# Patient Record
Sex: MALE | Employment: FULL TIME | URBAN - METROPOLITAN AREA
[De-identification: names, ages, dates, MRNs, and addresses within clinical notes are randomized per-mention and may not be internally consistent; named-entity substitution may affect disease eponyms.]

---

## 2018-10-01 ENCOUNTER — HOSPITAL ENCOUNTER (EMERGENCY)
Age: 48
Discharge: HOME | End: 2018-10-01
Attending: EMERGENCY MEDICINE | Admitting: EMERGENCY MEDICINE
Payer: COMMERCIAL

## 2018-10-01 VITALS
TEMPERATURE: 98.7 F | OXYGEN SATURATION: 95 % | HEART RATE: 114 BPM | DIASTOLIC BLOOD PRESSURE: 97 MMHG | HEIGHT: 70 IN | WEIGHT: 239.2 LBS | BODY MASS INDEX: 34.24 KG/M2 | SYSTOLIC BLOOD PRESSURE: 142 MMHG | RESPIRATION RATE: 18 BRPM

## 2018-10-01 DIAGNOSIS — S01.01XA LACERATION OF SCALP, INITIAL ENCOUNTER: Primary | ICD-10-CM

## 2018-10-01 PROCEDURE — 77030018836 HC SOL IRR NACL ICUM -A

## 2018-10-01 PROCEDURE — 77030008460 HC STPLR SKN PRECIS 3M -A

## 2018-10-01 PROCEDURE — 75810000293 HC SIMP/SUPERF WND  RPR

## 2018-10-01 PROCEDURE — 99282 EMERGENCY DEPT VISIT SF MDM: CPT

## 2018-10-01 NOTE — ED PROVIDER NOTES
EMERGENCY DEPARTMENT HISTORY AND PHYSICAL EXAM 
 
 
Date: 10/1/2018 Patient Name: Trveer Yi History of Presenting Illness Chief Complaint Patient presents with  Laceration  
  slipped on some soap in the shower and hit the back of head. states brief LOC. has laceration to the back of his head. acting appropriately History Provided By: Patient HPI: Trever Yi, 50 y.o. male with no significant PMHx, presents ambulatory to the ED with cc of laceration to posterior scalp secondary to mechanical fall this morning. Pt reports he slipped on soap in the shower, causing him to fall and hitting back of head. Pt mentions brief LOC following incident. He reports receiving tetanus shot within ~ 5 years and denies endorsing any medication to relieve current symptoms. He denies any hx of recurrent falls. Pt denies any exacerbating and alleviating factors. He specifically denies any fevers, chills, nausea, vomiting, chest pain, shortness of breath, headache, rash, diarrhea, sweating or weight loss. There are no other complaints, changes, or physical findings at this time. PCP: None Past History Past Medical History: 
History reviewed. No pertinent past medical history. Past Surgical History: 
Past Surgical History:  
Procedure Laterality Date  HX WISDOM TEETH EXTRACTION Family History: 
History reviewed. No pertinent family history. Social History: 
Social History Substance Use Topics  Smoking status: Never Smoker  Smokeless tobacco: Never Used  Alcohol use Yes Allergies: 
No Known Allergies Review of Systems Review of Systems Constitutional: Negative. Negative for activity change, appetite change, chills, fatigue, fever and unexpected weight change. HENT: Negative. Negative for congestion, hearing loss, rhinorrhea, sneezing and voice change. Eyes: Negative. Negative for pain and visual disturbance. Respiratory: Negative. Negative for apnea, cough, choking, chest tightness and shortness of breath. Cardiovascular: Negative. Negative for chest pain and palpitations. Gastrointestinal: Negative. Negative for abdominal distention, abdominal pain, blood in stool, diarrhea, nausea and vomiting. Genitourinary: Negative. Negative for difficulty urinating, flank pain, frequency and urgency. No discharge Musculoskeletal: Negative. Negative for arthralgias, back pain, myalgias and neck stiffness. Skin: Negative. Negative for color change and rash. +laceration Neurological: Negative for dizziness, seizures, speech difficulty, weakness, numbness and headaches. Hematological: Negative for adenopathy. Psychiatric/Behavioral: Negative. Negative for agitation, behavioral problems, dysphoric mood and suicidal ideas. The patient is not nervous/anxious. Physical Exam  
Physical Exam  
Constitutional: He is oriented to person, place, and time. He appears well-developed and well-nourished. No distress. HENT:  
Head: Normocephalic. Head is with laceration. Mouth/Throat: Oropharynx is clear and moist. No oropharyngeal exudate. 3 cm superficial flap to posterior occipital   
Eyes: Conjunctivae and EOM are normal. Pupils are equal, round, and reactive to light. Right eye exhibits no discharge. Left eye exhibits no discharge. Neck: Normal range of motion. Neck supple. Cardiovascular: Normal rate, regular rhythm and intact distal pulses. Exam reveals no gallop and no friction rub. No murmur heard. Pulmonary/Chest: Effort normal and breath sounds normal. No respiratory distress. He has no wheezes. He has no rales. He exhibits no tenderness. Abdominal: Soft. Bowel sounds are normal. He exhibits no distension and no mass. There is no tenderness. There is no rebound and no guarding. Musculoskeletal: Normal range of motion. He exhibits no edema. Lymphadenopathy: He has no cervical adenopathy. Neurological: He is alert and oriented to person, place, and time. No cranial nerve deficit. Coordination normal.  
Skin: Skin is warm and dry. Laceration noted. No rash noted. No erythema. Psychiatric: He has a normal mood and affect. Nursing note and vitals reviewed. Diagnostic Study Results Labs - No results found for this or any previous visit (from the past 12 hour(s)). Radiologic Studies - None Medical Decision Making I am the first provider for this patient. I reviewed the vital signs, available nursing notes, past medical history, past surgical history, family history and social history. Vital Signs-Reviewed the patient's vital signs. Patient Vitals for the past 12 hrs: 
 Temp Pulse Resp BP SpO2  
10/01/18 0928 98.7 °F (37.1 °C) (!) 114 18 (!) 142/97 95 % Pulse Oximetry Analysis - 95% on RA Records Reviewed: Nursing Notes and Old Medical Records Provider Notes (Medical Decision Making): DDx: laceration ED Course:  
Initial assessment performed. The patients presenting problems have been discussed, and they are in agreement with the care plan formulated and outlined with them. I have encouraged them to ask questions as they arise throughout their visit. Procedure Note - Laceration Repair: 
9:40 AM 
Procedure by Maria Antonia Cagle MD. Complexity: minor 3cm curved laceration to posterior occipital scalp  was irrigated copiously with NS under jet lavage, prepped with Betadine and draped in a sterile fashion. The wound was explored with the following results: No foreign bodies found. The wound was repaired with 4 staples. The wound was closed with good hemostasis and approximation. Sterile dressing applied. Estimated blood loss: 0 The procedure took 1-15 minutes, and pt tolerated well. Critical Care Time:  
0 minutes Disposition: 
Discharge Note: 
9:37 AM 
 The pt is ready for discharge. The pt's signs, symptoms, diagnosis, and discharge instructions have been discussed and pt has conveyed their understanding. The pt is to follow up as recommended or return to ER should their symptoms worsen. Plan has been discussed and pt is in agreement. PLAN: 
1. There are no discharge medications for this patient. 2.  
Follow-up Information Follow up With Details Comments Contact Info None   None ([phone] Patient stated that they have no PCP [facility] In 1 week For suture removal [address] [place] [address] [address] [facility] 
[phone] Return to ED if worse Diagnosis Clinical Impression: 1. Laceration of scalp, initial encounter Attestations: This note is prepared by Connie Lyons, acting as Scribe for [employer] Nehemiah Andino, [address] [place] [address] Nehemiah Andino MD: The scribe's documentation has been prepared under my direction and personally reviewed by me in its entirety. I confirm that the note above accurately reflects all work, treatment, procedures, and medical decision making performed by me

## 2018-10-01 NOTE — ED NOTES
Assumed care from triage. Patient was getting in the shower and slipped and fell on the shower and hit the back of his head. Patient did LOC for a second, but does not want an EKG. Patient was more so shocked. Dr. Dumont Rounds at bedside.

## 2018-10-01 NOTE — DISCHARGE INSTRUCTIONS
Cuts Closed With Staples: Care Instructions  Your Care Instructions  A cut can happen anywhere on your body. The doctor used staples to close the cut. Staples easily and quickly close a cut, which helps the cut heal.  Sometimes a cut can injure tendons, blood vessels, or nerves. If the cut went deep and through the skin, the doctor may have put in a layer of stitches below the staples. The deeper layer of stitches brings the deep part of the cut together. These stitches will dissolve and don't need to be removed. The staples in the upper layer are what you see on the cut. You may have a bandage. You will need to have the staples removed, usually in 7 to 14 days. The doctor has checked you carefully, but problems can develop later. If you notice any problems or new symptoms, get medical treatment right away. Follow-up care is a key part of your treatment and safety. Be sure to make and go to all appointments, and call your doctor if you are having problems. It's also a good idea to know your test results and keep a list of the medicines you take. How can you care for yourself at home? · Keep the cut dry for the first 24 to 48 hours. After this, you can shower if your doctor okays it. Pat the cut dry. · Don't soak the cut, such as in a bathtub. Your doctor will tell you when it's safe to get the cut wet. · If your doctor told you how to care for your cut, follow your doctor's instructions. If you did not get instructions, follow this general advice:  ¨ After the first 24 to 48 hours, wash around the cut with clean water 2 times a day. Don't use hydrogen peroxide or alcohol, which can slow healing. ¨ You may cover the cut with a thin layer of petroleum jelly, such as Vaseline, and a nonstick bandage. ¨ Apply more petroleum jelly and replace the bandage as needed. · Avoid any activity that could cause your cut to reopen. · Do not remove the staples on your own.  Your doctor will tell you when to come back to have the staples removed. · Take pain medicines exactly as directed. ¨ If the doctor gave you a prescription medicine for pain, take it as prescribed. ¨ If you are not taking a prescription pain medicine, ask your doctor if you can take an over-the-counter medicine. When should you call for help? Call your doctor now or seek immediate medical care if:    · You have new pain, or your pain gets worse.     · The skin near the cut is cold or pale or changes color.     · You have tingling, weakness, or numbness near the cut.     · The cut starts to bleed, and blood soaks through the bandage. Oozing small amounts of blood is normal.     · You have trouble moving the area near the cut.     · You have symptoms of infection, such as:  ¨ Increased pain, swelling, warmth, or redness around the cut. ¨ Red streaks leading from the cut. ¨ Pus draining from the cut. ¨ A fever.    Watch closely for changes in your health, and be sure to contact your doctor if:    · You do not get better as expected. Where can you learn more? Go to http://sayda-justen.info/. Enter Z092 in the search box to learn more about \"Cuts Closed With Staples: Care Instructions. \"  Current as of: November 20, 2017  Content Version: 11.7  © 3781-0575 [employer]. Care instructions adapted under license by [employer] (which disclaims liability or warranty for this information). If you have questions about a medical condition or this instruction, always ask your healthcare professional. [place] [address] any warranty or liability for your use of this information.

## 2018-10-06 ENCOUNTER — APPOINTMENT (OUTPATIENT)
Dept: CT IMAGING | Age: 48
DRG: 637 | End: 2018-10-06
Attending: EMERGENCY MEDICINE
Payer: COMMERCIAL

## 2018-10-06 ENCOUNTER — HOSPITAL ENCOUNTER (INPATIENT)
Age: 48
LOS: 6 days | Discharge: HOME | DRG: 637 | End: 2018-10-12
Attending: EMERGENCY MEDICINE | Admitting: INTERNAL MEDICINE
Payer: COMMERCIAL

## 2018-10-06 ENCOUNTER — APPOINTMENT (OUTPATIENT)
Dept: GENERAL RADIOLOGY | Age: 48
DRG: 637 | End: 2018-10-06
Attending: EMERGENCY MEDICINE
Payer: COMMERCIAL

## 2018-10-06 DIAGNOSIS — N17.9 AKI (ACUTE KIDNEY INJURY) (HCC): ICD-10-CM

## 2018-10-06 DIAGNOSIS — T79.6XXA TRAUMATIC RHABDOMYOLYSIS, INITIAL ENCOUNTER (HCC): ICD-10-CM

## 2018-10-06 DIAGNOSIS — G93.41 ACUTE METABOLIC ENCEPHALOPATHY: ICD-10-CM

## 2018-10-06 DIAGNOSIS — E87.20 METABOLIC ACIDOSIS: ICD-10-CM

## 2018-10-06 DIAGNOSIS — E87.6 ACUTE HYPOKALEMIA: ICD-10-CM

## 2018-10-06 DIAGNOSIS — E08.11 DIABETIC KETOACIDOSIS WITH COMA ASSOCIATED WITH DIABETES MELLITUS DUE TO UNDERLYING CONDITION (HCC): Primary | ICD-10-CM

## 2018-10-06 DIAGNOSIS — E87.0 ACUTE HYPERNATREMIA: ICD-10-CM

## 2018-10-06 DIAGNOSIS — R65.10 SIRS (SYSTEMIC INFLAMMATORY RESPONSE SYNDROME) (HCC): ICD-10-CM

## 2018-10-06 DIAGNOSIS — E87.20 LACTIC ACIDOSIS: ICD-10-CM

## 2018-10-06 PROBLEM — E11.10 DKA (DIABETIC KETOACIDOSES): Status: ACTIVE | Noted: 2018-10-06

## 2018-10-06 LAB
ADMINISTERED INITIALS, ADMINIT: NORMAL
ALBUMIN SERPL-MCNC: 3.4 G/DL (ref 3.5–5)
ALBUMIN/GLOB SERPL: 0.6 {RATIO} (ref 1.1–2.2)
ALP SERPL-CCNC: 178 U/L (ref 45–117)
ALT SERPL-CCNC: 26 U/L (ref 12–78)
AMMONIA PLAS-SCNC: 19 UMOL/L
AMPHET UR QL SCN: NEGATIVE
ANION GAP SERPL CALC-SCNC: 10 MMOL/L (ref 5–15)
ANION GAP SERPL CALC-SCNC: 14 MMOL/L (ref 5–15)
ANION GAP SERPL CALC-SCNC: 22 MMOL/L (ref 5–15)
ANION GAP SERPL CALC-SCNC: 26 MMOL/L (ref 5–15)
ANION GAP SERPL CALC-SCNC: 7 MMOL/L (ref 5–15)
ANION GAP SERPL CALC-SCNC: 8 MMOL/L (ref 5–15)
APPEARANCE UR: CLEAR
ARTERIAL PATENCY WRIST A: YES
AST SERPL-CCNC: 10 U/L (ref 15–37)
ATRIAL RATE: 108 BPM
BACTERIA URNS QL MICRO: NEGATIVE /HPF
BARBITURATES UR QL SCN: NEGATIVE
BASE DEFICIT BLDA-SCNC: 13.2 MMOL/L
BASE DEFICIT BLDV-SCNC: 12.8 MMOL/L
BASOPHILS # BLD: 0 K/UL (ref 0–0.1)
BASOPHILS # BLD: 0 K/UL (ref 0–0.1)
BASOPHILS NFR BLD: 0 % (ref 0–1)
BASOPHILS NFR BLD: 0 % (ref 0–1)
BDY SITE: ABNORMAL
BDY SITE: ABNORMAL
BENZODIAZ UR QL: NEGATIVE
BILIRUB SERPL-MCNC: 0.6 MG/DL (ref 0.2–1)
BILIRUB UR QL: NEGATIVE
BREATHS.SPONTANEOUS ON VENT: 28
BREATHS.SPONTANEOUS ON VENT: 28
BUN SERPL-MCNC: 73 MG/DL (ref 6–20)
BUN SERPL-MCNC: 73 MG/DL (ref 6–20)
BUN SERPL-MCNC: 76 MG/DL (ref 6–20)
BUN SERPL-MCNC: 82 MG/DL (ref 6–20)
BUN/CREAT SERPL: 13 (ref 12–20)
BUN/CREAT SERPL: 13 (ref 12–20)
BUN/CREAT SERPL: 14 (ref 12–20)
BUN/CREAT SERPL: 15 (ref 12–20)
CALCIUM SERPL-MCNC: 6.5 MG/DL (ref 8.5–10.1)
CALCIUM SERPL-MCNC: 8.3 MG/DL (ref 8.5–10.1)
CALCIUM SERPL-MCNC: 8.6 MG/DL (ref 8.5–10.1)
CALCIUM SERPL-MCNC: 8.7 MG/DL (ref 8.5–10.1)
CALCIUM SERPL-MCNC: 8.9 MG/DL (ref 8.5–10.1)
CALCIUM SERPL-MCNC: 9.1 MG/DL (ref 8.5–10.1)
CALCULATED P AXIS, ECG09: 62 DEGREES
CALCULATED R AXIS, ECG10: 20 DEGREES
CALCULATED T AXIS, ECG11: -49 DEGREES
CANNABINOIDS UR QL SCN: NEGATIVE
CHLORIDE SERPL-SCNC: 112 MMOL/L (ref 97–108)
CHLORIDE SERPL-SCNC: 122 MMOL/L (ref 97–108)
CHLORIDE SERPL-SCNC: 127 MMOL/L (ref 97–108)
CHLORIDE SERPL-SCNC: 136 MMOL/L (ref 97–108)
CHLORIDE SERPL-SCNC: 137 MMOL/L (ref 97–108)
CHLORIDE SERPL-SCNC: 97 MMOL/L (ref 97–108)
CK MB CFR SERPL CALC: 0.5 % (ref 0–2.5)
CK MB SERPL-MCNC: 2.1 NG/ML (ref 5–25)
CK SERPL-CCNC: 414 U/L (ref 39–308)
CO2 SERPL-SCNC: 12 MMOL/L (ref 21–32)
CO2 SERPL-SCNC: 14 MMOL/L (ref 21–32)
CO2 SERPL-SCNC: 21 MMOL/L (ref 21–32)
CO2 SERPL-SCNC: 23 MMOL/L (ref 21–32)
CO2 SERPL-SCNC: 24 MMOL/L (ref 21–32)
CO2 SERPL-SCNC: 26 MMOL/L (ref 21–32)
COCAINE UR QL SCN: NEGATIVE
COLOR UR: ABNORMAL
CREAT SERPL-MCNC: 5.21 MG/DL (ref 0.7–1.3)
CREAT SERPL-MCNC: 5.35 MG/DL (ref 0.7–1.3)
CREAT SERPL-MCNC: 5.37 MG/DL (ref 0.7–1.3)
CREAT SERPL-MCNC: 5.44 MG/DL (ref 0.7–1.3)
CREAT SERPL-MCNC: 5.94 MG/DL (ref 0.7–1.3)
CREAT SERPL-MCNC: 6.31 MG/DL (ref 0.7–1.3)
CREAT UR-MCNC: 64.2 MG/DL
CREAT UR-MCNC: 65.3 MG/DL
D50 ADMINISTERED, D50ADM: 0 ML
D50 ORDER, D50ORD: 0 ML
DIAGNOSIS, 93000: NORMAL
DIFFERENTIAL METHOD BLD: ABNORMAL
DIFFERENTIAL METHOD BLD: ABNORMAL
DRUG SCRN COMMENT,DRGCM: NORMAL
EOSINOPHIL # BLD: 0 K/UL (ref 0–0.4)
EOSINOPHIL # BLD: 0 K/UL (ref 0–0.4)
EOSINOPHIL NFR BLD: 0 % (ref 0–7)
EOSINOPHIL NFR BLD: 0 % (ref 0–7)
EPITH CASTS URNS QL MICRO: ABNORMAL /LPF
ERYTHROCYTE [DISTWIDTH] IN BLOOD BY AUTOMATED COUNT: 13.7 % (ref 11.5–14.5)
ERYTHROCYTE [DISTWIDTH] IN BLOOD BY AUTOMATED COUNT: 15.1 % (ref 11.5–14.5)
EST. AVERAGE GLUCOSE BLD GHB EST-MCNC: 329 MG/DL
EST. AVERAGE GLUCOSE BLD GHB EST-MCNC: 335 MG/DL
FLUAV AG NPH QL IA: NEGATIVE
FLUBV AG NOSE QL IA: NEGATIVE
GLOBULIN SER CALC-MCNC: 5.3 G/DL (ref 2–4)
GLSCOM COMMENTS: NORMAL
GLUCOSE BLD STRIP.AUTO-MCNC: 122 MG/DL (ref 65–100)
GLUCOSE BLD STRIP.AUTO-MCNC: 130 MG/DL (ref 65–100)
GLUCOSE BLD STRIP.AUTO-MCNC: 162 MG/DL (ref 65–100)
GLUCOSE BLD STRIP.AUTO-MCNC: 168 MG/DL (ref 65–100)
GLUCOSE BLD STRIP.AUTO-MCNC: 190 MG/DL (ref 65–100)
GLUCOSE BLD STRIP.AUTO-MCNC: 237 MG/DL (ref 65–100)
GLUCOSE BLD STRIP.AUTO-MCNC: 345 MG/DL (ref 65–100)
GLUCOSE BLD STRIP.AUTO-MCNC: 373 MG/DL (ref 65–100)
GLUCOSE BLD STRIP.AUTO-MCNC: 452 MG/DL (ref 65–100)
GLUCOSE BLD STRIP.AUTO-MCNC: 517 MG/DL (ref 65–100)
GLUCOSE BLD STRIP.AUTO-MCNC: >600 MG/DL (ref 65–100)
GLUCOSE SERPL-MCNC: 1133 MG/DL (ref 65–100)
GLUCOSE SERPL-MCNC: 1304 MG/DL (ref 65–100)
GLUCOSE SERPL-MCNC: 1325 MG/DL (ref 65–100)
GLUCOSE SERPL-MCNC: 177 MG/DL (ref 65–100)
GLUCOSE SERPL-MCNC: 398 MG/DL (ref 65–100)
GLUCOSE SERPL-MCNC: 799 MG/DL (ref 65–100)
GLUCOSE SERPL-MCNC: 968 MG/DL (ref 65–100)
GLUCOSE SERPL-MCNC: >1500 MG/DL (ref 65–100)
GLUCOSE SERPL-MCNC: >1500 MG/DL (ref 65–100)
GLUCOSE UR STRIP.AUTO-MCNC: >1000 MG/DL
GLUCOSE, GLC: 122 MG/DL
GLUCOSE, GLC: 130 MG/DL
GLUCOSE, GLC: 162 MG/DL
GLUCOSE, GLC: 168 MG/DL
GLUCOSE, GLC: 190 MG/DL
GLUCOSE, GLC: 237 MG/DL
GLUCOSE, GLC: 345 MG/DL
GLUCOSE, GLC: 373 MG/DL
GLUCOSE, GLC: 452 MG/DL
GLUCOSE, GLC: 517 MG/DL
GLUCOSE, GLC: 601 MG/DL
HBA1C MFR BLD: 13.1 % (ref 4.2–6.3)
HBA1C MFR BLD: 13.3 % (ref 4.2–6.3)
HCO3 BLDA-SCNC: 13 MMOL/L (ref 22–26)
HCO3 BLDV-SCNC: 16 MMOL/L (ref 23–28)
HCT VFR BLD AUTO: 43.7 % (ref 36.6–50.3)
HCT VFR BLD AUTO: 53.3 % (ref 36.6–50.3)
HGB BLD-MCNC: 14.4 G/DL (ref 12.1–17)
HGB BLD-MCNC: 15.6 G/DL (ref 12.1–17)
HGB UR QL STRIP: ABNORMAL
HIGH TARGET, HITG: 250 MG/DL
IMM GRANULOCYTES # BLD: 0 K/UL (ref 0–0.04)
IMM GRANULOCYTES # BLD: 0.3 K/UL (ref 0–0.04)
IMM GRANULOCYTES NFR BLD AUTO: 0 % (ref 0–0.5)
IMM GRANULOCYTES NFR BLD AUTO: 2 % (ref 0–0.5)
INSULIN ADMINSTERED, INSADM: 10.2 UNITS/HOUR
INSULIN ADMINSTERED, INSADM: 10.8 UNITS/HOUR
INSULIN ADMINSTERED, INSADM: 10.8 UNITS/HOUR
INSULIN ADMINSTERED, INSADM: 13 UNITS/HOUR
INSULIN ADMINSTERED, INSADM: 16.2 UNITS/HOUR
INSULIN ADMINSTERED, INSADM: 16.2 UNITS/HOUR
INSULIN ADMINSTERED, INSADM: 17.7 UNITS/HOUR
INSULIN ADMINSTERED, INSADM: 21.6 UNITS/HOUR
INSULIN ADMINSTERED, INSADM: 21.6 UNITS/HOUR
INSULIN ADMINSTERED, INSADM: 27.1 UNITS/HOUR
INSULIN ADMINSTERED, INSADM: 27.1 UNITS/HOUR
INSULIN ADMINSTERED, INSADM: 28.2 UNITS/HOUR
INSULIN ADMINSTERED, INSADM: 28.5 UNITS/HOUR
INSULIN ADMINSTERED, INSADM: 31.4 UNITS/HOUR
INSULIN ADMINSTERED, INSADM: 32 UNITS/HOUR
INSULIN ADMINSTERED, INSADM: 32.5 UNITS/HOUR
INSULIN ADMINSTERED, INSADM: 4 UNITS/HOUR
INSULIN ADMINSTERED, INSADM: 5.6 UNITS/HOUR
INSULIN ORDER, INSORD: 10.2 UNITS/HOUR
INSULIN ORDER, INSORD: 10.8 UNITS/HOUR
INSULIN ORDER, INSORD: 10.8 UNITS/HOUR
INSULIN ORDER, INSORD: 13 UNITS/HOUR
INSULIN ORDER, INSORD: 16.2 UNITS/HOUR
INSULIN ORDER, INSORD: 16.2 UNITS/HOUR
INSULIN ORDER, INSORD: 17.7 UNITS/HOUR
INSULIN ORDER, INSORD: 21.6 UNITS/HOUR
INSULIN ORDER, INSORD: 21.6 UNITS/HOUR
INSULIN ORDER, INSORD: 27.1 UNITS/HOUR
INSULIN ORDER, INSORD: 27.1 UNITS/HOUR
INSULIN ORDER, INSORD: 28.2 UNITS/HOUR
INSULIN ORDER, INSORD: 28.5 UNITS/HOUR
INSULIN ORDER, INSORD: 31.4 UNITS/HOUR
INSULIN ORDER, INSORD: 32 UNITS/HOUR
INSULIN ORDER, INSORD: 32.5 UNITS/HOUR
INSULIN ORDER, INSORD: 4 UNITS/HOUR
INSULIN ORDER, INSORD: 5.6 UNITS/HOUR
KETONES UR QL STRIP.AUTO: 15 MG/DL
LACTATE SERPL-SCNC: 2.2 MMOL/L (ref 0.4–2)
LACTATE SERPL-SCNC: 3.5 MMOL/L (ref 0.4–2)
LACTATE SERPL-SCNC: 3.8 MMOL/L (ref 0.4–2)
LEUKOCYTE ESTERASE UR QL STRIP.AUTO: NEGATIVE
LIPASE SERPL-CCNC: 889 U/L (ref 73–393)
LOW TARGET, LOT: 150 MG/DL
LYMPHOCYTES # BLD: 1.2 K/UL (ref 0.8–3.5)
LYMPHOCYTES # BLD: 1.3 K/UL (ref 0.8–3.5)
LYMPHOCYTES NFR BLD: 6 % (ref 12–49)
LYMPHOCYTES NFR BLD: 7 % (ref 12–49)
MAGNESIUM SERPL-MCNC: 3.3 MG/DL (ref 1.6–2.4)
MAGNESIUM SERPL-MCNC: 4 MG/DL (ref 1.6–2.4)
MAGNESIUM SERPL-MCNC: 4.1 MG/DL (ref 1.6–2.4)
MAGNESIUM SERPL-MCNC: 4.2 MG/DL (ref 1.6–2.4)
MAGNESIUM SERPL-MCNC: 4.3 MG/DL (ref 1.6–2.4)
MCH RBC QN AUTO: 27.6 PG (ref 26–34)
MCH RBC QN AUTO: 28 PG (ref 26–34)
MCHC RBC AUTO-ENTMCNC: 29.3 G/DL (ref 30–36.5)
MCHC RBC AUTO-ENTMCNC: 33 G/DL (ref 30–36.5)
MCV RBC AUTO: 83.9 FL (ref 80–99)
MCV RBC AUTO: 95.5 FL (ref 80–99)
METHADONE UR QL: NEGATIVE
MICROALBUMIN UR-MCNC: 7.74 MG/DL
MICROALBUMIN/CREAT UR-RTO: 119 MG/G (ref 0–30)
MINUTES UNTIL NEXT BG, NBG: 60 MIN
MONOCYTES # BLD: 0.5 K/UL (ref 0–1)
MONOCYTES # BLD: 1.2 K/UL (ref 0–1)
MONOCYTES NFR BLD: 3 % (ref 5–13)
MONOCYTES NFR BLD: 6 % (ref 5–13)
MULTIPLIER, MUL: 0.02
MULTIPLIER, MUL: 0.03
MULTIPLIER, MUL: 0.03
MULTIPLIER, MUL: 0.04
MULTIPLIER, MUL: 0.04
MULTIPLIER, MUL: 0.05
MULTIPLIER, MUL: 0.05
MULTIPLIER, MUL: 0.06
MULTIPLIER, MUL: 0.06
MULTIPLIER, MUL: 0.07
MULTIPLIER, MUL: 0.08
MULTIPLIER, MUL: 0.08
MULTIPLIER, MUL: 0.09
MULTIPLIER, MUL: 0.1
NEUTS BAND NFR BLD MANUAL: 3 %
NEUTS SEG # BLD: 16.7 K/UL (ref 1.8–8)
NEUTS SEG # BLD: 18 K/UL (ref 1.8–8)
NEUTS SEG NFR BLD: 85 % (ref 32–75)
NEUTS SEG NFR BLD: 89 % (ref 32–75)
NITRITE UR QL STRIP.AUTO: NEGATIVE
NRBC # BLD: 0 K/UL (ref 0–0.01)
NRBC # BLD: 0 K/UL (ref 0–0.01)
NRBC BLD-RTO: 0 PER 100 WBC
NRBC BLD-RTO: 0 PER 100 WBC
OPIATES UR QL: NEGATIVE
ORDER INITIALS, ORDINIT: NORMAL
P-R INTERVAL, ECG05: 132 MS
PCO2 BLDA: 30 MMHG (ref 35–45)
PCO2 BLDV: 48 MMHG (ref 41–51)
PCP UR QL: NEGATIVE
PH BLDA: 7.25 [PH] (ref 7.35–7.45)
PH BLDV: 7.14 [PH] (ref 7.32–7.42)
PH UR STRIP: 5 [PH] (ref 5–8)
PHOSPHATE SERPL-MCNC: 1.2 MG/DL (ref 2.6–4.7)
PHOSPHATE SERPL-MCNC: 4.6 MG/DL (ref 2.6–4.7)
PLATELET # BLD AUTO: 221 K/UL (ref 150–400)
PLATELET # BLD AUTO: 264 K/UL (ref 150–400)
PMV BLD AUTO: 12 FL (ref 8.9–12.9)
PMV BLD AUTO: 12.8 FL (ref 8.9–12.9)
PO2 BLDA: 83 MMHG (ref 80–100)
PO2 BLDV: 33 MMHG (ref 25–40)
POTASSIUM SERPL-SCNC: 3 MMOL/L (ref 3.5–5.1)
POTASSIUM SERPL-SCNC: 3.1 MMOL/L (ref 3.5–5.1)
POTASSIUM SERPL-SCNC: 3.6 MMOL/L (ref 3.5–5.1)
POTASSIUM SERPL-SCNC: 3.6 MMOL/L (ref 3.5–5.1)
POTASSIUM SERPL-SCNC: 3.8 MMOL/L (ref 3.5–5.1)
POTASSIUM SERPL-SCNC: 4.6 MMOL/L (ref 3.5–5.1)
PROT SERPL-MCNC: 8.7 G/DL (ref 6.4–8.2)
PROT UR STRIP-MCNC: 30 MG/DL
Q-T INTERVAL, ECG07: 348 MS
QRS DURATION, ECG06: 90 MS
QTC CALCULATION (BEZET), ECG08: 466 MS
RBC # BLD AUTO: 5.21 M/UL (ref 4.1–5.7)
RBC # BLD AUTO: 5.58 M/UL (ref 4.1–5.7)
RBC #/AREA URNS HPF: ABNORMAL /HPF (ref 0–5)
RBC MORPH BLD: ABNORMAL
SAO2 % BLD: 95 % (ref 92–97)
SAO2 % BLDV: 46 % (ref 65–88)
SAO2% DEVICE SAO2% SENSOR NAME: ABNORMAL
SAO2% DEVICE SAO2% SENSOR NAME: ABNORMAL
SERVICE CMNT-IMP: ABNORMAL
SODIUM SERPL-SCNC: 137 MMOL/L (ref 136–145)
SODIUM SERPL-SCNC: 146 MMOL/L (ref 136–145)
SODIUM SERPL-SCNC: 157 MMOL/L (ref 136–145)
SODIUM SERPL-SCNC: 161 MMOL/L (ref 136–145)
SODIUM SERPL-SCNC: 167 MMOL/L (ref 136–145)
SODIUM SERPL-SCNC: 170 MMOL/L (ref 136–145)
SODIUM UR-SCNC: 13 MMOL/L
SP GR UR REFRACTOMETRY: 1.03 (ref 1–1.03)
SPECIMEN SITE: ABNORMAL
SPECIMEN SITE: ABNORMAL
TRIGL SERPL-MCNC: 298 MG/DL (ref ?–150)
TROPONIN I SERPL-MCNC: 0.06 NG/ML
UA: UC IF INDICATED,UAUC: ABNORMAL
UROBILINOGEN UR QL STRIP.AUTO: 0.2 EU/DL (ref 0.2–1)
VENTRICULAR RATE, ECG03: 108 BPM
WBC # BLD AUTO: 18.9 K/UL (ref 4.1–11.1)
WBC # BLD AUTO: 20.4 K/UL (ref 4.1–11.1)
WBC URNS QL MICRO: ABNORMAL /HPF (ref 0–4)

## 2018-10-06 PROCEDURE — 83605 ASSAY OF LACTIC ACID: CPT | Performed by: GENERAL ACUTE CARE HOSPITAL

## 2018-10-06 PROCEDURE — 74011000250 HC RX REV CODE- 250: Performed by: INTERNAL MEDICINE

## 2018-10-06 PROCEDURE — 74011250636 HC RX REV CODE- 250/636: Performed by: INTERNAL MEDICINE

## 2018-10-06 PROCEDURE — 80307 DRUG TEST PRSMV CHEM ANLYZR: CPT | Performed by: EMERGENCY MEDICINE

## 2018-10-06 PROCEDURE — 96376 TX/PRO/DX INJ SAME DRUG ADON: CPT

## 2018-10-06 PROCEDURE — 82962 GLUCOSE BLOOD TEST: CPT

## 2018-10-06 PROCEDURE — 82570 ASSAY OF URINE CREATININE: CPT | Performed by: INTERNAL MEDICINE

## 2018-10-06 PROCEDURE — 96375 TX/PRO/DX INJ NEW DRUG ADDON: CPT

## 2018-10-06 PROCEDURE — 93005 ELECTROCARDIOGRAM TRACING: CPT

## 2018-10-06 PROCEDURE — 65610000006 HC RM INTENSIVE CARE

## 2018-10-06 PROCEDURE — 81001 URINALYSIS AUTO W/SCOPE: CPT | Performed by: EMERGENCY MEDICINE

## 2018-10-06 PROCEDURE — 74011636637 HC RX REV CODE- 636/637: Performed by: INTERNAL MEDICINE

## 2018-10-06 PROCEDURE — 96361 HYDRATE IV INFUSION ADD-ON: CPT

## 2018-10-06 PROCEDURE — 83036 HEMOGLOBIN GLYCOSYLATED A1C: CPT | Performed by: EMERGENCY MEDICINE

## 2018-10-06 PROCEDURE — 74011636637 HC RX REV CODE- 636/637: Performed by: EMERGENCY MEDICINE

## 2018-10-06 PROCEDURE — 96365 THER/PROPH/DIAG IV INF INIT: CPT

## 2018-10-06 PROCEDURE — C1751 CATH, INF, PER/CENT/MIDLINE: HCPCS

## 2018-10-06 PROCEDURE — 36600 WITHDRAWAL OF ARTERIAL BLOOD: CPT | Performed by: INTERNAL MEDICINE

## 2018-10-06 PROCEDURE — 83690 ASSAY OF LIPASE: CPT | Performed by: INTERNAL MEDICINE

## 2018-10-06 PROCEDURE — 74011000250 HC RX REV CODE- 250: Performed by: EMERGENCY MEDICINE

## 2018-10-06 PROCEDURE — 87804 INFLUENZA ASSAY W/OPTIC: CPT | Performed by: EMERGENCY MEDICINE

## 2018-10-06 PROCEDURE — 83036 HEMOGLOBIN GLYCOSYLATED A1C: CPT | Performed by: INTERNAL MEDICINE

## 2018-10-06 PROCEDURE — 36415 COLL VENOUS BLD VENIPUNCTURE: CPT | Performed by: INTERNAL MEDICINE

## 2018-10-06 PROCEDURE — 75810000137 HC PLCMT CENT VENOUS CATH

## 2018-10-06 PROCEDURE — 74011250637 HC RX REV CODE- 250/637: Performed by: GENERAL ACUTE CARE HOSPITAL

## 2018-10-06 PROCEDURE — 84100 ASSAY OF PHOSPHORUS: CPT | Performed by: EMERGENCY MEDICINE

## 2018-10-06 PROCEDURE — 84100 ASSAY OF PHOSPHORUS: CPT | Performed by: INTERNAL MEDICINE

## 2018-10-06 PROCEDURE — 71045 X-RAY EXAM CHEST 1 VIEW: CPT

## 2018-10-06 PROCEDURE — 80048 BASIC METABOLIC PNL TOTAL CA: CPT | Performed by: INTERNAL MEDICINE

## 2018-10-06 PROCEDURE — 70450 CT HEAD/BRAIN W/O DYE: CPT

## 2018-10-06 PROCEDURE — 74011250636 HC RX REV CODE- 250/636: Performed by: EMERGENCY MEDICINE

## 2018-10-06 PROCEDURE — 74011000258 HC RX REV CODE- 258: Performed by: INTERNAL MEDICINE

## 2018-10-06 PROCEDURE — 85025 COMPLETE CBC W/AUTO DIFF WBC: CPT | Performed by: EMERGENCY MEDICINE

## 2018-10-06 PROCEDURE — 80053 COMPREHEN METABOLIC PANEL: CPT | Performed by: EMERGENCY MEDICINE

## 2018-10-06 PROCEDURE — 74011250636 HC RX REV CODE- 250/636: Performed by: GENERAL ACUTE CARE HOSPITAL

## 2018-10-06 PROCEDURE — 82550 ASSAY OF CK (CPK): CPT | Performed by: EMERGENCY MEDICINE

## 2018-10-06 PROCEDURE — 82803 BLOOD GASES ANY COMBINATION: CPT | Performed by: EMERGENCY MEDICINE

## 2018-10-06 PROCEDURE — 84478 ASSAY OF TRIGLYCERIDES: CPT | Performed by: INTERNAL MEDICINE

## 2018-10-06 PROCEDURE — 74011000258 HC RX REV CODE- 258: Performed by: EMERGENCY MEDICINE

## 2018-10-06 PROCEDURE — 06HY33Z INSERTION OF INFUSION DEVICE INTO LOWER VEIN, PERCUTANEOUS APPROACH: ICD-10-PCS | Performed by: EMERGENCY MEDICINE

## 2018-10-06 PROCEDURE — 83735 ASSAY OF MAGNESIUM: CPT | Performed by: INTERNAL MEDICINE

## 2018-10-06 PROCEDURE — 99285 EMERGENCY DEPT VISIT HI MDM: CPT

## 2018-10-06 PROCEDURE — 82140 ASSAY OF AMMONIA: CPT | Performed by: EMERGENCY MEDICINE

## 2018-10-06 PROCEDURE — 87040 BLOOD CULTURE FOR BACTERIA: CPT | Performed by: EMERGENCY MEDICINE

## 2018-10-06 PROCEDURE — 77030005518 HC CATH URETH FOL 2W BARD -B

## 2018-10-06 PROCEDURE — 84484 ASSAY OF TROPONIN QUANT: CPT | Performed by: EMERGENCY MEDICINE

## 2018-10-06 PROCEDURE — 82803 BLOOD GASES ANY COMBINATION: CPT | Performed by: INTERNAL MEDICINE

## 2018-10-06 PROCEDURE — 83605 ASSAY OF LACTIC ACID: CPT | Performed by: EMERGENCY MEDICINE

## 2018-10-06 PROCEDURE — 84300 ASSAY OF URINE SODIUM: CPT | Performed by: INTERNAL MEDICINE

## 2018-10-06 PROCEDURE — 82947 ASSAY GLUCOSE BLOOD QUANT: CPT

## 2018-10-06 PROCEDURE — 82947 ASSAY GLUCOSE BLOOD QUANT: CPT | Performed by: INTERNAL MEDICINE

## 2018-10-06 PROCEDURE — 83735 ASSAY OF MAGNESIUM: CPT | Performed by: EMERGENCY MEDICINE

## 2018-10-06 RX ORDER — DEXTROSE 50 % IN WATER (D50W) INTRAVENOUS SYRINGE
25-50 AS NEEDED
Status: DISCONTINUED | OUTPATIENT
Start: 2018-10-06 | End: 2018-10-09

## 2018-10-06 RX ORDER — VANCOMYCIN 2 GRAM/500 ML IN 0.9 % SODIUM CHLORIDE INTRAVENOUS
2
Status: DISCONTINUED | OUTPATIENT
Start: 2018-10-06 | End: 2018-10-07

## 2018-10-06 RX ORDER — SODIUM CHLORIDE 0.9 % (FLUSH) 0.9 %
5-10 SYRINGE (ML) INJECTION AS NEEDED
Status: DISCONTINUED | OUTPATIENT
Start: 2018-10-06 | End: 2018-10-09 | Stop reason: SDUPTHER

## 2018-10-06 RX ORDER — MAGNESIUM SULFATE 100 %
4 CRYSTALS MISCELLANEOUS AS NEEDED
Status: DISCONTINUED | OUTPATIENT
Start: 2018-10-06 | End: 2018-10-06

## 2018-10-06 RX ORDER — MAGNESIUM SULFATE 100 %
4 CRYSTALS MISCELLANEOUS AS NEEDED
Status: DISCONTINUED | OUTPATIENT
Start: 2018-10-06 | End: 2018-10-09

## 2018-10-06 RX ORDER — MUPIROCIN 20 MG/G
OINTMENT TOPICAL 2 TIMES DAILY
Status: COMPLETED | OUTPATIENT
Start: 2018-10-06 | End: 2018-10-10

## 2018-10-06 RX ORDER — DEXTROSE 50 % IN WATER (D50W) INTRAVENOUS SYRINGE
25-50 AS NEEDED
Status: DISCONTINUED | OUTPATIENT
Start: 2018-10-06 | End: 2018-10-06

## 2018-10-06 RX ORDER — POTASSIUM CHLORIDE AND SODIUM CHLORIDE 900; 300 MG/100ML; MG/100ML
INJECTION, SOLUTION INTRAVENOUS CONTINUOUS
Status: DISCONTINUED | OUTPATIENT
Start: 2018-10-06 | End: 2018-10-06

## 2018-10-06 RX ORDER — SODIUM CHLORIDE 0.9 % (FLUSH) 0.9 %
5-10 SYRINGE (ML) INJECTION EVERY 8 HOURS
Status: DISCONTINUED | OUTPATIENT
Start: 2018-10-06 | End: 2018-10-12 | Stop reason: HOSPADM

## 2018-10-06 RX ORDER — HEPARIN SODIUM 5000 [USP'U]/ML
5000 INJECTION, SOLUTION INTRAVENOUS; SUBCUTANEOUS EVERY 12 HOURS
Status: DISCONTINUED | OUTPATIENT
Start: 2018-10-06 | End: 2018-10-06

## 2018-10-06 RX ORDER — FAMOTIDINE 10 MG/ML
20 INJECTION INTRAVENOUS EVERY 12 HOURS
Status: DISCONTINUED | OUTPATIENT
Start: 2018-10-06 | End: 2018-10-06

## 2018-10-06 RX ORDER — INSULIN LISPRO 100 [IU]/ML
INJECTION, SOLUTION INTRAVENOUS; SUBCUTANEOUS
Status: DISCONTINUED | OUTPATIENT
Start: 2018-10-06 | End: 2018-10-06

## 2018-10-06 RX ORDER — POTASSIUM CHLORIDE AND SODIUM CHLORIDE 450; 150 MG/100ML; MG/100ML
INJECTION, SOLUTION INTRAVENOUS CONTINUOUS
Status: DISCONTINUED | OUTPATIENT
Start: 2018-10-06 | End: 2018-10-07

## 2018-10-06 RX ORDER — SODIUM CHLORIDE 0.9 % (FLUSH) 0.9 %
5-10 SYRINGE (ML) INJECTION AS NEEDED
Status: DISCONTINUED | OUTPATIENT
Start: 2018-10-06 | End: 2018-10-12 | Stop reason: HOSPADM

## 2018-10-06 RX ORDER — HEPARIN SODIUM 5000 [USP'U]/ML
5000 INJECTION, SOLUTION INTRAVENOUS; SUBCUTANEOUS EVERY 8 HOURS
Status: DISCONTINUED | OUTPATIENT
Start: 2018-10-06 | End: 2018-10-09

## 2018-10-06 RX ORDER — POTASSIUM CHLORIDE 7.45 MG/ML
10 INJECTION INTRAVENOUS
Status: COMPLETED | OUTPATIENT
Start: 2018-10-06 | End: 2018-10-07

## 2018-10-06 RX ORDER — DEXTROSE, SODIUM CHLORIDE, AND POTASSIUM CHLORIDE 5; .45; .15 G/100ML; G/100ML; G/100ML
175 INJECTION INTRAVENOUS CONTINUOUS
Status: DISCONTINUED | OUTPATIENT
Start: 2018-10-06 | End: 2018-10-07

## 2018-10-06 RX ORDER — INSULIN LISPRO 100 [IU]/ML
INJECTION, SOLUTION INTRAVENOUS; SUBCUTANEOUS
Status: DISCONTINUED | OUTPATIENT
Start: 2018-10-06 | End: 2018-10-09

## 2018-10-06 RX ORDER — SODIUM BICARBONATE 1 MEQ/ML
50 SYRINGE (ML) INTRAVENOUS
Status: COMPLETED | OUTPATIENT
Start: 2018-10-06 | End: 2018-10-06

## 2018-10-06 RX ADMIN — POTASSIUM CHLORIDE 10 MEQ: 10 INJECTION, SOLUTION INTRAVENOUS at 14:16

## 2018-10-06 RX ADMIN — POTASSIUM CHLORIDE 10 MEQ: 10 INJECTION, SOLUTION INTRAVENOUS at 16:27

## 2018-10-06 RX ADMIN — SODIUM BICARBONATE 50 MEQ: 84 INJECTION INTRAVENOUS at 04:32

## 2018-10-06 RX ADMIN — PIPERACILLIN SODIUM,TAZOBACTAM SODIUM 3.38 G: 3; .375 INJECTION, POWDER, FOR SOLUTION INTRAVENOUS at 04:35

## 2018-10-06 RX ADMIN — INSULIN HUMAN 10 UNITS: 100 INJECTION, SOLUTION PARENTERAL at 04:44

## 2018-10-06 RX ADMIN — SODIUM CHLORIDE 1000 ML: 900 INJECTION, SOLUTION INTRAVENOUS at 01:59

## 2018-10-06 RX ADMIN — SODIUM CHLORIDE AND POTASSIUM CHLORIDE: 9; 2.98 INJECTION, SOLUTION INTRAVENOUS at 09:20

## 2018-10-06 RX ADMIN — Medication 10 ML: at 13:17

## 2018-10-06 RX ADMIN — SODIUM CHLORIDE AND POTASSIUM CHLORIDE: 4.5; 1.49 INJECTION, SOLUTION INTRAVENOUS at 11:36

## 2018-10-06 RX ADMIN — HEPARIN SODIUM 5000 UNITS: 5000 INJECTION INTRAVENOUS; SUBCUTANEOUS at 23:19

## 2018-10-06 RX ADMIN — SODIUM CHLORIDE 1000 ML: 900 INJECTION, SOLUTION INTRAVENOUS at 06:00

## 2018-10-06 RX ADMIN — MUPIROCIN: 20 OINTMENT TOPICAL at 08:38

## 2018-10-06 RX ADMIN — SODIUM CHLORIDE 27.1 UNITS/HR: 900 INJECTION, SOLUTION INTRAVENOUS at 09:22

## 2018-10-06 RX ADMIN — POTASSIUM CHLORIDE 10 MEQ: 10 INJECTION, SOLUTION INTRAVENOUS at 15:17

## 2018-10-06 RX ADMIN — SODIUM CHLORIDE AND POTASSIUM CHLORIDE: 4.5; 1.49 INJECTION, SOLUTION INTRAVENOUS at 18:03

## 2018-10-06 RX ADMIN — MUPIROCIN: 20 OINTMENT TOPICAL at 18:00

## 2018-10-06 RX ADMIN — FAMOTIDINE 20 MG: 10 INJECTION, SOLUTION INTRAVENOUS at 08:38

## 2018-10-06 RX ADMIN — SODIUM CHLORIDE 32 UNITS/HR: 900 INJECTION, SOLUTION INTRAVENOUS at 14:08

## 2018-10-06 RX ADMIN — POTASSIUM CHLORIDE 10 MEQ: 10 INJECTION, SOLUTION INTRAVENOUS at 13:17

## 2018-10-06 RX ADMIN — SODIUM CHLORIDE 28.5 UNITS/HR: 900 INJECTION, SOLUTION INTRAVENOUS at 17:37

## 2018-10-06 RX ADMIN — HEPARIN SODIUM 5000 UNITS: 5000 INJECTION INTRAVENOUS; SUBCUTANEOUS at 08:38

## 2018-10-06 RX ADMIN — VANCOMYCIN HYDROCHLORIDE 2000 MG: 10 INJECTION, POWDER, LYOPHILIZED, FOR SOLUTION INTRAVENOUS at 06:24

## 2018-10-06 RX ADMIN — DEXTROSE MONOHYDRATE, SODIUM CHLORIDE, AND POTASSIUM CHLORIDE 175 ML/HR: 50; 4.5; 1.49 INJECTION, SOLUTION INTRAVENOUS at 19:23

## 2018-10-06 RX ADMIN — SODIUM CHLORIDE 10.8 UNITS/HR: 900 INJECTION, SOLUTION INTRAVENOUS at 05:04

## 2018-10-06 RX ADMIN — HEPARIN SODIUM 5000 UNITS: 5000 INJECTION INTRAVENOUS; SUBCUTANEOUS at 16:31

## 2018-10-06 NOTE — PROGRESS NOTES
10/6/2018 INTENSIVIST PROGRESS NOTE:  
 
Patient seen and evaluated, chart reviewed. Patient admitted this morning with AMS. Found to have DKA. He is lethargic and can provide no history at this time. No known PMH, including no known DM. Visit Vitals  /55  Pulse (!) 108  Temp 97.7 °F (36.5 °C)  Resp 26  
 Ht 6' 2\" (1.88 m)  Wt 117.9 kg (260 lb)  SpO2 97%  BMI 33.38 kg/m2 General:  No acute distress, not interactive, eyes open, however Eyes: anicteric HEENT: NC, scalp staples in place CV:  Tachy, regular Lungs: CTA B, no wheeze Abd: soft, +BS, no focal tenderness Ext: no cyanosis, no clubbing Skin: warm and dry Musculoskeletal: no gross deformities Neuro: not interactive, looking around room, no focal findings CXR: no acute process Head CT negative Labs reviewed A/P: 
- DKA/HHS - insulin, IVF, replete electrolytes per protocol - pancreatitis by enzymes - NPO for now, check triglycerides 
- acute renal failure - IV fluids 
- encephalopathy - metabolic, monitor 
- DVT prophylaxis Johana Cervantes MD

## 2018-10-06 NOTE — CONSULTS
Consultation Note NAME: Cleve Weaver  
:  1970 MRN:  [id number] Date/Time:  10/6/2018 10:10 AM 
 
I have been asked to see this patient by Dr. Ron Webb  for advice/opinion re: PETER. Assessment :    Plan: 
PETER Mild rhabdo DKA Hypokalemia Severe hypernatremia AMS Unclear what has caused PETER. It has improved since admission with IVF so at least some element of volume. Sodium corrects to 173. Will change to hypotonic fluids. Check FENA and quantitate proteinuria. I've ordered a Maldonado as we need strict I's and O's. U/A with large blood and no RBC's c/w rhabdo. CK mildly elevated. Will check again in AM. ADDEDNDUM: called re: labs. Sodium 161, glucose 799. Corrected sodium 172. Continue with current fluids for now. 6:15pm Called by nurse regarding sodium. It is 167. Glucose down to 389. Corrected sodium is now 171. Very slowly better. I'll increase IVF. Creatinine continues to improve. Addendum 2200 - sodium continues to slowly improve. Labs at 4am.  I did not say that I did not want want to be notified of AM labs if there was something of concern. Subjective: CHIEF COMPLAINT:  AMS HISTORY OF PRESENT ILLNESS:    
Airam Aguilar is a 50 y.o.   male admitted with DKA. Pt can give no history so history is obtained from review of his chart. Review shows that he was here earlier this week after he fell in the shower and his his head. No labs were drawn. The patient's roommate apparently called EMS as the patient had worsening AMS. In the ER he was evaluated and found to have a markedly elevated glucose and creatinine. He has been started on an insulin drip and his glucose is improving. No past medical history on file. Past Surgical History:  
Procedure Laterality Date  HX WISDOM TEETH EXTRACTION Social History Substance Use Topics  Smoking status: Never Smoker  Smokeless tobacco: Never Used  Alcohol use Yes No family history on file. No Known Allergies Prior to Admission medications Not on File REVIEW OF SYSTEMS:   
 [x]  Unable to obtain reliable ROS due to  [x] mental status  [] sedated   [] intubated 
 [] Total of 12 systems reviewed as follows: 
Constitutional: negative fever, negative chills, negative weight loss Eyes:   negative visual changes ENT:   negative sore throat, tongue or lip swelling Respiratory:  negative cough, negative dyspnea Cards:  negative for chest pain, palpitations, lower extremity edema GI:   negative for nausea, vomiting, diarrhea, and abdominal pain :  negative for frequency, dysuria Integument:  negative for rash and pruritus Heme:  negative for easy bruising and gum/nose bleeding Musculoskel: negative for myalgias,  back pain and muscle weakness Neuro:  negative for headaches, dizziness, vertigo Psych:  negative for feelings of anxiety, depression Travel?: none Objective: VITALS:   
Visit Vitals  BP 92/62  Pulse (!) 110  Temp 98.5 °F (36.9 °C)  Resp 22  
 Ht 6' 2\" (1.88 m)  Wt 117.9 kg (260 lb)  SpO2 96%  BMI 33.38 kg/m2 PHYSICAL EXAM: 
Gen:  []  WD []  WN  [] cachectic []  thin []  obese []  disheveled [x]  ill apearing  []   Critical  []   Chronic    []  No acute distress HEENT:   [x] NC/AT [x] pink conjunctivae      [] pale conjunctivae PERRL  [] yes  [] no      [] moist mucosa    [] dry mucosa 
  hearing intact to voice [] yes  [] No 
              
NECK:   supple [x] yes  [] no        masses [] yes  [x] No 
             thyroid  []  non tender  []  tender RESP:   [x] CTA bilaterally/no wheezing/rhonchi/rales/crackles [] rhonchi bilaterally - no dullness  [] wheezing   [] rhonchi   [] crackles  
  use of accessory muscles [] yes [] no CARD:   [x]  regular rate and rhythm/No murmurs/rubs/gallops 
  murmur  [] yes ()  [] no      Rubs  [] yes  [] no       Gallops [] yes  [] no 
 Rate []  regular  []  irregular        carotid bruits  [] Right  []  Left LE edema [] yes  [x] no           JVP  []  yes   []  no 
 
ABD:    [x] soft/non distended/non tender/+bowel sounds/no HSM []  Rigid    tenderness [] yes [] no   Liver enlargement  []  yes []  no  
             Spleen enlargement  []  yes []  no     distended []  yes [] no  
  bowel sound  [] hypoactive   [] hyperactive LYMPH:    Neck []  yes [x]  no       Axillae []  yes [x]  no SKIN:   Rashes []  yes   [x]  no    Ulcers []  yes   [x]  no 
             [] tight to palpitation    skin turgor []  good  [] poor  [] decreased Cyanosis/clubbing []  yes []  no 
 
NEUR:   [] cranial nerves II-XII grossly intact    
  [] Cranial nerves deficit 
               []  facial droop    []  slurred speech   [] aphasic  
  [] Strength normal     []  weakness  []  LUE  []   RUE/ []  LLE  []   RLE 
  follows commands  []  yes [x]  no        
 
PSYCH:   insight [x] poor [] good   Alert and Oriented to  [] person  [] place  []  time  
                 [] depressed [] anxious [] agitated  [] lethargic [] stuporous  [] sedated LAB DATA REVIEWED:   
Recent Labs 10/06/18 
 1404  10/06/18 
 8783 WBC  18.9*  20.4* HGB  14.4  15.6 HCT  43.7  53.3*  
PLT  221  264 Recent Labs 10/06/18 
 3042  10/06/18 
 0730  10/06/18 
 4820  10/06/18 
 1183  10/06/18 
 7298 NA  157*   --    --   146*  137  
K  3.0*   --    --   3.8  4.6 CL  122*   --    --   112*  97  
CO2  21   --    --   12*  14* BUN  76*   --    --   76*  82* CREA  5.94*   --    --   5.44*  6.31* GLU  1133*  1325*  1304*  >1500*  >1500* CA  8.3*   --    --   6.5*  9.1 MG  4.2*   --    --   3.3*  4.3*  
PHOS   --    --    --   4.6   --   
 
Recent Labs 10/06/18 
 4508  10/06/18 
 5737 SGOT   --   10* ALT   --   26  
AP   --   178* TBILI   --   0.6 ALB   --   3.4*  
GLOB   --   5.3*  
LPSE  889*   --   
 
 No results for input(s): INR, PTP, APTT in the last 72 hours. No lab exists for component: INREXT No results for input(s): FE, TIBC, PSAT, FERR in the last 72 hours. Recent Labs 10/06/18 
 2199 PH  7.25* PCO2  30* PO2  83 Recent Labs 10/06/18 
 0132 CPK  414* CKMB  2.1 Lab Results Component Value Date/Time Glucose (POC) >600 (HH) 10/06/2018 09:38 AM  
 Glucose (POC) >600 (HH) 10/06/2018 07:19 AM  
 Glucose (POC) >600 (HH) 10/06/2018 06:06 AM  
 Glucose (POC) >600 (HH) 10/06/2018 05:00 AM  
 Glucose (POC) >600 (HH) 10/06/2018 04:59 AM  
 
 
Procedures: see electronic medical records for all procedures/Xrays and details which were not copied into this note but were reviewed prior to creation of Plan.   
________________________________________________________________________ 
  
 
___________________________________________________ Consulting Physician: Stephy Mason MD

## 2018-10-06 NOTE — H&P
Hospitalist Admission Note NAME: Rogers Linder  
:  1970 MRN:  [id number] Date/Time:  10/6/2018 6:35 AM 
 
Patient PCP: None 
______________________________________________________________________ Given the patient's current clinical presentation, I have a high level of concern for decompensation if discharged from the emergency department. Complex decision making was performed, which includes reviewing the patient's available past medical records, laboratory results, and x-ray films. My assessment of this patient's clinical condition and my plan of care is as follows. Assessment / Plan: 
 
 severe DKA Start patient on IV fluid Start patient on bicarb Start patient on insulin drip Admit to the ICU Acute renal failure secondary to dehydration Start patient on IV fluid Follow-up repeat BMP 
-Nephrology consult Leukocytosis most likely reactive Follow blood culture Follow-up her calcitonin Continue IV antibiotic empiric therapy Change mental status likely secondary to metabolic encephalopathy 
-continue insulin drip  
-neuro consult at am   
 
Hypernatremia  
-corrected  
-nephrology consult  
-avoid overcorrection Code Status: full Surrogate Decision Maker: DVT Prophylaxis: heparin GI Prophylaxis: not indicated Baseline: independent Subjective: CHIEF COMPLAINT: change mental status HISTORY OF PRESENT ILLNESS:    
50years old male from home with no significant past medical history presented to the ED for evaluation of change mental status patient had mechanical fall fall in the shower on  and was evaluated in the ED and he had scalp laceration repaired with 4 staples according to the EMS report patient roommate was called EMS today because patient worsening mental status since injury according to the EMS patient baseline alert oriented x3 patient was noticed to be hypotensive on admission blood sugar was noticed to be significantly elevated about 1500.  
   
  
     
   
 
 
 
We were asked to admit for work up and evaluation of the above problems. No past medical history on file. Past Surgical History:  
Procedure Laterality Date  HX WISDOM TEETH EXTRACTION Social History Substance Use Topics  Smoking status: Never Smoker  Smokeless tobacco: Never Used  Alcohol use Yes No family history on file. No Known Allergies Prior to Admission medications Not on File REVIEW OF SYSTEMS:    
I am not able to complete the review of systems because: The patient is intubated and sedated  
y The patient has altered mental status due to his acute medical problems The patient has baseline aphasia from prior stroke(s) The patient has baseline dementia and is not reliable historian The patient is in acute medical distress and unable to provide information Total of 12 systems reviewed as follows:   
   POSITIVE= underlined text  Negative = text not underlined General:  fever, chills, sweats, generalized weakness, weight loss/gain,  
   loss of appetite Eyes:    blurred vision, eye pain, loss of vision, double vision ENT:    rhinorrhea, pharyngitis Respiratory:   cough, sputum production, SOB, HERNANDEZ, wheezing, pleuritic pain  
Cardiology:   chest pain, palpitations, orthopnea, PND, edema, syncope Gastrointestinal:  abdominal pain , N/V, diarrhea, dysphagia, constipation, bleeding Genitourinary:  frequency, urgency, dysuria, hematuria, incontinence Muskuloskeletal :  arthralgia, myalgia, back pain Hematology:  easy bruising, nose or gum bleeding, lymphadenopathy Dermatological: rash, ulceration, pruritis, color change / jaundice Endocrine:   hot flashes or polydipsia Neurological:  headache, dizziness, confusion, focal weakness, paresthesia, Speech difficulties, memory loss, gait difficulty Psychological: Feelings of anxiety, depression, agitation Objective: VITALS:   
Visit Vitals  /55  Pulse (!) 108  Temp 97.7 °F (36.5 °C)  Resp 26  
 Ht 6' 2\" (1.88 m)  Wt 117.9 kg (260 lb)  SpO2 97%  BMI 33.38 kg/m2 PHYSICAL EXAM: 
 
General:    Lethargic HEENT:  Laceration with staples back of the scalp   , anicteric sclerae, red  Conjunctivae , nystagmus ,dry mouth , tongue surface green discoloration No oral ulcers, mucosa moist, throat clear, dentition fair Neck:  Supple, symmetrical,  thyroid: non tender Lungs:   Clear to auscultation bilaterally. No Wheezing or Rhonchi. No rales. Chest wall:  No tenderness  No Accessory muscle use. Heart:   Regular  rhythm,  No  murmur   No edema Abdomen:   Soft, non-tender. Not distended. Bowel sounds normal 
Extremities: No cyanosis. No clubbing,   
  Skin turgor normal, Capillary refill normal, Radial dial pulse 2+ Skin:     Not pale. Not Jaundiced  No rashes Linford Andrew Neurologic  Lethargic unresponsive to verbal and tactile stimuli : _______________________________________________________________________ Care Plan discussed with: 
  Comments Patient Family RN y   
Care Manager Consultant:     
_______________________________________________________________________ Expected  Disposition:  
Home with Family y HH/PT/OT/RN   
SNF/LTC   
[facility]   
________________________________________________________________________ TOTAL TIME:  70 Minutes Critical Care Provided     Minutes non procedure based Comments Reviewed previous records  
>50% of visit spent in counseling and coordination of care  Discussion with patient and/or family and questions answered 
  
 
________________________________________________________________________ Signed:  Melissa Beckham MD 
 
Procedures: see electronic medical records for all procedures/Xrays and details which were not copied into this note but were reviewed prior to creation of Plan. LAB DATA REVIEWED:   
Recent Results (from the past 24 hour(s)) GLUCOSE, POC Collection Time: 10/06/18  1:50 AM  
Result Value Ref Range Glucose (POC) >600 (HH) 65 - 100 mg/dL Performed by Phil Ortiz GLUCOSE, POC Collection Time: 10/06/18  1:51 AM  
Result Value Ref Range Glucose (POC) >600 (HH) 65 - 100 mg/dL Performed by Phil Ortiz EKG, 12 LEAD, INITIAL Collection Time: 10/06/18  1:58 AM  
Result Value Ref Range Ventricular Rate 108 BPM  
 Atrial Rate 108 BPM  
 P-R Interval 132 ms QRS Duration 90 ms Q-T Interval 348 ms QTC Calculation (Bezet) 466 ms Calculated P Axis 62 degrees Calculated R Axis 20 degrees Calculated T Axis -49 degrees Diagnosis Sinus tachycardia Possible Left atrial enlargement T wave abnormality, consider inferior ischemia No previous ECGs available VENOUS BLOOD GAS Collection Time: 10/06/18  2:43 AM  
Result Value Ref Range VENOUS PH 7.14 (LL) 7.32 - 7.42    
 VENOUS PCO2 48 41 - 51 mmHg VENOUS PO2 33 25 - 40 mmHg VENOUS O2 SATURATION 46 (L) 65 - 88 % VENOUS BICARBONATE 16 (L) 23 - 28 mmol/L  
 VENOUS BASE DEFICIT 12.8 mmol/L  
 O2 METHOD ROOM AIR    
 SPONTANEOUS RATE 28.0 Sample source VENOUS    
 SITE OTHER Critical value read back Orlando Mckeon MD   
CBC WITH AUTOMATED DIFF Collection Time: 10/06/18  3:12 AM  
Result Value Ref Range WBC 20.4 (H) 4.1 - 11.1 K/uL  
 RBC 5.58 4.10 - 5.70 M/uL  
 HGB 15.6 12.1 - 17.0 g/dL HCT 53.3 (H) 36.6 - 50.3 % MCV 95.5 80.0 - 99.0 FL  
 MCH 28.0 26.0 - 34.0 PG  
 MCHC 29.3 (L) 30.0 - 36.5 g/dL  
 RDW 15.1 (H) 11.5 - 14.5 % PLATELET 771 452 - 571 K/uL MPV 12.8 8.9 - 12.9 FL  
 NRBC 0.0 0  WBC ABSOLUTE NRBC 0.00 0.00 - 0.01 K/uL NEUTROPHILS 85 (H) 32 - 75 % BAND NEUTROPHILS 3 % LYMPHOCYTES 6 (L) 12 - 49 % MONOCYTES 6 5 - 13 % EOSINOPHILS 0 0 - 7 % BASOPHILS 0 0 - 1 % IMMATURE GRANULOCYTES 0 0.0 - 0.5 % ABS. NEUTROPHILS 18.0 (H) 1.8 - 8.0 K/UL  
 ABS. LYMPHOCYTES 1.2 0.8 - 3.5 K/UL  
 ABS. MONOCYTES 1.2 (H) 0.0 - 1.0 K/UL  
 ABS. EOSINOPHILS 0.0 0.0 - 0.4 K/UL  
 ABS. BASOPHILS 0.0 0.0 - 0.1 K/UL  
 ABS. IMM. GRANS. 0.0 0.00 - 0.04 K/UL  
 DF MANUAL    
 RBC COMMENTS NORMOCYTIC, NORMOCHROMIC    
CK W/ CKMB & INDEX Collection Time: 10/06/18  3:12 AM  
Result Value Ref Range  (H) 39 - 308 U/L  
 CK - MB 2.1 <3.6 NG/ML  
 CK-MB Index 0.5 0 - 2.5 METABOLIC PANEL, COMPREHENSIVE Collection Time: 10/06/18  3:12 AM  
Result Value Ref Range Sodium 137 136 - 145 mmol/L Potassium 4.6 3.5 - 5.1 mmol/L Chloride 97 97 - 108 mmol/L  
 CO2 14 (LL) 21 - 32 mmol/L Anion gap 26 (H) 5 - 15 mmol/L Glucose >1500 (HH) 65 - 100 mg/dL BUN 82 (H) 6 - 20 MG/DL Creatinine 6.31 (H) 0.70 - 1.30 MG/DL  
 BUN/Creatinine ratio 13 12 - 20 GFR est AA 12 (L) >60 ml/min/1.73m2 GFR est non-AA 10 (L) >60 ml/min/1.73m2 Calcium 9.1 8.5 - 10.1 MG/DL Bilirubin, total 0.6 0.2 - 1.0 MG/DL  
 ALT (SGPT) 26 12 - 78 U/L  
 AST (SGOT) 10 (L) 15 - 37 U/L Alk. phosphatase 178 (H) 45 - 117 U/L Protein, total 8.7 (H) 6.4 - 8.2 g/dL Albumin 3.4 (L) 3.5 - 5.0 g/dL Globulin 5.3 (H) 2.0 - 4.0 g/dL A-G Ratio 0.6 (L) 1.1 - 2.2 MAGNESIUM Collection Time: 10/06/18  3:12 AM  
Result Value Ref Range Magnesium 4.3 (H) 1.6 - 2.4 mg/dL TROPONIN I Collection Time: 10/06/18  3:12 AM  
Result Value Ref Range Troponin-I, Qt. 0.06 (H) <0.05 ng/mL LACTIC ACID Collection Time: 10/06/18  3:12 AM  
Result Value Ref Range Lactic acid 3.5 (HH) 0.4 - 2.0 MMOL/L  
AMMONIA Collection Time: 10/06/18  3:12 AM  
Result Value Ref Range Ammonia 19 <49 UMOL/L  
METABOLIC PANEL, BASIC Collection Time: 10/06/18  4:38 AM  
Result Value Ref Range  Sodium 146 (H) 136 - 145 mmol/L  
 Potassium 3.8 3.5 - 5.1 mmol/L Chloride 112 (H) 97 - 108 mmol/L  
 CO2 12 (LL) 21 - 32 mmol/L Anion gap 22 (H) 5 - 15 mmol/L Glucose >1500 (HH) 65 - 100 mg/dL BUN 76 (H) 6 - 20 MG/DL Creatinine 5.44 (H) 0.70 - 1.30 MG/DL  
 BUN/Creatinine ratio 14 12 - 20 GFR est AA 14 (L) >60 ml/min/1.73m2 GFR est non-AA 11 (L) >60 ml/min/1.73m2 Calcium 6.5 (L) 8.5 - 10.1 MG/DL MAGNESIUM Collection Time: 10/06/18  4:38 AM  
Result Value Ref Range Magnesium 3.3 (H) 1.6 - 2.4 mg/dL PHOSPHORUS Collection Time: 10/06/18  4:38 AM  
Result Value Ref Range Phosphorus 4.6 2.6 - 4.7 MG/DL INFLUENZA A & B AG (RAPID TEST) Collection Time: 10/06/18  4:39 AM  
Result Value Ref Range Influenza A Antigen NEGATIVE  NEG Influenza B Antigen NEGATIVE  NEG    
GLUCOSE, POC Collection Time: 10/06/18  4:59 AM  
Result Value Ref Range Glucose (POC) >600 (HH) 65 - 100 mg/dL Performed by Swati Hernandez GLUCOSE, POC Collection Time: 10/06/18  5:00 AM  
Result Value Ref Range Glucose (POC) >600 (HH) 65 - 100 mg/dL Performed by Swati Hernandez Azael Bending Collection Time: 10/06/18  5:04 AM  
Result Value Ref Range Glucose 601 mg/dL Insulin order 10.8 units/hour Insulin adminstered 10.8 units/hour Multiplier 0.020 Low target 150 mg/dL High target 250 mg/dL D50 order 0.0 ml  
 D50 administered 0.00 ml Minutes until next BG 60 min Order initials LEG Administered initials LEG   
 GLSCOM Comments URINALYSIS W/ REFLEX CULTURE Collection Time: 10/06/18  5:12 AM  
Result Value Ref Range Color YELLOW/STRAW Appearance CLEAR CLEAR Specific gravity 1.028 1.003 - 1.030    
 pH (UA) 5.0 5.0 - 8.0 Protein 30 (A) NEG mg/dL Glucose >1000 (A) NEG mg/dL Ketone 15 (A) NEG mg/dL Bilirubin NEGATIVE  NEG Blood LARGE (A) NEG Urobilinogen 0.2 0.2 - 1.0 EU/dL  Nitrites NEGATIVE  NEG    
 Leukocyte Esterase NEGATIVE  NEG    
 WBC 0-4 0 - 4 /hpf  
 RBC 0-5 0 - 5 /hpf Epithelial cells FEW FEW /lpf Bacteria NEGATIVE  NEG /hpf  
 UA:UC IF INDICATED CULTURE NOT INDICATED BY UA RESULT CNI    
GLUCOSE, POC Collection Time: 10/06/18  6:06 AM  
Result Value Ref Range Glucose (POC) >600 (HH) 65 - 100 mg/dL Performed by Andre Angel Collection Time: 10/06/18  6:07 AM  
Result Value Ref Range Glucose 601 mg/dL Insulin order 16.2 units/hour Insulin adminstered 16.2 units/hour Multiplier 0.030 Low target 150 mg/dL High target 250 mg/dL D50 order 0.0 ml  
 D50 administered 0.00 ml Minutes until next BG 60 min Order initials LEG Administered initials LEG   
 GLSCOM Comments

## 2018-10-06 NOTE — ED NOTES
TRANSFER - OUT REPORT: 
 
Verbal report given to Lissett RN(name) on Trever Yi  being transferred to CCU(unit) for routine progression of care Report consisted of patients Situation, Background, Assessment and  
Recommendations(SBAR). Information from the following report(s) SBAR, Kardex, ED Summary, [facility] and Recent Results was reviewed with the receiving nurse. Lines:  
Triple Lumen triple lumen central line 10/06/18 Right Femoral (Active) Central Line Being Utilized Yes 10/6/2018  3:55 AM  
Site Assessment Clean, dry, & intact 10/6/2018  3:55 AM  
Infiltration Assessment 0 10/6/2018  3:55 AM  
Dressing Status Clean, dry, & intact 10/6/2018  3:55 AM  
Proximal Hub Color/Line Status White 10/6/2018  3:55 AM  
Positive Blood Return (Medial Site) Yes 10/6/2018  3:55 AM  
Medial Hub Color/Line Status Blue 10/6/2018  3:55 AM  
Positive Blood Return (Lateral Site) Yes 10/6/2018  3:55 AM  
Distal Hub Color/Line Status Brown 10/6/2018  3:55 AM  
Positive Blood Return (Site #3) Yes 10/6/2018  3:55 AM  
   
Peripheral IV 10/06/18 Left Hand (Active) Site Assessment Clean, dry, & intact 10/6/2018  5:38 AM  
Phlebitis Assessment 0 10/6/2018  5:38 AM  
Infiltration Assessment 0 10/6/2018  5:38 AM  
Dressing Status Clean, dry, & intact 10/6/2018  5:38 AM  
Dressing Type Transparent;Tape 10/6/2018  5:38 AM  
Hub Color/Line Status Green;Patent; Flushed 10/6/2018  5:38 AM  
Action Taken Catheter retaped 10/6/2018  5:38 AM  
  
 
Opportunity for questions and clarification was provided. Patient transported with: 
 Monitor Registered Nurse Tech

## 2018-10-06 NOTE — IP AVS SNAPSHOT
[address] [facility] 
[phone] Patient: Elise Allison MRN: [id number] SFL:[id number] About your hospitalization You were admitted on:  October 6, 2018 You last received care in the:  [facility] CARDIOPULMONARY CARE You were discharged on:  October 12, 2018 Why you were hospitalized Your primary diagnosis was:  Not on File Your diagnoses also included:  Dka (Diabetic Ketoacidoses) (Hcc) Follow-up Information Follow up With Details Comments Contact Info [facility] DI[facility] On 10/19/2018 Diabetes Education at 2:00 pm. Please arrive 10 minutes early and bring photo ID and insurance information with you to appointment. [address] [facility]. [address] [facility] 
[phone] Lizzeth Vizcaino NP On 11/26/2018 10;30am  New PCP appointment. Please arrive 30min early and bring a photo ID, insurance card and a list of medications. Please call sooner if needed [address] [facility] 7 [address] [phone] None   None ([phone]) Patient stated that they have no PCP Ralph Munoz MD   [address] [facility] 
[phone] Colby Soto MD Schedule an appointment as soon as possible for a visit for next week to have your kideny function checked [address]  [address] [facility] 
[phone] Your Scheduled Appointments Friday October 19, 2018  2:00 PM EDT  
DIABETES CLASS 1HR with SURVIVAL SKILLS CLASS [facility] in [place]  
[facility] ([facility]) [address] [facility]. [facility]  
[phone] Monday November 26, 2018 10:30 AM EST New Patient with Lizzeth Vizcaino NP [facility]) [address] [facility] [phone] Discharge Orders None A check rudy indicates which time of day the medication should be taken. My Medications START taking these medications Instructions Each Dose to Equal  
 Morning Noon Evening Bedtime  
 insulin glargine 100 unit/mL (3 mL) Inpn Commonly known as:  LANTUS SOLOSTAR U-100 INSULIN Inject 55 units under the skin nightly. Please provide needles with Rx.  
     
   
   
  
   
  
 insulin lispro 100 unit/mL kwikpen Commonly known as:  HumaLOG KwikPen Insulin Inject 15 units under the skin three times daily before meals + Sliding scale regimen as below, TIDAC (before meals):  140-199=2 u 200-249=3 u 250-299=5 u 300-349=7 u 350 or greaterer = 10u OTHER Florencio Microlet Lancets to check glucose ACHS  
     
   
   
   
  
 OTHER Contour Next Test Strips to check glucose ACHS  
     
   
   
   
  
 OTHER Please excuse Mr. Savannah Larkin from work on 10/6/18 through 10/12/18 as he was in the hospital with an acute illness. He may return to work without restriction. Where to Get Your Medications Information on where to get these meds will be given to you by the nurse or doctor. ! Ask your nurse or doctor about these medications  
  insulin glargine 100 unit/mL (3 mL) Inpn  
 insulin lispro 100 unit/mL kwikpen OTHER  
 OTHER  
 OTHER Discharge Instructions HOSPITALIST DISCHARGE INSTRUCTIONS 
 
NAME: Oly Wagoner  
:  1970 MRN:  [id number] Date/Time:  10/12/2018 9:37 AM 
 
ADMIT DATE: 10/6/2018 DISCHARGE DATE: 10/12/2018 DISCHARGE DIAGNOSIS: 
DKA (diabetic ketoacidoses): resolved Type II Diabetes Hypernatremia: improved Acute Kidney Injury Thrombocytopenia Rhabdomyolysis MEDICATIONS: 
As per medication reconciliation  list 
· It is important that you take the medication exactly as they are prescribed. · Keep your medication in the bottles provided by the pharmacist and keep a list of the medication names, dosages, and times to be taken in your wallet. · Do not take other medications without consulting your doctor. Pain Management: Use tylenol as needed for pain. Avoid NSAIDs as this class of medication is bad for your kidneys. What to do at [facility] Recommended diet:  Diabetic Diet Recommended activity: Activity as tolerated If you experience any of the following symptoms then please call your primary care physician or return to the emergency room if you cannot get hold of your doctor: 
Fever, chills, nausea, vomiting, diarrhea, change in mentation, falling, bleeding, shortness of breath or any other concerning symptoms. Follow Up: With a new PCP as soon as possible. With Dr. Rah Hunter in  2 weeks to have your blood counts checked. With Dr. Genesis Blanco next week to have you kidney function checked. On 10/16 at 9 am. 
 
 
Information obtained by : 
I understand that if any problems occur once I am at home I am to contact my physician. I understand and acknowledge receipt of the instructions indicated above. Physician's or R.N.'s Signature                                                                  Date/Time Patient or Representative Signature                                                          Date/Time [employer] Announcement We are excited to announce that we are making your provider's discharge notes available to you in [employer].   You will see these notes when they are completed and signed by the physician that discharged you from your recent hospital stay. If you have any questions or concerns about any information you see in [employer], please call the Health Information Department where you were seen or reach out to your Primary Care Provider for more information about your plan of care. Introducing [facility]! [facility] introduces [employer] patient portal. Now you can access parts of your medical record, email your doctor's office, and request medication refills online. 1. In your internet browser, go to [email or web address] 2. Click on the First Time User? Click Here link in the Sign In box. You will see the New Member Sign Up page. 3. Enter your [employer] Access Code exactly as it appears below. You will not need to use this code after youve completed the sign-up process. If you do not sign up before the expiration date, you must request a new code. · [employer] Access Code: [email or web address] Expires: 12/30/2018  9:19 AM 
 
4. Enter the last four digits of your Social Security Number (xxxx) and Date of Birth (mm/dd/yyyy) as indicated and click Submit. You will be taken to the next sign-up page. 5. Create a [employer] ID. This will be your [employer] login ID and cannot be changed, so think of one that is secure and easy to remember. 6. Create a [employer] password. You can change your password at any time. 7. Enter your Password Reset Question and Answer. This can be used at a later time if you forget your password. 8. Enter your e-mail address. You will receive e-mail notification when new information is available in [address] 9. Click Sign Up. You can now view and download portions of your medical record. 10. Click the Download Summary menu link to download a portable copy of your medical information. If you have questions, please visit the Frequently Asked Questions section of the [employer] website. Remember, [employer] is NOT to be used for urgent needs. For medical emergencies, dial 911. Now available from your iPhone and Android! Introducing Luke Daly As a [facility] patient, I wanted to make you aware of our electronic visit tool called Luke Daly. [employer] allows you to connect within minutes with a medical provider 24 hours a day, seven days a week via a mobile device or tablet or logging into a secure website from your computer. You can access Luke Daly from anywhere in the [place]. A virtual visit might be right for you when you have a simple condition and feel like you just dont want to get out of bed, or cant get away from work for an appointment, when your regular [facility] provider is not available (evenings, weekends or holidays), or when youre out of town and need minor care. Electronic visits cost only $49 and if the [employer] provider determines a prescription is needed to treat your condition, one can be electronically transmitted to a nearby pharmacy*. Please take a moment to enroll today if you have not already done so. The enrollment process is free and takes just a few minutes. To enroll, please download the [employer] bridgett to your tablet or phone, or visit [email or web address] to enroll on your computer. And, as an [address] [place] [address] patient with a [employer] account, the results of your visits will be scanned into your electronic medical record and your primary care provider will be able to view the scanned results. We urge you to continue to see your regular [facility] provider for your ongoing medical care. And while your primary care provider may not be the one available when you seek a Luke Daly virtual visit, the peace of mind you get from getting a real diagnosis real time can be priceless. For more information on Luke Daly, view our Frequently Asked Questions (FAQs) at [email or web address]. Sincerely, 
 
Jose M Phillip MD 
Chief Medical Officer Chantelle Mcelroy *:  certain medications cannot be prescribed via Luke Daly Unresulted Labs-Please follow up with your PCP about these lab tests Order Current Status SEROTONIN RELEASE ASSAY In process Providers Seen During Your Hospitalization Provider Specialty Primary office phone Yaya Lock MD Emergency Medicine [phone] Shannon Skelton MD Internal Medicine [phone] Napoleon Leslie MD Internal Medicine [phone] Immunizations Administered for This Admission Name Date Influenza Vaccine (Quad) PF 10/10/2018 Your Primary Care Physician (PCP) Primary Care Physician Office Phone Office Fax NONE ** None ** ** None ** You are allergic to the following No active allergies Recent Documentation Height Weight BMI Smoking Status 1.88 m 117.9 kg 33.38 kg/m2 Never Smoker Emergency Contacts Name Discharge Info Relation Home Work Mobile Adriel Plascencia DISCHARGE CAREGIVER [3] Friend [5] [phone] Giuliana Saunders  Girlfriend [18] [phone] Patient Belongings The following personal items are in your possession at time of discharge: 
  Dental Appliances: None  Visual Aid: None      Home Medications: None   Jewelry: None  Clothing: None    Other Valuables: Cell Phone, At bedside Please provide this summary of care documentation to your next provider. Signatures-by signing, you are acknowledging that this After Visit Summary has been reviewed with you and you have received a copy. Patient Signature:  ____________________________________________________________ Date:  ____________________________________________________________  
  
Martinez Philippe Provider Signature:  ____________________________________________________________ Date:  ____________________________________________________________

## 2018-10-06 NOTE — ED PROVIDER NOTES
EMERGENCY DEPARTMENT HISTORY AND PHYSICAL EXAM 
 
 
Date: 10/6/2018 Patient Name: Alber Correa History of Presenting Illness Chief Complaint Patient presents with  Altered mental status Pt arrives via EMS with AMS. Pt fell and hit head Sunday and got staples in the back of the head. Per roommate, patients mental status has declined since then. Pt's BS high per EMS History Provided By: EMS 
 
HPI: Alber Correa, 50 y.o. male presents via EMS to the ED for evaluation of AMS. Per EMS, pt had a mechanical fall in the shower on 10/1 and was evaluated at [facility] ED. Chart review shows pt fell backwards in the shower, hitting his head on the ground. Pt reported LOC. Pt had posterior scalp laceration repaired with 4 staples. EMS states that roommate called them today because of pt's worsening mental status since injury. EMS reports at baseline pt is ambulatory, verbal, and A&O x3. They state SBP was in the 70s upon their arrival with improvement s/p IVF and POC glucose read \"HI. \" EMS states pt denied hx of DM. History of present illness is limited due to mental status change. PCP: None PMHx: Significant for denies PSHx: Significant for denies Social Hx: -tobacco, +EtOH, -Illicit Drugs Past History Past Medical History: No past medical history on file. No known PMHx Past Surgical History: 
Past Surgical History:  
Procedure Laterality Date  HX WISDOM TEETH EXTRACTION Family History: No family history on file. No known family h/o Social History: 
Social History Substance Use Topics  Smoking status: Never Smoker  Smokeless tobacco: Never Used  Alcohol use Yes Allergies: 
No Known Allergies Review of Systems Review of Systems Unable to perform ROS: Mental status change Physical Exam  
Physical Exam  
Constitutional: Vital signs are normal. He appears lethargic.  He appears toxic. He has a sickly appearance. He appears ill. He appears distressed. HENT:  
Head: Normocephalic. Mouth/Throat: Mucous membranes are normal. No posterior oropharyngeal erythema. Laceration with staples on occiput Dry mucous membranes Eyes: Conjunctivae and EOM are normal. Pupils are equal, round, and reactive to light. Pupils 2 mm. No disconjugate at gaze. Horizontal nystagmus Neck: Normal range of motion. Cardiovascular: Normal rate, regular rhythm, normal heart sounds and intact distal pulses. Exam reveals no gallop and no friction rub. No murmur heard. Pulmonary/Chest: Effort normal and breath sounds normal. No respiratory distress. He has no wheezes. He has no rales. He exhibits no tenderness. Abdominal: Soft. Bowel sounds are normal. He exhibits no distension and no mass. There is no tenderness. There is no rebound and no guarding. Musculoskeletal: Normal range of motion. He exhibits no edema, tenderness or deformity. Neurological: He appears lethargic. He displays normal reflexes. No cranial nerve deficit. He exhibits normal muscle tone. GCS eye subscore is 3. GCS verbal subscore is 2. GCS motor subscore is 3. Non verbal. Intermittently following commands. Skin: Skin is warm and dry. No rash noted. Nursing note reviewed. Diagnostic Study Results Labs - Recent Results (from the past 12 hour(s)) SODIUM, UR, RANDOM Collection Time: 10/06/18 11:25 AM  
Result Value Ref Range Sodium,urine random 13 MMOL/L  
CREATININE, UR, RANDOM Collection Time: 10/06/18 11:25 AM  
Result Value Ref Range Creatinine, urine 64.20 mg/dL METABOLIC PANEL, BASIC Collection Time: 10/06/18 11:50 AM  
Result Value Ref Range Sodium 161 (HH) 136 - 145 mmol/L Potassium 3.1 (L) 3.5 - 5.1 mmol/L Chloride 127 (H) 97 - 108 mmol/L  
 CO2 24 21 - 32 mmol/L Anion gap 10 5 - 15 mmol/L Glucose 799 (HH) 65 - 100 mg/dL  BUN 73 (H) 6 - 20 MG/DL  
 Creatinine 5.35 (H) 0.70 - 1.30 MG/DL  
 BUN/Creatinine ratio 14 12 - 20 GFR est AA 14 (L) >60 ml/min/1.73m2 GFR est non-AA 12 (L) >60 ml/min/1.73m2 Calcium 8.6 8.5 - 10.1 MG/DL  
GLUCOSE, POC Collection Time: 10/06/18 11:56 AM  
Result Value Ref Range Glucose (POC) >600 (HH) 65 - 100 mg/dL Performed by [address] [place] [address] Collection Time: 10/06/18 11:56 AM  
Result Value Ref Range Glucose 601 mg/dL Insulin order 27.1 units/hour Insulin adminstered 27.1 units/hour Multiplier 0.050 Low target 150 mg/dL High target 250 mg/dL D50 order 0.0 ml  
 D50 administered 0.00 ml Minutes until next BG 60 min Order initials EMM Administered initials EMM   
 GLSCOM Comments GLUCOSE, POC Collection Time: 10/06/18  1:03 PM  
Result Value Ref Range Glucose (POC) >600 (HH) 65 - 100 mg/dL Performed by [address] [place] [address] Collection Time: 10/06/18  1:06 PM  
Result Value Ref Range Glucose 601 mg/dL Insulin order 32.5 units/hour Insulin adminstered 32.5 units/hour Multiplier 0.060 Low target 150 mg/dL High target 250 mg/dL D50 order 0.0 ml  
 D50 administered 0.00 ml Minutes until next BG 60 min Order initials EMM Administered initials EMM   
 GLSCOM Comments GLUCOSE, POC Collection Time: 10/06/18  2:03 PM  
Result Value Ref Range Glucose (POC) 517 (H) 65 - 100 mg/dL Performed by [address] [place] [address] Collection Time: 10/06/18  2:04 PM  
Result Value Ref Range Glucose 517 mg/dL Insulin order 32.0 units/hour Insulin adminstered 32.0 units/hour Multiplier 0.070 Low target 150 mg/dL High target 250 mg/dL D50 order 0.0 ml  
 D50 administered 0.00 ml Minutes until next BG 60 min Order initials EMM Administered initials EMM   
 GLSCOM Comments GLUCOSE, POC Collection Time: 10/06/18  3:06 PM  
Result Value Ref Range Glucose (POC) 452 (H) 65 - 100 mg/dL Performed by Carter Bassett Tati Connolly Collection Time: 10/06/18  3:06 PM  
Result Value Ref Range Glucose 452 mg/dL Insulin order 31.4 units/hour Insulin adminstered 31.4 units/hour Multiplier 0.080 Low target 150 mg/dL High target 250 mg/dL D50 order 0.0 ml  
 D50 administered 0.00 ml Minutes until next BG 60 min Order initials sm Administered initials sm GLSCOM Comments GLUCOSE, POC Collection Time: 10/06/18  4:11 PM  
Result Value Ref Range Glucose (POC) 373 (H) 65 - 100 mg/dL Performed by [address] [place] [address] Collection Time: 10/06/18  4:13 PM  
Result Value Ref Range Glucose 373 mg/dL Insulin order 28.2 units/hour Insulin adminstered 28.2 units/hour Multiplier 0.090 Low target 150 mg/dL High target 250 mg/dL D50 order 0.0 ml  
 D50 administered 0.00 ml Minutes until next BG 60 min Order initials EMM Administered initials EMM   
 GLSCOM Comments LACTIC ACID Collection Time: 10/06/18  4:41 PM  
Result Value Ref Range Lactic acid 3.8 (HH) 0.4 - 2.0 MMOL/L  
METABOLIC PANEL, BASIC Collection Time: 10/06/18  4:46 PM  
Result Value Ref Range Sodium 167 (HH) 136 - 145 mmol/L Potassium 3.6 3.5 - 5.1 mmol/L Chloride 136 (H) 97 - 108 mmol/L  
 CO2 23 21 - 32 mmol/L Anion gap 8 5 - 15 mmol/L Glucose 398 (H) 65 - 100 mg/dL BUN 76 (H) 6 - 20 MG/DL Creatinine 5.21 (H) 0.70 - 1.30 MG/DL  
 BUN/Creatinine ratio 15 12 - 20 GFR est AA 14 (L) >60 ml/min/1.73m2 GFR est non-AA 12 (L) >60 ml/min/1.73m2 Calcium 8.7 8.5 - 10.1 MG/DL MAGNESIUM Collection Time: 10/06/18  4:46 PM  
Result Value Ref Range Magnesium 4.1 (H) 1.6 - 2.4 mg/dL GLUCOSE, POC Collection Time: 10/06/18  5:17 PM  
Result Value Ref Range Glucose (POC) 345 (H) 65 - 100 mg/dL Performed by Natalia Connolly Collection Time: 10/06/18  5:18 PM  
Result Value Ref Range Glucose 345 mg/dL Insulin order 28.5 units/hour Insulin adminstered 28.5 units/hour Multiplier 0.100 Low target 150 mg/dL High target 250 mg/dL D50 order 0.0 ml  
 D50 administered 0.00 ml Minutes until next BG 60 min Order initials cw Administered initials cw GLSCOM Comments GLUCOSE, POC Collection Time: 10/06/18  6:05 PM  
Result Value Ref Range Glucose (POC) 237 (H) 65 - 100 mg/dL Performed by Alyssia Angel Collection Time: 10/06/18  6:06 PM  
Result Value Ref Range Glucose 237 mg/dL Insulin order 17.7 units/hour Insulin adminstered 17.7 units/hour Multiplier 0.100 Low target 150 mg/dL High target 250 mg/dL D50 order 0.0 ml  
 D50 administered 0.00 ml Minutes until next BG 60 min Order initials cw Administered initials cw GLSCOM Comments GLUCOSE, POC Collection Time: 10/06/18  7:09 PM  
Result Value Ref Range Glucose (POC) 190 (H) 65 - 100 mg/dL Performed by Noel Angel Collection Time: 10/06/18  7:10 PM  
Result Value Ref Range Glucose 190 mg/dL Insulin order 13.0 units/hour Insulin adminstered 13.0 units/hour Multiplier 0.100 Low target 150 mg/dL High target 250 mg/dL D50 order 0.0 ml  
 D50 administered 0.00 ml Minutes until next BG 60 min Order initials cw Administered initials cw GLSCOM Comments GLUCOSE, POC Collection Time: 10/06/18  8:12 PM  
Result Value Ref Range Glucose (POC) 168 (H) 65 - 100 mg/dL Performed by Gogo Angel Collection Time: 10/06/18  8:15 PM  
Result Value Ref Range Glucose 168 mg/dL Insulin order 10.8 units/hour Insulin adminstered 10.8 units/hour Multiplier 0.100 Low target 150 mg/dL High target 250 mg/dL D50 order 0.0 ml  
 D50 administered 0.00 ml Minutes until next BG 60 min Order initials ch   
 Administered initials ch   
 GLSCOM Comments METABOLIC PANEL, BASIC Collection Time: 10/06/18  8:20 PM  
Result Value Ref Range Sodium 170 (HH) 136 - 145 mmol/L Potassium 3.6 3.5 - 5.1 mmol/L Chloride 137 (H) 97 - 108 mmol/L  
 CO2 26 21 - 32 mmol/L Anion gap 7 5 - 15 mmol/L Glucose 177 (H) 65 - 100 mg/dL BUN 73 (H) 6 - 20 MG/DL Creatinine 5.37 (H) 0.70 - 1.30 MG/DL  
 BUN/Creatinine ratio 14 12 - 20 GFR est AA 14 (L) >60 ml/min/1.73m2 GFR est non-AA 11 (L) >60 ml/min/1.73m2 Calcium 8.9 8.5 - 10.1 MG/DL MAGNESIUM Collection Time: 10/06/18  8:20 PM  
Result Value Ref Range Magnesium 4.0 (H) 1.6 - 2.4 mg/dL GLUCOSE, POC Collection Time: 10/06/18  9:12 PM  
Result Value Ref Range Glucose (POC) 162 (H) 65 - 100 mg/dL Performed by Tahmina Oliva Corpus Collection Time: 10/06/18  9:14 PM  
Result Value Ref Range Glucose 162 mg/dL Insulin order 10.2 units/hour Insulin adminstered 10.2 units/hour Multiplier 0.100 Low target 150 mg/dL High target 250 mg/dL D50 order 0.0 ml  
 D50 administered 0.00 ml Minutes until next BG 60 min Order initials ch   
 Administered initials ch   
 GLSCOM Comments GLUCOSE, POC Collection Time: 10/06/18 10:19 PM  
Result Value Ref Range Glucose (POC) 130 (H) 65 - 100 mg/dL Performed by Riky Oliva Corpus Collection Time: 10/06/18 10:22 PM  
Result Value Ref Range Glucose 130 mg/dL Insulin order 5.6 units/hour Insulin adminstered 5.6 units/hour Multiplier 0.080 Low target 150 mg/dL High target 250 mg/dL D50 order 0.0 ml  
 D50 administered 0.00 ml Minutes until next BG 60 min Order initials ch   
 Administered initials ch   
 GLSCOM Comments Radiologic Studies -  
XR CHEST PORT Final Result CT HEAD WO CONT Final Result CT Results  (Last 48 hours) 10/06/18 0215  CT HEAD WO CONT Final result Impression:  IMPRESSION: Normal unenhanced CT examination of the head. Narrative:  EXAM:  CT HEAD WITHOUT CONTRAST INDICATION: Altered mental status. COMPARISON: None. CONTRAST: None. TECHNIQUE: Unenhanced CT of the head was performed using 5 mm images. Brain and  
bone windows were generated. Sagittal and coronal reformations were generated. CT dose reduction was achieved through use of a standardized protocol tailored  
for this examination and automatic exposure control for dose modulation. FINDINGS:  
The ventricles and sulci are normal in size, shape and configuration and  
midline. There is no significant white matter disease. There is no  
intracranial hemorrhage. There is no extra-axial collection, mass, mass effect  
or midline shift. The basilar cisterns are open. No acute infarct is  
identified. The bone windows demonstrate no abnormalities. The visualized  
portions of the paranasal sinuses and mastoid air cells are clear. CXR Results  (Last 48 hours) 10/06/18 0314  XR CHEST PORT Final result Impression:  IMPRESSION: No acute abnormality. Narrative:  EXAM:  XR CHEST PORT. INDICATION: Altered mental status. COMPARISON: None. FINDINGS:   
A portable AP radiograph of the chest was obtained at 0301 hours. Lines and tubes: The patient is on a cardiac monitor. Lungs: The lungs are clear. Pleura: There is no pneumothorax or pleural effusion. Mediastinum: The cardiac and mediastinal contours and pulmonary vascularity are  
normal.  
Bones and soft tissues: The bones and soft tissues are grossly within normal  
limits. Medical Decision Making I am the first provider for this patient. I reviewed the vital signs, available nursing notes, past medical history, past surgical history, family history and social history. Vital Signs-Reviewed the patient's vital signs. Patient Vitals for the past 12 hrs: 
 Temp Pulse Resp BP SpO2  
10/06/18 2200 - (!) 119 27 104/80 94 % 10/06/18 2100 - (!) 119 21 111/73 96 % 10/06/18 2007 98.6 °F (37 °C) (!) 119 20 103/78 95 % 10/06/18 1900 - (!) 125 25 99/73 97 % 10/06/18 1800 - (!) 119 27 108/65 95 % 10/06/18 1702 - (!) 118 30 107/68 93 % 10/06/18 1600 98.9 °F (37.2 °C) (!) 119 27 98/64 94 % 10/06/18 1503 - (!) 118 29 96/68 93 % 10/06/18 1404 - (!) 118 28 92/66 94 % 10/06/18 1302 - (!) 115 26 100/62 -  
10/06/18 1200 97.8 °F (36.6 °C) (!) 114 27 90/51 94 % Pulse Oximetry Analysis - 100% on RA Cardiac Monitor:  
Rate: 108 bpm 
Rhythm: Sinus Tachycardia ED EKG interpretation: 
Rhythm: sinus tachycardia; and regular . Rate (approx.): 108; Axis: normal; P wave: normal; QRS interval: normal ; ST/T wave: T wave abonormality. This EKG was interpreted by Epifanio Oppenheim, MD ED Provider. Records Reviewed: Nursing Notes, Old Medical Records, Previous electrocardiograms, Previous Radiology Studies and Previous Laboratory Studies Provider Notes (Medical Decision Making):  
Patient presenting with altered mental status. Pt has stable vitals and POC glucose was checked immediately upon arrival. DDx: medication toxicity, infection, anemia, electrolyte/metabolic anomoly, hypercapnea, stroke/bleed/mass, dehydration, illicit drug intoxication. Will obtain labwork, UA, EKG and CT imaging of the head, chest xray. Will consider adding toxicologic workup if history unclear or warrants further investigation of toxic source. Will continue to monitor and reassess for admission. ED Course:  
Initial assessment performed. The patients presenting problems have been discussed, and they are in agreement with the care plan formulated and outlined with them. I have encouraged them to ask questions as they arise throughout their visit. Medications  
sodium chloride (NS) flush 5-10 mL (not administered) ADDaptor (  Canceled Entry 10/6/18 0425)  
sodium chloride (NS) flush 5-10 mL ( IntraVENous Canceled Entry 10/7/18 0600)  
sodium chloride (NS) flush 5-10 mL (not administered)  
heparin (porcine) injection 5,000 Units (5,000 Units SubCUTAneous Given 10/6/18 1631) mupirocin (BACTROBAN) 2 % ointment ( Both Nostrils Given 10/6/18 1800) insulin regular (NOVOLIN R, HUMULIN R) 100 Units in 0.9% sodium chloride 100 mL infusion (5.6 Units/hr IntraVENous Rate Change 10/6/18 2222) insulin lispro (HUMALOG) injection (0 Units SubCUTAneous Held 10/6/18 1630) glucose chewable tablet 16 g (not administered) dextrose (D50W) injection syrg 12.5-25 g (not administered) glucagon (GLUCAGEN) injection 1 mg (not administered) 0.45% sodium chloride with KCl 20 mEq/L infusion ( IntraVENous Stopped 10/6/18 1922)  
famotidine (PF) (PEPCID) 20 mg in sodium chloride 0.9% 10 mL injection (not administered) dextrose 5% - 0.45% NaCl with KCl 20 mEq/L infusion (175 mL/hr IntraVENous New Bag 10/6/18 1923)  
sodium chloride 0.9 % bolus infusion 1,000 mL (0 mL IntraVENous IV Completed 10/6/18 0452)  
sodium chloride 0.9 % bolus infusion 1,000 mL (0 mL IntraVENous IV Completed 10/6/18 0452) piperacillin-tazobactam (ZOSYN) 3.375 g in 0.9% sodium chloride (MBP/ADV) 100 mL ADV (3.375 g IntraVENous Given 10/6/18 0435)  
sodium bicarbonate 8.4 % (1 mEq/mL) injection 50 mEq (50 mEq IntraVENous Given 10/6/18 0432) insulin regular (NOVOLIN R, HUMULIN R) injection 10 Units (10 Units IntraVENous Given 10/6/18 0444)  
sodium chloride 0.9 % bolus infusion 1,000 mL (1,000 mL IntraVENous New Bag 10/6/18 0600) potassium chloride 10 mEq in 100 ml IVPB (10 mEq IntraVENous New Bag 10/6/18 1627) 2:58 AM 
Notified that pt bit off his pulseox. Written by Kezia Chandler ED Scribe, as dictated by Moon Mayen MD. 
 
3:20AM 
Nursing staff attempted U/S IV multiple times without success.  Given need for emergent resuscitation, will place emergent central line. Procedure Note - Central Line Placement:  
3:33 AM 
Performed by: Kaelyn Hua MD 
 
Immediately prior to the procedure, the patient was reevaluated and found suitable for the planned procedure and any planned medications. Immediately prior to the procedure a time out was called to verify the correct patient, procedure, equipment, staff, and marking as appropriate. Area was cleansed with Hibiclens and anesthetized with 2 mLs of 1% lidocaine. Prepped and draped in sterile fashion. Landmarks identified. 18 gauge needle with triple lumen catheter was inserted into pt's Right, Femoral Vein with ultrasound guidance. Line sutured in place; sterile dressing applied. Position: Trendelenburg Number of attempts: 1 Estimated blood loss: 1 cc The procedure took 1-15 minutes, and pt tolerated well. 
 
4:07 AM 
Pt reevaluated. ABG reviewed, CO2 low. Will give bicarb. Will start insulin drip. Pt is in SEVERE DKA with lactic acidosis. Elevated WBC noted, will obtain blood cultures and start empiric IV abx. Written by Veronika Cartagena, ED Scribe, as dictated by Kaelyn Hua MD. 
 
CONSULT NOTE:  
4:30 Shadia Jacobs MD spoke with Dr. Martha Magaña, Specialty: Hospitalist 
Discussed pt's hx, disposition, and available diagnostic and imaging results. Reviewed care plans. Consultant will evaluate pt for admission. Written by Veronika Cartagena, DILAN Scribe, as dictated by Kaelyn Hua MD. 
 
Consult Note: 
Meli Stratton MD spoke with Dr. Mireille Montoya Specialty: Nephrology Discussed pts hx, disposition, and available diagnostic and imaging results. Reviewed care plans. Will consult during admission.   
 
CRITICAL CARE NOTE : 
 
6:00 AM 
 
IMPENDING DETERIORATION -Airway, Respiratory, Cardiovascular, CNS, Metabolic and Renal 
ASSOCIATED RISK FACTORS - Hypotension, Shock, Hypoxia, Dysrhythmia, Metabolic changes, Dehydration, Vascular Compromise and CNS Decompensation MANAGEMENT- Bedside Assessment and Supervision of Care INTERPRETATION -  Xrays, CT Scan, Blood Gases, ECG, Blood Pressure and Cardiac Output Measures INTERVENTIONS - hemodynamic mngmt, vascular control, Neurologic interventions , Metobolic interventions and management of severe DKA with coma requiring insulin drip, bicarb gtt, multiple IVF boluses, emergent IV access, frequent reassessments and monitoring CASE REVIEW - Hospitalist, Medical Sub-Specialist, Nursing, Family and Nephrology TREATMENT RESPONSE -Stable PERFORMED BY - Self NOTES   : 
 
I have spent 75 minutes of critical care time involved in lab review, consultations with specialist, family decision- making, bedside attention and documentation. During this entire length of time I was immediately available to the patient . Critical Care: The reason for providing this level of medical care for this critically ill patient was due to a critical illness that impaired one or more vital organ systems, such that there was a high probability of imminent or life threatening deterioration in the patient's condition. This care involved high complexity decision making to assess, manipulate, and support vital system functions, to treat this degree of vital organ system failure, and to prevent further life threatening deterioration of the patients condition. Mónica Abreu MD 
 
Disposition: 
5:55 AM 
Patient is being admitted to the hospital. The results of their tests and reasons for their admission have been discussed with them and/or available family. They convey agreement and understanding for the need to be admitted and for their admission diagnosis. Consultation has been made with the inpatient physician specialist for hospitalization. PLAN: 
1. Admit to hospitalist.  
 
Return to ED if worse Diagnosis Clinical Impression: 1. Diabetic ketoacidosis with coma associated with diabetes mellitus due to underlying condition ([facility] 2. Acute metabolic encephalopathy 3. Lactic acidosis 4. Metabolic acidosis 5. PETER (acute kidney injury) ([address] 6. Acute hypernatremia 7. SIRS (systemic inflammatory response syndrome) (HCC) 8. Acute hypokalemia 9. Traumatic rhabdomyolysis, initial encounter ([facility] [address] Attestations: This note is prepared by Irene Galdamez, acting as Scribe for Ghada Martinez MD. Ghada Martinez MD: The scribe's documentation has been prepared under my direction and personally reviewed by me in its entirety. I confirm that the note above accurately reflects all work, treatment, procedures, and medical decision making performed by me. This note will not be viewable in [address]

## 2018-10-06 NOTE — PROGRESS NOTES
0800: Patient arrived on unit from ED; received bedside and verbal shift report from Daniela Vidal RN. Patient confused, does not follow commands, only word that he will say is \"yes\" and only at times, pupils are equal round and reactive, both eyes jerk/deviate to the right, sinus tach, BP stable, pulses palpable in all 4 extremities, lungs clear, on room air, does not appear to be in pain/discomfort. 1200: Assessment complete, no changes noted, afebrile, vitals stable, does not appear to be in pain/discomfort. 1300: Dr. Carr Shown updated sodium value of 161 on recent BMP, no new order received; continue with current IV fluids. 1600: Assessment complete, no changes noted; afebrile, vitals stable, does not appear to be in pain/discomfort. 1700: Gave bedside and verbal shift report to Stephanie [facility].

## 2018-10-06 NOTE — ED NOTES
Pt biting at tubing, wires, and lines. MD Higgins Patch asked to come to bedside to evaluate need for mitt restraints

## 2018-10-06 NOTE — PROGRESS NOTES
Bedside shift report given to [facility]. IV insulin infusing per protocol. Bilateral soft mitts intact. Patient responds to voice but does not follow command. Bed in low position, wheels locked, bed alarm set for safety. Q4h BMP. Maldonado intact.

## 2018-10-06 NOTE — ED NOTES
Assumed care of patient at this time via EMS. [facility], patient fell and hit his head on Sunday and was seen here for evaluation. Pt was discharged home with staples in the back of his head. EMS states that the patient's roommate has noticed a steady decline in the patient's mental status since Sunday. Yesterday, the patient was laying in his bed all day, wouldn't eat, drink, talk or get up. Pt responds to voice but is barely able to follow commands at this time. Pt's BS for EMS read HI which is a BS >500. Pt has no known history of diabetes that EMS is aware of. Pt not currently taking medications at home.

## 2018-10-07 ENCOUNTER — APPOINTMENT (OUTPATIENT)
Dept: ULTRASOUND IMAGING | Age: 48
DRG: 637 | End: 2018-10-07
Attending: INTERNAL MEDICINE
Payer: COMMERCIAL

## 2018-10-07 LAB
ADMINISTERED INITIALS, ADMINIT: NORMAL
ANION GAP SERPL CALC-SCNC: 8 MMOL/L (ref 5–15)
ANION GAP SERPL CALC-SCNC: 9 MMOL/L (ref 5–15)
BASOPHILS # BLD: 0 K/UL (ref 0–0.1)
BASOPHILS NFR BLD: 0 % (ref 0–1)
BUN SERPL-MCNC: 74 MG/DL (ref 6–20)
BUN SERPL-MCNC: 74 MG/DL (ref 6–20)
BUN/CREAT SERPL: 12 (ref 12–20)
BUN/CREAT SERPL: 12 (ref 12–20)
CALCIUM SERPL-MCNC: 8.1 MG/DL (ref 8.5–10.1)
CALCIUM SERPL-MCNC: 8.7 MG/DL (ref 8.5–10.1)
CHLORIDE SERPL-SCNC: 137 MMOL/L (ref 97–108)
CHLORIDE SERPL-SCNC: 138 MMOL/L (ref 97–108)
CO2 SERPL-SCNC: 24 MMOL/L (ref 21–32)
CO2 SERPL-SCNC: 24 MMOL/L (ref 21–32)
CREAT SERPL-MCNC: 6 MG/DL (ref 0.7–1.3)
CREAT SERPL-MCNC: 6.05 MG/DL (ref 0.7–1.3)
D50 ADMINISTERED, D50ADM: 0 ML
D50 ORDER, D50ORD: 0 ML
DIFFERENTIAL METHOD BLD: ABNORMAL
EOSINOPHIL # BLD: 0 K/UL (ref 0–0.4)
EOSINOPHIL NFR BLD: 0 % (ref 0–7)
ERYTHROCYTE [DISTWIDTH] IN BLOOD BY AUTOMATED COUNT: 13.9 % (ref 11.5–14.5)
GLSCOM COMMENTS: NORMAL
GLUCOSE BLD STRIP.AUTO-MCNC: 127 MG/DL (ref 65–100)
GLUCOSE BLD STRIP.AUTO-MCNC: 143 MG/DL (ref 65–100)
GLUCOSE BLD STRIP.AUTO-MCNC: 144 MG/DL (ref 65–100)
GLUCOSE BLD STRIP.AUTO-MCNC: 151 MG/DL (ref 65–100)
GLUCOSE BLD STRIP.AUTO-MCNC: 155 MG/DL (ref 65–100)
GLUCOSE BLD STRIP.AUTO-MCNC: 165 MG/DL (ref 65–100)
GLUCOSE BLD STRIP.AUTO-MCNC: 166 MG/DL (ref 65–100)
GLUCOSE BLD STRIP.AUTO-MCNC: 166 MG/DL (ref 65–100)
GLUCOSE BLD STRIP.AUTO-MCNC: 185 MG/DL (ref 65–100)
GLUCOSE BLD STRIP.AUTO-MCNC: 186 MG/DL (ref 65–100)
GLUCOSE BLD STRIP.AUTO-MCNC: 187 MG/DL (ref 65–100)
GLUCOSE BLD STRIP.AUTO-MCNC: 190 MG/DL (ref 65–100)
GLUCOSE BLD STRIP.AUTO-MCNC: 193 MG/DL (ref 65–100)
GLUCOSE BLD STRIP.AUTO-MCNC: 196 MG/DL (ref 65–100)
GLUCOSE BLD STRIP.AUTO-MCNC: 222 MG/DL (ref 65–100)
GLUCOSE BLD STRIP.AUTO-MCNC: 232 MG/DL (ref 65–100)
GLUCOSE BLD STRIP.AUTO-MCNC: 233 MG/DL (ref 65–100)
GLUCOSE BLD STRIP.AUTO-MCNC: 242 MG/DL (ref 65–100)
GLUCOSE SERPL-MCNC: 231 MG/DL (ref 65–100)
GLUCOSE SERPL-MCNC: 245 MG/DL (ref 65–100)
GLUCOSE, GLC: 127 MG/DL
GLUCOSE, GLC: 143 MG/DL
GLUCOSE, GLC: 144 MG/DL
GLUCOSE, GLC: 151 MG/DL
GLUCOSE, GLC: 155 MG/DL
GLUCOSE, GLC: 165 MG/DL
GLUCOSE, GLC: 166 MG/DL
GLUCOSE, GLC: 166 MG/DL
GLUCOSE, GLC: 185 MG/DL
GLUCOSE, GLC: 186 MG/DL
GLUCOSE, GLC: 187 MG/DL
GLUCOSE, GLC: 190 MG/DL
GLUCOSE, GLC: 193 MG/DL
GLUCOSE, GLC: 196 MG/DL
GLUCOSE, GLC: 222 MG/DL
GLUCOSE, GLC: 233 MG/DL
GLUCOSE, GLC: 242 MG/DL
HCT VFR BLD AUTO: 43.4 % (ref 36.6–50.3)
HGB BLD-MCNC: 14.8 G/DL (ref 12.1–17)
HIGH TARGET, HITG: 250 MG/DL
IMM GRANULOCYTES # BLD: 0.1 K/UL (ref 0–0.04)
IMM GRANULOCYTES NFR BLD AUTO: 1 % (ref 0–0.5)
INSULIN ADMINSTERED, INSADM: 2.6 UNITS/HOUR
INSULIN ADMINSTERED, INSADM: 2.9 UNITS/HOUR
INSULIN ADMINSTERED, INSADM: 3.3 UNITS/HOUR
INSULIN ADMINSTERED, INSADM: 3.3 UNITS/HOUR
INSULIN ADMINSTERED, INSADM: 3.4 UNITS/HOUR
INSULIN ADMINSTERED, INSADM: 3.4 UNITS/HOUR
INSULIN ADMINSTERED, INSADM: 3.9 UNITS/HOUR
INSULIN ADMINSTERED, INSADM: 4 UNITS/HOUR
INSULIN ADMINSTERED, INSADM: 4.1 UNITS/HOUR
INSULIN ADMINSTERED, INSADM: 4.2 UNITS/HOUR
INSULIN ADMINSTERED, INSADM: 4.3 UNITS/HOUR
INSULIN ADMINSTERED, INSADM: 4.6 UNITS/HOUR
INSULIN ADMINSTERED, INSADM: 5 UNITS/HOUR
INSULIN ADMINSTERED, INSADM: 5.4 UNITS/HOUR
INSULIN ADMINSTERED, INSADM: 5.6 UNITS/HOUR
INSULIN ORDER, INSORD: 2.6 UNITS/HOUR
INSULIN ORDER, INSORD: 2.9 UNITS/HOUR
INSULIN ORDER, INSORD: 3.3 UNITS/HOUR
INSULIN ORDER, INSORD: 3.3 UNITS/HOUR
INSULIN ORDER, INSORD: 3.4 UNITS/HOUR
INSULIN ORDER, INSORD: 3.4 UNITS/HOUR
INSULIN ORDER, INSORD: 3.9 UNITS/HOUR
INSULIN ORDER, INSORD: 4 UNITS/HOUR
INSULIN ORDER, INSORD: 4.1 UNITS/HOUR
INSULIN ORDER, INSORD: 4.2 UNITS/HOUR
INSULIN ORDER, INSORD: 4.3 UNITS/HOUR
INSULIN ORDER, INSORD: 4.6 UNITS/HOUR
INSULIN ORDER, INSORD: 5 UNITS/HOUR
INSULIN ORDER, INSORD: 5.4 UNITS/HOUR
INSULIN ORDER, INSORD: 5.6 UNITS/HOUR
LIPASE SERPL-CCNC: 114 U/L (ref 73–393)
LOW TARGET, LOT: 150 MG/DL
LYMPHOCYTES # BLD: 1.5 K/UL (ref 0.8–3.5)
LYMPHOCYTES NFR BLD: 8 % (ref 12–49)
MAGNESIUM SERPL-MCNC: 3.6 MG/DL (ref 1.6–2.4)
MCH RBC QN AUTO: 27.6 PG (ref 26–34)
MCHC RBC AUTO-ENTMCNC: 34.1 G/DL (ref 30–36.5)
MCV RBC AUTO: 81 FL (ref 80–99)
MINUTES UNTIL NEXT BG, NBG: 120 MIN
MINUTES UNTIL NEXT BG, NBG: 60 MIN
MONOCYTES # BLD: 1.5 K/UL (ref 0–1)
MONOCYTES NFR BLD: 9 % (ref 5–13)
MULTIPLIER, MUL: 0.03
MULTIPLIER, MUL: 0.04
MULTIPLIER, MUL: 0.04
MULTIPLIER, MUL: 0.05
MULTIPLIER, MUL: 0.05
NEUTS SEG # BLD: 14.6 K/UL (ref 1.8–8)
NEUTS SEG NFR BLD: 82 % (ref 32–75)
NRBC # BLD: 0.04 K/UL (ref 0–0.01)
NRBC BLD-RTO: 0.2 PER 100 WBC
ORDER INITIALS, ORDINIT: NORMAL
PHOSPHATE SERPL-MCNC: 1.2 MG/DL (ref 2.6–4.7)
PLATELET # BLD AUTO: 163 K/UL (ref 150–400)
PMV BLD AUTO: 11.1 FL (ref 8.9–12.9)
POTASSIUM SERPL-SCNC: 4.1 MMOL/L (ref 3.5–5.1)
POTASSIUM SERPL-SCNC: 4.2 MMOL/L (ref 3.5–5.1)
RBC # BLD AUTO: 5.36 M/UL (ref 4.1–5.7)
SERVICE CMNT-IMP: ABNORMAL
SODIUM SERPL-SCNC: 170 MMOL/L (ref 136–145)
SODIUM SERPL-SCNC: 170 MMOL/L (ref 136–145)
TRIGL SERPL-MCNC: 189 MG/DL (ref ?–150)
WBC # BLD AUTO: 17.7 K/UL (ref 4.1–11.1)

## 2018-10-07 PROCEDURE — 65610000006 HC RM INTENSIVE CARE

## 2018-10-07 PROCEDURE — 84478 ASSAY OF TRIGLYCERIDES: CPT | Performed by: INTERNAL MEDICINE

## 2018-10-07 PROCEDURE — 85027 COMPLETE CBC AUTOMATED: CPT | Performed by: INTERNAL MEDICINE

## 2018-10-07 PROCEDURE — 74011000250 HC RX REV CODE- 250: Performed by: INTERNAL MEDICINE

## 2018-10-07 PROCEDURE — 84100 ASSAY OF PHOSPHORUS: CPT | Performed by: INTERNAL MEDICINE

## 2018-10-07 PROCEDURE — 80048 BASIC METABOLIC PNL TOTAL CA: CPT | Performed by: INTERNAL MEDICINE

## 2018-10-07 PROCEDURE — 83690 ASSAY OF LIPASE: CPT | Performed by: INTERNAL MEDICINE

## 2018-10-07 PROCEDURE — 80048 BASIC METABOLIC PNL TOTAL CA: CPT | Performed by: GENERAL ACUTE CARE HOSPITAL

## 2018-10-07 PROCEDURE — 74011636637 HC RX REV CODE- 636/637: Performed by: INTERNAL MEDICINE

## 2018-10-07 PROCEDURE — 74011250636 HC RX REV CODE- 250/636: Performed by: INTERNAL MEDICINE

## 2018-10-07 PROCEDURE — 74011000258 HC RX REV CODE- 258: Performed by: INTERNAL MEDICINE

## 2018-10-07 PROCEDURE — 74011250636 HC RX REV CODE- 250/636: Performed by: GENERAL ACUTE CARE HOSPITAL

## 2018-10-07 PROCEDURE — 82962 GLUCOSE BLOOD TEST: CPT

## 2018-10-07 PROCEDURE — 74011250637 HC RX REV CODE- 250/637: Performed by: GENERAL ACUTE CARE HOSPITAL

## 2018-10-07 PROCEDURE — 83735 ASSAY OF MAGNESIUM: CPT | Performed by: INTERNAL MEDICINE

## 2018-10-07 PROCEDURE — 76770 US EXAM ABDO BACK WALL COMP: CPT

## 2018-10-07 PROCEDURE — 74011000250 HC RX REV CODE- 250: Performed by: GENERAL ACUTE CARE HOSPITAL

## 2018-10-07 PROCEDURE — 82550 ASSAY OF CK (CPK): CPT | Performed by: INTERNAL MEDICINE

## 2018-10-07 PROCEDURE — 36415 COLL VENOUS BLD VENIPUNCTURE: CPT | Performed by: INTERNAL MEDICINE

## 2018-10-07 RX ORDER — DEXTROSE MONOHYDRATE 50 MG/ML
125 INJECTION, SOLUTION INTRAVENOUS CONTINUOUS
Status: DISCONTINUED | OUTPATIENT
Start: 2018-10-07 | End: 2018-10-08

## 2018-10-07 RX ORDER — DEXTROSE, SODIUM CHLORIDE, AND POTASSIUM CHLORIDE 5; .2; .15 G/100ML; G/100ML; G/100ML
125 INJECTION INTRAVENOUS CONTINUOUS
Status: DISCONTINUED | OUTPATIENT
Start: 2018-10-07 | End: 2018-10-07

## 2018-10-07 RX ADMIN — FAMOTIDINE 20 MG: 10 INJECTION, SOLUTION INTRAVENOUS at 09:16

## 2018-10-07 RX ADMIN — SODIUM CHLORIDE 4.1 UNITS/HR: 900 INJECTION, SOLUTION INTRAVENOUS at 09:15

## 2018-10-07 RX ADMIN — SODIUM CHLORIDE 3.3 UNITS/HR: 900 INJECTION, SOLUTION INTRAVENOUS at 14:17

## 2018-10-07 RX ADMIN — MUPIROCIN: 20 OINTMENT TOPICAL at 09:21

## 2018-10-07 RX ADMIN — SODIUM CHLORIDE 4.2 UNITS/HR: 900 INJECTION, SOLUTION INTRAVENOUS at 21:42

## 2018-10-07 RX ADMIN — DEXTROSE MONOHYDRATE, SODIUM CHLORIDE, AND POTASSIUM CHLORIDE 175 ML/HR: 50; 4.5; 1.49 INJECTION, SOLUTION INTRAVENOUS at 01:05

## 2018-10-07 RX ADMIN — DEXTROSE MONOHYDRATE 125 ML/HR: 5 INJECTION, SOLUTION INTRAVENOUS at 18:29

## 2018-10-07 RX ADMIN — Medication 10 ML: at 23:50

## 2018-10-07 RX ADMIN — MUPIROCIN: 20 OINTMENT TOPICAL at 18:29

## 2018-10-07 RX ADMIN — SODIUM PHOSPHATE, MONOBASIC, MONOHYDRATE AND SODIUM PHOSPHATE, DIBASIC ANHYDROUS: 276; 142 INJECTION, SOLUTION INTRAVENOUS at 11:21

## 2018-10-07 RX ADMIN — DEXTROSE MONOHYDRATE, SODIUM CHLORIDE, AND POTASSIUM CHLORIDE 175 ML/HR: 50; 4.5; 1.49 INJECTION, SOLUTION INTRAVENOUS at 08:08

## 2018-10-07 RX ADMIN — DEXTROSE MONOHYDRATE 125 ML/HR: 5 INJECTION, SOLUTION INTRAVENOUS at 10:04

## 2018-10-07 RX ADMIN — Medication 10 ML: at 14:17

## 2018-10-07 RX ADMIN — HEPARIN SODIUM 5000 UNITS: 5000 INJECTION INTRAVENOUS; SUBCUTANEOUS at 09:16

## 2018-10-07 RX ADMIN — SODIUM CHLORIDE 3.9 UNITS/HR: 900 INJECTION, SOLUTION INTRAVENOUS at 19:38

## 2018-10-07 RX ADMIN — SODIUM CHLORIDE 4.3 UNITS/HR: 900 INJECTION, SOLUTION INTRAVENOUS at 03:46

## 2018-10-07 RX ADMIN — DEXTROSE MONOHYDRATE, SODIUM CHLORIDE, AND POTASSIUM CHLORIDE 125 ML/HR: 50; 2.25; 1.49 INJECTION, SOLUTION INTRAVENOUS at 09:16

## 2018-10-07 RX ADMIN — HEPARIN SODIUM 5000 UNITS: 5000 INJECTION INTRAVENOUS; SUBCUTANEOUS at 17:12

## 2018-10-07 NOTE — PROGRESS NOTES
Bedside report received from Jad Tolentino. Pt on insulin gtt and D5% 0.45%NS w/20 mEq KCL, transitioned to D5% with appropriately trending BG. Q4hr BMP changed to Q8hr. Na 170 (10/7 1200, 1800 result pending). Neuro status progressively improving throughout the shift. Pt can hold a conversation but with difficulty and word searching. Unclear if it is neuro defiecit or language barrier. Pt is from [facility]. Vital signs stable. Urine output continues with gradual increasing throughout the shift. See chart for other details.

## 2018-10-07 NOTE — PROGRESS NOTES
NAME: Michael Keys  
     :  1970 MRN:  [id number] Assessment :    Plan: 
--PETER 
rhabdo DKA Hypokalemia Severe hypernatremia AMS --Creatinine worse. Fair UO. Will get a renal U/S. CK increasing. Sodium about the same. Will change to D5W. Continue with frequent labs. Replete phos IV. Subjective: Chief Complaint:  Confused. Review of Systems: 
 
Symptom Y/N Comments  Symptom Y/N Comments Fever/Chills    Chest Pain Poor Appetite    Edema Cough    Abdominal Pain Sputum    Joint Pain SOB/HERNANDEZ    Pruritis/Rash Nausea/vomit    Tolerating PT/OT Diarrhea    Tolerating Diet Constipation    Other Could not obtain due to:   
 
Objective: VITALS:  
Last 24hrs VS reviewed since prior progress note. Most recent are: 
Visit Vitals  BP (!) 139/99 (BP Patient Position: At rest;Supine)  Pulse (!) 112  Temp 97.7 °F (36.5 °C)  Resp 24  
 Ht 6' 2\" (1.88 m)  Wt 117.9 kg (260 lb)  SpO2 99%  BMI 33.38 kg/m2 Intake/Output Summary (Last 24 hours) at 10/07/18 2268 Last data filed at 10/07/18 1382 Gross per 24 hour Intake          1149.79 ml Output              930 ml Net           219.79 ml Telemetry Reviewed: PHYSICAL EXAM: 
General: NAD 
CTA 
RRR 
abd soft No edema Lab Data Reviewed: (see below) Medications Reviewed: (see below) PMH/SH reviewed - no change compared to H&P 
________________________________________________________________________ Care Plan discussed with: 
Patient Family RN Care Manager Consultant:     
 
  Comments >50% of visit spent in counseling and coordination of care    
 
________________________________________________________________________ Keene Pallas, MD  
 
Procedures: see electronic medical records for all procedures/Xrays and details which 
 were not copied into this note but were reviewed prior to creation of Plan. LABS: 
Recent Labs 10/07/18 
 4792  10/06/18 
 7584 WBC  17.7*  18.9* HGB  14.8  14.4 HCT  43.4  43.7 PLT  163  221 Recent Labs 10/07/18 
 5987  10/06/18 
 2020  10/06/18 
 1646   10/06/18 
 6365   10/06/18 
 7794 NA  171*  170*  167*   < >  157*   --   146*  
K  4.1  3.6  3.6   < >  3.0*   --   3.8 CL  138*  137*  136*   < >  122*   --   112* CO2  26  26  23   < >  21   --   12* BUN  73*  73*  76*   < >  76*   --   76* CREA  5.79*  5.37*  5.21*   < >  5.94*   --   5.44* GLU  189*  177*  398*   < >  1133*   < >  >1500* CA  8.9  8.9  8.7   < >  8.3*   --   6.5* MG  3.6*  4.0*  4.1*   --   4.2*   --   3.3*  
PHOS  1.2*   --    --    --   1.2*   --   4.6  
 < > = values in this interval not displayed. Recent Labs 10/06/18 
 1267  10/06/18 
 6730 SGOT   --   10* AP   --   178* TP   --   8.7* ALB   --   3.4*  
GLOB   --   5.3*  
LPSE  889*   -- No results for input(s): INR, PTP, APTT in the last 72 hours. No lab exists for component: INREXT No results for input(s): FE, TIBC, PSAT, FERR in the last 72 hours. No results found for: FOL, RBCF Recent Labs 10/06/18 
 8281 PH  7.25* PCO2  30* PO2  83 Recent Labs 10/07/18 
 3484  10/06/18 
 7566 CPK  98059*  414* CKMB   --   2.1 No components found for: [place] Lab Results Component Value Date/Time  Color YELLOW/STRAW 10/06/2018 05:12 AM  
 Appearance CLEAR 10/06/2018 05:12 AM  
 Specific gravity 1.028 10/06/2018 05:12 AM  
 pH (UA) 5.0 10/06/2018 05:12 AM  
 Protein 30 (A) 10/06/2018 05:12 AM  
 Glucose >1000 (A) 10/06/2018 05:12 AM  
 Ketone 15 (A) 10/06/2018 05:12 AM  
 Bilirubin NEGATIVE  10/06/2018 05:12 AM  
 Urobilinogen 0.2 10/06/2018 05:12 AM  
 Nitrites NEGATIVE  10/06/2018 05:12 AM  
 Leukocyte Esterase NEGATIVE  10/06/2018 05:12 AM  
 Epithelial cells FEW 10/06/2018 05:12 AM  
 Bacteria NEGATIVE  10/06/2018 05:12 AM  
 WBC 0-4 10/06/2018 05:12 AM  
 RBC 0-5 10/06/2018 05:12 AM  
 
 
MEDICATIONS: 
Current Facility-Administered Medications Medication Dose Route Frequency  dextrose 5% infusion  125 mL/hr IntraVENous CONTINUOUS  
 sodium chloride (NS) flush 5-10 mL  5-10 mL IntraVENous PRN  
 sodium chloride (NS) flush 5-10 mL  5-10 mL IntraVENous Q8H  
 sodium chloride (NS) flush 5-10 mL  5-10 mL IntraVENous PRN  
 heparin (porcine) injection 5,000 Units  5,000 Units SubCUTAneous Q8H  
 mupirocin (BACTROBAN) 2 % ointment   Both Nostrils BID  insulin regular (NOVOLIN R, HUMULIN R) 100 Units in 0.9% sodium chloride 100 mL infusion  0-50 Units/hr IntraVENous TITRATE  insulin lispro (HUMALOG) injection   SubCUTAneous TIDAC  glucose chewable tablet 16 g  4 Tab Oral PRN  
 dextrose (D50W) injection syrg 12.5-25 g  25-50 mL IntraVENous PRN  
 glucagon (GLUCAGEN) injection 1 mg  1 mg IntraMUSCular PRN  
 famotidine (PF) (PEPCID) 20 mg in sodium chloride 0.9% 10 mL injection  20 mg IntraVENous Q24H

## 2018-10-07 NOTE — PROGRESS NOTES
Spoke with Dr. Ajith Juarez of hospitalist service regarding patient plan of care. No new orders. WCTM.

## 2018-10-07 NOTE — PROGRESS NOTES
Problem: Falls - Risk of 
Goal: *Absence of Falls Document Emily Cornea Fall Risk and appropriate interventions in the flowsheet. Outcome: Not Progressing Towards Goal 
Fall Risk Interventions: 
  
 
Mentation Interventions: Bed/chair exit alarm, Evaluate medications/consider consulting pharmacy Medication Interventions: Bed/chair exit alarm, Evaluate medications/consider consulting pharmacy Elimination Interventions: Call light in reach History of Falls Interventions: Bed/chair exit alarm Problem: Patient Education: Go to Patient Education Activity Goal: Patient/Family Education Outcome: Not Progressing Towards Goal 
Variance: Patient Condition Comments: Language barrier Problem: Patient Education: Go to Patient Education Activity Goal: Patient/Family Education Outcome: Not Progressing Towards Goal 
Variance: Patient Condition Comments: Language barrier

## 2018-10-07 NOTE — PROGRESS NOTES
10/7/2018 INTENSIVIST PROGRESS NOTE:  
 
Patient seen and evaluated, chart reviewed. Patient admitted with AMS. Found to have DKA. Severely hypernatremic. Altered severely on admission. Now can answer a few simple questions and follow commands, but he say \"OK\" to many questions. Visit Vitals  BP (!) 139/99 (BP Patient Position: At rest;Supine)  Pulse (!) 112  Temp 97.7 °F (36.5 °C)  Resp 24  
 Ht 6' 2\" (1.88 m)  Wt 117.9 kg (260 lb)  SpO2 99%  BMI 33.38 kg/m2 General:  No acute distress Eyes: anicteric HEENT: NC, scalp staples in place CV:  Tachy, regular Lungs: CTA B, no wheeze Abd: soft, +BS, no focal tenderness Ext: no cyanosis, no clubbing Skin: warm and dry Musculoskeletal: no gross deformities Neuro: follows commands Labs reviewed A/P: 
- DKA/HHS - insulin, IVF, replete electrolytes per protocol 
- severe hypernatremia - change 1/2 NS to 1/4 NS until nephrology has a chance to re-evaluate - pancreatitis by enzymes - NPO for now, triglycerides sent but never resulted. .., will request again 
- acute renal failure - IV fluids 
- rhabdomyolysis - IV fluids 
- encephalopathy - metabolic, monitor 
- DVT prophylaxis Ivana Brown MD

## 2018-10-07 NOTE — PROGRESS NOTES
Repeat page placed to on call Nephrologist. [facility] Spoke with Dr. Dianna Malcolm regarding patient HR, UOP and labs. Orders to cancel Q4 labs just do the 4AM labs (doesn't want to be notifed of AM labs). WTCM.

## 2018-10-07 NOTE — PROGRESS NOTES
Hospitalist Progress Note NAME: Harjit Constantino  
:  1970 MRN:  [id number] Assessment / Plan: AMS secondary to DKA and/or HHS Start patient on IV fluid Start patient on bicarb Start patient on insulin drip Admit to the ICU 
 
10/7: 
Keep NPO, continue IVF - now on D5W, continue Insulin drip Pt remains critically - keep in ICU Hypernatremia Hypokalemia Hypophosphatemia Acute renal failure secondary to dehydration and rhabdomyolysis Start patient on IV fluid Follow-up repeat BMP 
-Nephrology consult 10/7: 
Renal function worsening. CK elevated. Continue to monitor. Continue IV. Nephro eval noted. Replete electrolytes. Still having reasonable UOP. Continue to monitor I/Os strictly. Leukocytosis most likely reactive Follow blood culture Follow-up her calcitonin Continue IV antibiotic empiric therapy 
  
 
30.0 - 39.9 Obese / Body mass index is 33.38 kg/(m^2). Code status: Full Prophylaxis: Heparin Recommended Disposition: TBD Subjective: Chief Complaint / Reason for Physician Visit AMS. Discussed with RN events overnight. Review of Systems: 
Symptom Y/N Comments  Symptom Y/N Comments Fever/Chills    Chest Pain Poor Appetite    Edema Cough    Abdominal Pain Sputum    Joint Pain SOB/HERNANDEZ    Pruritis/Rash Nausea/vomit    Tolerating PT/OT Diarrhea    Tolerating Diet Constipation    Other Could NOT obtain due to: AMS Objective: VITALS:  
Last 24hrs VS reviewed since prior progress note. Most recent are: 
Patient Vitals for the past 24 hrs: 
 Temp Pulse Resp BP SpO2  
10/07/18 1000 98.2 °F (36.8 °C) (!) 115 18 124/88 100 % 10/07/18 0900 - (!) 114 24 (!) 127/98 99 % 10/07/18 0800 - (!) 111 24 (!) 137/95 98 % 10/07/18 0700 97.7 °F (36.5 °C) (!) 112 24 (!) 139/99 99 % 10/07/18 0600 - (!) 111 24 127/88 -  
10/07/18 0500 - (!) 113 26 (!) 131/97 -  
10/07/18 0400 98.5 °F (36.9 °C) (!) 112 19 136/89 99 % 10/07/18 0300 - (!) 116 17 (!) 117/91 96 % 10/07/18 0200 - (!) 120 23 126/83 -  
10/07/18 0150 - (!) 113 24 - 96 % 10/07/18 0100 - (!) 122 24 94/64 -  
10/07/18 0000 - (!) 114 24 115/79 96 % 10/06/18 2300 98.4 °F (36.9 °C) (!) 116 28 114/78 97 % 10/06/18 2200 - (!) 119 27 104/80 94 % 10/06/18 2100 - (!) 119 21 111/73 96 % 10/06/18 2007 98.6 °F (37 °C) (!) 119 20 103/78 95 % 10/06/18 1900 - (!) 125 25 99/73 97 % 10/06/18 1800 - (!) 119 27 108/65 95 % 10/06/18 1702 - (!) 118 30 107/68 93 % 10/06/18 1600 98.9 °F (37.2 °C) (!) 119 27 98/64 94 % 10/06/18 1503 - (!) 118 29 96/68 93 % 10/06/18 1404 - (!) 118 28 92/66 94 % 10/06/18 1302 - (!) 115 26 100/62 -  
10/06/18 1200 97.8 °F (36.6 °C) (!) 114 27 90/51 94 % 10/06/18 1100 - (!) 113 25 110/69 99 % Intake/Output Summary (Last 24 hours) at 10/07/18 1046 Last data filed at 10/07/18 1004 Gross per 24 hour Intake          1905.86 ml Output             1005 ml Net           900.86 ml PHYSICAL EXAM: 
General: Drowsy, arousable, confused EENT:  EOMI. Anicteric sclerae. MMM Resp:  CTA bilaterally, no wheezing or rales. No accessory muscle use CV:  Regular  rhythm,  No edema GI:  Soft, Non distended, Non tender.  +Bowel sounds Neurologic:  Alert and oriented X 0, responds \"yeah\" to every question asked, moving all extremities Psych:   Deferred Skin:  No rashes. No jaundice Reviewed most current lab test results and cultures  YES Reviewed most current radiology test results   YES Review and summation of old records today    NO Reviewed patient's current orders and MAR    YES 
PMH/SH reviewed - no change compared to H&P 
________________________________________________________________________ Care Plan discussed with: 
  Comments Patient x Family RN x Care Manager Consultant                   Multidiciplinary team rounds were held today with case manager, nursing, pharmacist and clinical coordinator. Patient's plan of care was discussed; medications were reviewed and discharge planning was addressed. ________________________________________________________________________ Total NON critical care TIME:  25   Minutes Total CRITICAL CARE TIME Spent:   Minutes non procedure based Comments >50% of visit spent in counseling and coordination of care    
________________________________________________________________________ Debbie Melissa MD  
 
Procedures: see electronic medical records for all procedures/Xrays and details which were not copied into this note but were reviewed prior to creation of Plan. LABS: 
I reviewed today's most current labs and imaging studies. Pertinent labs include: 
Recent Labs 10/07/18 
 6141  10/06/18 
 9069  10/06/18 
 8412 WBC  17.7*  18.9*  20.4* HGB  14.8  14.4  15.6 HCT  43.4  43.7  53.3*  
PLT  163  221  264 Recent Labs 10/07/18 
 4487  10/06/18 
 2020  10/06/18 
 1646   10/06/18 
 6114   10/06/18 
 6267  10/06/18 
 0398 NA  171*  170*  167*   < >  157*   --   146*  137  
K  4.1  3.6  3.6   < >  3.0*   --   3.8  4.6 CL  138*  137*  136*   < >  122*   --   112*  97  
CO2  26  26  23   < >  21   --   12*  14* GLU  189*  177*  398*   < >  1133*   < >  >1500*  >1500* BUN  73*  73*  76*   < >  76*   --   76*  82* CREA  5.79*  5.37*  5.21*   < >  5.94*   --   5.44*  6.31* CA  8.9  8.9  8.7   < >  8.3*   --   6.5*  9.1 MG  3.6*  4.0*  4.1*   --   4.2*   --   3.3*  4.3*  
PHOS  1.2*   --    --    --   1.2*   --   4.6   --   
ALB   --    --    --    --    --    --    --   3.4* TBILI   --    --    --    --    --    --    --   0.6 SGOT   --    --    --    --    --    --    --   10* ALT   --    --    --    --    --    --    --   26  
 < > = values in this interval not displayed.   
 
 
Signed: Debbie Melissa MD

## 2018-10-08 LAB
ADMINISTERED INITIALS, ADMINIT: NORMAL
ANION GAP SERPL CALC-SCNC: 10 MMOL/L (ref 5–15)
ANION GAP SERPL CALC-SCNC: 12 MMOL/L (ref 5–15)
ANION GAP SERPL CALC-SCNC: 7 MMOL/L (ref 5–15)
BASOPHILS # BLD: 0 K/UL (ref 0–0.1)
BASOPHILS NFR BLD: 0 % (ref 0–1)
BUN SERPL-MCNC: 68 MG/DL (ref 6–20)
BUN SERPL-MCNC: 68 MG/DL (ref 6–20)
BUN SERPL-MCNC: 73 MG/DL (ref 6–20)
BUN/CREAT SERPL: 11 (ref 12–20)
BUN/CREAT SERPL: 12 (ref 12–20)
BUN/CREAT SERPL: 13 (ref 12–20)
CALCIUM SERPL-MCNC: 7.9 MG/DL (ref 8.5–10.1)
CALCIUM SERPL-MCNC: 8.2 MG/DL (ref 8.5–10.1)
CALCIUM SERPL-MCNC: 8.9 MG/DL (ref 8.5–10.1)
CHLORIDE SERPL-SCNC: 125 MMOL/L (ref 97–108)
CHLORIDE SERPL-SCNC: 136 MMOL/L (ref 97–108)
CHLORIDE SERPL-SCNC: 138 MMOL/L (ref 97–108)
CK SERPL-CCNC: ABNORMAL U/L (ref 39–308)
CK SERPL-CCNC: ABNORMAL U/L (ref 39–308)
CO2 SERPL-SCNC: 21 MMOL/L (ref 21–32)
CO2 SERPL-SCNC: 22 MMOL/L (ref 21–32)
CO2 SERPL-SCNC: 26 MMOL/L (ref 21–32)
COMMENT, HOLDF: NORMAL
CREAT SERPL-MCNC: 5.79 MG/DL (ref 0.7–1.3)
CREAT SERPL-MCNC: 5.84 MG/DL (ref 0.7–1.3)
CREAT SERPL-MCNC: 6.12 MG/DL (ref 0.7–1.3)
D50 ADMINISTERED, D50ADM: 0 ML
D50 ORDER, D50ORD: 0 ML
DIFFERENTIAL METHOD BLD: ABNORMAL
EOSINOPHIL # BLD: 0.1 K/UL (ref 0–0.4)
EOSINOPHIL NFR BLD: 1 % (ref 0–7)
ERYTHROCYTE [DISTWIDTH] IN BLOOD BY AUTOMATED COUNT: 14.6 % (ref 11.5–14.5)
GLSCOM COMMENTS: NORMAL
GLUCOSE BLD STRIP.AUTO-MCNC: 163 MG/DL (ref 65–100)
GLUCOSE BLD STRIP.AUTO-MCNC: 169 MG/DL (ref 65–100)
GLUCOSE BLD STRIP.AUTO-MCNC: 190 MG/DL (ref 65–100)
GLUCOSE BLD STRIP.AUTO-MCNC: 226 MG/DL (ref 65–100)
GLUCOSE BLD STRIP.AUTO-MCNC: 295 MG/DL (ref 65–100)
GLUCOSE BLD STRIP.AUTO-MCNC: 366 MG/DL (ref 65–100)
GLUCOSE BLD STRIP.AUTO-MCNC: 369 MG/DL (ref 65–100)
GLUCOSE BLD STRIP.AUTO-MCNC: 423 MG/DL (ref 65–100)
GLUCOSE SERPL-MCNC: 189 MG/DL (ref 65–100)
GLUCOSE SERPL-MCNC: 206 MG/DL (ref 65–100)
GLUCOSE SERPL-MCNC: 447 MG/DL (ref 65–100)
GLUCOSE, GLC: 163 MG/DL
GLUCOSE, GLC: 169 MG/DL
GLUCOSE, GLC: 190 MG/DL
GLUCOSE, GLC: 226 MG/DL
GLUCOSE, GLC: 295 MG/DL
GLUCOSE, GLC: 366 MG/DL
GLUCOSE, GLC: 423 MG/DL
HCT VFR BLD AUTO: 41.6 % (ref 36.6–50.3)
HGB BLD-MCNC: 14 G/DL (ref 12.1–17)
HIGH TARGET, HITG: 250 MG/DL
IMM GRANULOCYTES # BLD: 0.1 K/UL (ref 0–0.04)
IMM GRANULOCYTES NFR BLD AUTO: 1 % (ref 0–0.5)
INSULIN ADMINSTERED, INSADM: 12.5 UNITS/HOUR
INSULIN ADMINSTERED, INSADM: 18.5 UNITS/HOUR
INSULIN ADMINSTERED, INSADM: 3.2 UNITS/HOUR
INSULIN ADMINSTERED, INSADM: 3.4 UNITS/HOUR
INSULIN ADMINSTERED, INSADM: 4 UNITS/HOUR
INSULIN ADMINSTERED, INSADM: 5.1 UNITS/HOUR
INSULIN ADMINSTERED, INSADM: 7.3 UNITS/HOUR
INSULIN ORDER, INSORD: 12.5 UNITS/HOUR
INSULIN ORDER, INSORD: 18.5 UNITS/HOUR
INSULIN ORDER, INSORD: 3.2 UNITS/HOUR
INSULIN ORDER, INSORD: 3.4 UNITS/HOUR
INSULIN ORDER, INSORD: 4 UNITS/HOUR
INSULIN ORDER, INSORD: 5.1 UNITS/HOUR
INSULIN ORDER, INSORD: 7.3 UNITS/HOUR
LIPASE SERPL-CCNC: 309 U/L (ref 73–393)
LOW TARGET, LOT: 150 MG/DL
LYMPHOCYTES # BLD: 1.6 K/UL (ref 0.8–3.5)
LYMPHOCYTES NFR BLD: 13 % (ref 12–49)
MAGNESIUM SERPL-MCNC: 2.7 MG/DL (ref 1.6–2.4)
MCH RBC QN AUTO: 27.7 PG (ref 26–34)
MCHC RBC AUTO-ENTMCNC: 33.7 G/DL (ref 30–36.5)
MCV RBC AUTO: 82.4 FL (ref 80–99)
MINUTES UNTIL NEXT BG, NBG: 120 MIN
MINUTES UNTIL NEXT BG, NBG: 60 MIN
MONOCYTES # BLD: 0.9 K/UL (ref 0–1)
MONOCYTES NFR BLD: 7 % (ref 5–13)
MULTIPLIER, MUL: 0.03
MULTIPLIER, MUL: 0.04
MULTIPLIER, MUL: 0.05
NEUTS SEG # BLD: 10 K/UL (ref 1.8–8)
NEUTS SEG NFR BLD: 79 % (ref 32–75)
NRBC # BLD: 0 K/UL (ref 0–0.01)
NRBC BLD-RTO: 0 PER 100 WBC
ORDER INITIALS, ORDINIT: NORMAL
PHOSPHATE SERPL-MCNC: 4.1 MG/DL (ref 2.6–4.7)
PLATELET # BLD AUTO: 115 K/UL (ref 150–400)
PMV BLD AUTO: 11.8 FL (ref 8.9–12.9)
POTASSIUM SERPL-SCNC: 4.1 MMOL/L (ref 3.5–5.1)
POTASSIUM SERPL-SCNC: 4.1 MMOL/L (ref 3.5–5.1)
POTASSIUM SERPL-SCNC: 4.8 MMOL/L (ref 3.5–5.1)
RBC # BLD AUTO: 5.05 M/UL (ref 4.1–5.7)
SAMPLES BEING HELD,HOLD: NORMAL
SERVICE CMNT-IMP: ABNORMAL
SODIUM SERPL-SCNC: 157 MMOL/L (ref 136–145)
SODIUM SERPL-SCNC: 169 MMOL/L (ref 136–145)
SODIUM SERPL-SCNC: 171 MMOL/L (ref 136–145)
WBC # BLD AUTO: 12.7 K/UL (ref 4.1–11.1)

## 2018-10-08 PROCEDURE — 74011250636 HC RX REV CODE- 250/636: Performed by: INTERNAL MEDICINE

## 2018-10-08 PROCEDURE — 82550 ASSAY OF CK (CPK): CPT | Performed by: INTERNAL MEDICINE

## 2018-10-08 PROCEDURE — 74011000258 HC RX REV CODE- 258: Performed by: INTERNAL MEDICINE

## 2018-10-08 PROCEDURE — 85025 COMPLETE CBC W/AUTO DIFF WBC: CPT | Performed by: INTERNAL MEDICINE

## 2018-10-08 PROCEDURE — 84100 ASSAY OF PHOSPHORUS: CPT | Performed by: INTERNAL MEDICINE

## 2018-10-08 PROCEDURE — 80048 BASIC METABOLIC PNL TOTAL CA: CPT | Performed by: INTERNAL MEDICINE

## 2018-10-08 PROCEDURE — 74011250636 HC RX REV CODE- 250/636: Performed by: GENERAL ACUTE CARE HOSPITAL

## 2018-10-08 PROCEDURE — 74011636637 HC RX REV CODE- 636/637: Performed by: INTERNAL MEDICINE

## 2018-10-08 PROCEDURE — 83735 ASSAY OF MAGNESIUM: CPT | Performed by: INTERNAL MEDICINE

## 2018-10-08 PROCEDURE — 83690 ASSAY OF LIPASE: CPT | Performed by: INTERNAL MEDICINE

## 2018-10-08 PROCEDURE — 82962 GLUCOSE BLOOD TEST: CPT

## 2018-10-08 PROCEDURE — 74011000250 HC RX REV CODE- 250: Performed by: GENERAL ACUTE CARE HOSPITAL

## 2018-10-08 PROCEDURE — 74011250637 HC RX REV CODE- 250/637: Performed by: GENERAL ACUTE CARE HOSPITAL

## 2018-10-08 PROCEDURE — 36415 COLL VENOUS BLD VENIPUNCTURE: CPT | Performed by: INTERNAL MEDICINE

## 2018-10-08 PROCEDURE — 65610000006 HC RM INTENSIVE CARE

## 2018-10-08 RX ORDER — SODIUM CHLORIDE 9 MG/ML
200 INJECTION, SOLUTION INTRAVENOUS CONTINUOUS
Status: DISCONTINUED | OUTPATIENT
Start: 2018-10-08 | End: 2018-10-09

## 2018-10-08 RX ORDER — INSULIN GLARGINE 100 [IU]/ML
15 INJECTION, SOLUTION SUBCUTANEOUS DAILY
Status: DISCONTINUED | OUTPATIENT
Start: 2018-10-09 | End: 2018-10-09

## 2018-10-08 RX ORDER — INSULIN LISPRO 100 [IU]/ML
INJECTION, SOLUTION INTRAVENOUS; SUBCUTANEOUS
Status: DISCONTINUED | OUTPATIENT
Start: 2018-10-08 | End: 2018-10-12 | Stop reason: HOSPADM

## 2018-10-08 RX ORDER — SODIUM CHLORIDE 450 MG/100ML
300 INJECTION, SOLUTION INTRAVENOUS CONTINUOUS
Status: DISCONTINUED | OUTPATIENT
Start: 2018-10-08 | End: 2018-10-08

## 2018-10-08 RX ORDER — INSULIN GLARGINE 100 [IU]/ML
15 INJECTION, SOLUTION SUBCUTANEOUS
Status: COMPLETED | OUTPATIENT
Start: 2018-10-08 | End: 2018-10-08

## 2018-10-08 RX ORDER — INSULIN LISPRO 100 [IU]/ML
7 INJECTION, SOLUTION INTRAVENOUS; SUBCUTANEOUS
Status: COMPLETED | OUTPATIENT
Start: 2018-10-08 | End: 2018-10-08

## 2018-10-08 RX ADMIN — Medication 10 ML: at 06:27

## 2018-10-08 RX ADMIN — SODIUM CHLORIDE 300 ML/HR: 450 INJECTION, SOLUTION INTRAVENOUS at 15:40

## 2018-10-08 RX ADMIN — SODIUM CHLORIDE 7.3 UNITS/HR: 900 INJECTION, SOLUTION INTRAVENOUS at 10:27

## 2018-10-08 RX ADMIN — MUPIROCIN: 20 OINTMENT TOPICAL at 09:10

## 2018-10-08 RX ADMIN — DEXTROSE MONOHYDRATE 125 ML/HR: 5 INJECTION, SOLUTION INTRAVENOUS at 09:30

## 2018-10-08 RX ADMIN — SODIUM CHLORIDE 18.5 UNITS/HR: 900 INJECTION, SOLUTION INTRAVENOUS at 12:26

## 2018-10-08 RX ADMIN — INSULIN GLARGINE 15 UNITS: 100 INJECTION, SOLUTION SUBCUTANEOUS at 12:18

## 2018-10-08 RX ADMIN — SODIUM CHLORIDE 200 ML/HR: 900 INJECTION, SOLUTION INTRAVENOUS at 17:15

## 2018-10-08 RX ADMIN — SODIUM CHLORIDE 12.5 UNITS/HR: 900 INJECTION, SOLUTION INTRAVENOUS at 11:28

## 2018-10-08 RX ADMIN — HEPARIN SODIUM 5000 UNITS: 5000 INJECTION INTRAVENOUS; SUBCUTANEOUS at 09:09

## 2018-10-08 RX ADMIN — SODIUM CHLORIDE 3.4 UNITS/HR: 900 INJECTION, SOLUTION INTRAVENOUS at 06:15

## 2018-10-08 RX ADMIN — SODIUM CHLORIDE 3.2 UNITS/HR: 900 INJECTION, SOLUTION INTRAVENOUS at 04:17

## 2018-10-08 RX ADMIN — Medication 10 ML: at 16:24

## 2018-10-08 RX ADMIN — HEPARIN SODIUM 5000 UNITS: 5000 INJECTION INTRAVENOUS; SUBCUTANEOUS at 01:55

## 2018-10-08 RX ADMIN — FAMOTIDINE 20 MG: 10 INJECTION, SOLUTION INTRAVENOUS at 09:11

## 2018-10-08 RX ADMIN — INSULIN LISPRO 7 UNITS: 100 INJECTION, SOLUTION INTRAVENOUS; SUBCUTANEOUS at 16:55

## 2018-10-08 RX ADMIN — MUPIROCIN: 20 OINTMENT TOPICAL at 17:25

## 2018-10-08 RX ADMIN — SODIUM CHLORIDE 300 ML/HR: 450 INJECTION, SOLUTION INTRAVENOUS at 12:06

## 2018-10-08 RX ADMIN — Medication 10 ML: at 22:23

## 2018-10-08 RX ADMIN — DEXTROSE MONOHYDRATE 125 ML/HR: 5 INJECTION, SOLUTION INTRAVENOUS at 01:53

## 2018-10-08 RX ADMIN — HEPARIN SODIUM 5000 UNITS: 5000 INJECTION INTRAVENOUS; SUBCUTANEOUS at 16:23

## 2018-10-08 RX ADMIN — SODIUM CHLORIDE 4 UNITS/HR: 900 INJECTION, SOLUTION INTRAVENOUS at 01:52

## 2018-10-08 NOTE — PROGRESS NOTES
Critical care interdisciplinary rounds held on 10/08/2018. Following members present, Pharmacy, Diabetes Treatment, Case Management, Respiratory Therapy, Physical Therapy, Clinical Care Lead and Nutrition. Led by VIVI Cantu RN and Dr. Lorraine Davis and Dr. Ron Webb. Plan of care discussed. See clinical pathway for plan of care and interventions and desired outcomes.

## 2018-10-08 NOTE — PROGRESS NOTES
Problem: Falls - Risk of 
Goal: *Absence of Falls Document Belen Mckeon Fall Risk and appropriate interventions in the flowsheet. In Progress. Problem: Patient Education: Go to Patient Education Activity Goal: Patient/Family Education In progress. Pt remains confused, girl friend educated. Problem: Diabetes Self-Management Goal: *Disease process and treatment process Define diabetes and identify own type of diabetes; list 3 options for treating diabetes. In progress. Pt remains confused, girl friend educated. Goal: *Incorporating nutritional management into lifestyle Describe effect of type, amount and timing of food on blood glucose; list 3 methods for planning meals. In progress. Pt remains confused, girl friend educated. Goal: *Incorporating physical activity into lifestyle State effect of exercise on blood glucose levels. In progress. Pt remains confused, girl friend educated. Goal: *Developing strategies to promote health/change behavior Define the ABC's of diabetes; identify appropriate screenings, schedule and personal plan for screenings. In progress. Pt remains confused, girl friend educated. Goal: *Using medications safely State effect of diabetes medications on diabetes; name diabetes medication taking, action and side effects. In progress. Pt remains confused, girl friend educated. Goal: *Monitoring blood glucose, interpreting and using results Identify recommended blood glucose targets  and personal targets. In progress. Pt remains confused, girl friend educated. Goal: *Prevention, detection, treatment of acute complications List symptoms of hyper- and hypoglycemia; describe how to treat low blood sugar and actions for lowering  high blood glucose level. In progress. Pt remains confused, girl friend educated. Goal: *Prevention, detection and treatment of chronic complications Define the natural course of diabetes and describe the relationship of blood glucose levels to long term complications of diabetes. In progress. Pt remains confused, girl friend educated. Goal: *Developing strategies to address psychosocial issues Describe feelings about living with diabetes; identify support needed and support network In progress. Pt remains confused, girl friend educated. Problem: Pressure Injury - Risk of 
Goal: *Prevention of pressure injury Document Alan Scale and appropriate interventions in the flowsheet. In Progress.

## 2018-10-08 NOTE — PROGRESS NOTES
10/8/2018 INTENSIVIST PROGRESS NOTE:  
 
Patient seen and evaluated, chart reviewed. Patient admitted with AMS. Found to have DKA. Severely hypernatremic. Altered severely on admission. Much more alert today. Denies complaint. Visit Vitals  /87  Pulse (!) 112  Temp 97.6 °F (36.4 °C)  Resp 18  Ht 6' 2\" (1.88 m)  Wt 117.9 kg (260 lb)  SpO2 100%  BMI 33.38 kg/m2 General:  No acute distress Eyes: anicteric HEENT: NC, scalp staples in place CV:  Tachy, regular Lungs: CTA B, no wheeze Abd: soft, +BS, no focal tenderness Ext: no cyanosis, no clubbing Skin: warm and dry Musculoskeletal: no gross deformities Neuro: alert, oriented Labs reviewed A/P: 
- DKA/HHS - insulin, IVF, replete electrolytes per protocol - will keep on insulin drip for now because he is still on D5 for his sodium 
- severe hypernatremia - change 1/2 NS to 1/4 NS until nephrology has a chance to re-evaluate - pancreatitis by enzymes only 
- acute renal failure - IV fluids, nephrology following 
- rhabdomyolysis - IV fluids 
- encephalopathy 
- advance diet 
- DVT prophylaxis Cristopher Beckham MD

## 2018-10-08 NOTE — PROGRESS NOTES
Hospitalist Progress Note NAME: Kedar Welsh  
:  1970 MRN:  [id number] Assessment / Plan: AMS secondary to DKA/HHS Start patient on IV fluid Start patient on bicarb Start patient on insulin drip Admit to the ICU  
 
10/7: 
Keep NPO, continue IVF - now on D5W, continue Insulin drip Pt remains critically - keep in ICU 
  
10/8: 
Continue Insulin drip - fluids changed by Nephrology to D5W 1/2 NS. Hypernatremia Hypokalemia Hypophosphatemia Acute renal failure secondary to dehydration and rhabdomyolysis Start patient on IV fluid Follow-up repeat BMP 
-Nephrology consult 10/7: 
Renal function worsening. CK elevated. Continue to monitor. Continue IV. Nephro eval noted. Replete electrolytes. Still having reasonable UOP. Continue to monitor I/Os strictly. 10/8: 
Cr continues to trend up - still having UOP. Kidney US negative. Continue IVF Replete electrolytes. Na trending down but still elevated. CK improving as well. Appreciate Nephro follow up. Leukocytosis most likely reactive Follow blood culture Follow-up her calcitonin Continue IV antibiotic empiric therapy 
   
  
30.0 - 39.9 Obese / Body mass index is 33.38 kg/(m^2). 
  
Code status: Full Prophylaxis: Heparin Recommended Disposition: TBD Subjective: Chief Complaint / Reason for Physician Visit Still confused. Attempts to answer questions today but again answers \"yeah\" for a majority of them. Does not recall the events from yesterday. Does not know how long he has been in the hospital.  Discussed with RN events overnight. Review of Systems: 
Symptom Y/N Comments  Symptom Y/N Comments Fever/Chills    Chest Pain Poor Appetite    Edema Cough    Abdominal Pain Sputum    Joint Pain SOB/HERNANDEZ    Pruritis/Rash Nausea/vomit    Tolerating PT/OT Diarrhea    Tolerating Diet Constipation    Other Could NOT obtain due to: AMS Objective: VITALS:  
 Last 24hrs VS reviewed since prior progress note. Most recent are: 
Patient Vitals for the past 24 hrs: 
 Temp Pulse Resp BP SpO2  
10/08/18 1000 - (!) 118 25 120/82 -  
10/08/18 0900 - (!) 112 18 140/87 -  
10/08/18 0800 - (!) 112 16 (!) 132/94 (!) 80 % 10/08/18 0700 97.6 °F (36.4 °C) (!) 112 18 132/87 100 % 10/08/18 0600 - (!) 114 18 132/88 100 % 10/08/18 0500 - (!) 114 19 110/63 99 % 10/08/18 0400 98.6 °F (37 °C) (!) 118 18 108/83 100 % 10/08/18 0300 - (!) 114 17 125/82 -  
10/08/18 0200 - (!) 114 14 (!) 129/92 -  
10/08/18 0100 - (!) 110 17 (!) 129/92 -  
10/08/18 0001 98.9 °F (37.2 °C) (!) 113 21 (!) 138/91 100 % 10/07/18 2300 - (!) 114 15 135/82 -  
10/07/18 2200 - (!) 114 21 (!) 140/95 100 % 10/07/18 2100 - (!) 106 19 146/88 100 % 10/07/18 2000 98.3 °F (36.8 °C) (!) 116 12 (!) 145/92 99 % 10/07/18 1900 - (!) 115 18 147/90 97 % 10/07/18 1800 97.8 °F (36.6 °C) (!) 107 23 (!) 141/94 97 % 10/07/18 1700 - (!) 112 19 (!) 141/97 99 % 10/07/18 1600 - 100 20 (!) 142/91 100 % 10/07/18 1500 - (!) 105 20 (!) 132/94 98 % 10/07/18 1400 - (!) 103 21 (!) 133/93 97 % Intake/Output Summary (Last 24 hours) at 10/08/18 1304 Last data filed at 10/08/18 1206 Gross per 24 hour Intake          2385.99 ml Output             2125 ml Net           260.99 ml PHYSICAL EXAM: 
General: Alert, cooperative, NAD  
EENT:  EOMI. Anicteric sclerae. MMM Resp:  CTA bilaterally, no wheezing or rales. No accessory muscle use CV:  Increased rate,  No edema GI:  Soft, Non distended, Non tender.  +Bowel sounds Neurologic:  Alert and oriented X 1, mentation improved from yesterday but pt still confused Psych:   Poor insight Skin:  No rashes. No jaundice Reviewed most current lab test results and cultures  YES Reviewed most current radiology test results   YES Review and summation of old records today    NO Reviewed patient's current orders and MAR    YES 
 PMH/SH reviewed - no change compared to H&P 
________________________________________________________________________ Care Plan discussed with: 
  Comments Patient x Family RN x Care Manager Consultant Multidiciplinary team rounds were held today with , nursing, pharmacist and clinical coordinator. Patient's plan of care was discussed; medications were reviewed and discharge planning was addressed. ________________________________________________________________________ Total NON critical care TIME:  25   Minutes Total CRITICAL CARE TIME Spent:   Minutes non procedure based Comments >50% of visit spent in counseling and coordination of care    
________________________________________________________________________ Miguel MD Alma Delia  
 
Procedures: see electronic medical records for all procedures/Xrays and details which were not copied into this note but were reviewed prior to creation of Plan. LABS: 
I reviewed today's most current labs and imaging studies. Pertinent labs include: 
Recent Labs 10/08/18 
 0401  10/07/18 
 2283  10/06/18 
 9989 WBC  12.7*  17.7*  18.9* HGB  14.0  14.8  14.4 HCT  41.6  43.4  43.7 PLT  115*  163  221 Recent Labs 10/08/18 
 0401  10/07/18 
 1944  10/07/18 
 1002  10/07/18 
 0419  10/06/18 
 2020   10/06/18 
 6484   10/06/18 
 0505 NA  169*  170*  170*  171*  170*   < >  157*   < >  137  
K  4.1  4.1  4.2  4.1  3.6   < >  3.0*   < >  4.6 CL  136*  137*  138*  138*  137*   < >  122*   < >  97  
CO2  21  24  24  26  26   < >  21   < >  14* GLU  206*  231*  245*  189*  177*   < >  1133*   < >  >1500* BUN  68*  74*  74*  73*  73*   < >  76*   < >  82* CREA  6.12*  6.05*  6.00*  5.79*  5.37*   < >  5.94*   < >  6.31* CA  8.2*  8.1*  8.7  8.9  8.9   < >  8.3*   < >  9.1 MG  2.7*   --    --   3.6*  4.0*   < >  4.2*   < >  4.3*  
 PHOS  4.1   --    --   1.2*   --    --   1.2*   < >   --   
ALB   --    --    --    --    --    --    --    --   3.4* TBILI   --    --    --    --    --    --    --    --   0.6 SGOT   --    --    --    --    --    --    --    --   10* ALT   --    --    --    --    --    --    --    --   26  
 < > = values in this interval not displayed.   
 
 
Signed: Torres Breen MD

## 2018-10-08 NOTE — PROGRESS NOTES
Bedside and Verbal shift change report received from Lucina Barney. Report received  with SBAR, Kardex, ED Summary, OR Summary, Procedure Summary, Intake/Output, MAR, Accordion and Recent Results. Pt received alert to self only. Pt breaths well on room air. abd soft. feet warm pedal pulses present. Pt is on IV insulin, see Mar details. 0900 Mouth care done. Participated in the IDRs. 56 Female visitor at the bed side. 1110  at the bed side, talking to pt's girlfriend (whoi Is from [facility]). Pt remains confused. 1200  at the bed side. She changed IVF to .45ns @300cc/hr. She suggested to call intensivisit for insulin management. Spoke with , he ordered lantus for now. Will turn off drip post 1 hr lantus admin. Will continue to monitor the pt. ordered BMP at 4 pm and will call her with results. 1320 Inulin drip off per . 
 
1600 chemistry drawn sent for the processing. 1625 Bs of 369 reported to , see chart for the orders. 1700 following results called to , see order for IVF change. q8 bmp cancelled. Results for Suzanne Brown (MRN [id number]) as of 10/8/2018 17:26 Ref. Range 10/8/2018 16:02 Sodium Latest Ref Range: 136 - 145 mmol/L 157 (H) Potassium Latest Ref Range: 3.5 - 5.1 mmol/L 4.8 Chloride Latest Ref Range: 97 - 108 mmol/L 125 (H) CO2 Latest Ref Range: 21 - 32 mmol/L 22 Anion gap Latest Ref Range: 5 - 15 mmol/L 10 Glucose Latest Ref Range: 65 - 100 mg/dL 447 (H) BUN Latest Ref Range: 6 - 20 MG/DL 68 (H) Creatinine Latest Ref Range: 0.70 - 1.30 MG/DL 5.84 (H) BUN/Creatinine ratio Latest Ref Range: 12 - 20   12 Calcium Latest Ref Range: 8.5 - 10.1 MG/DL 7.9 (L) GFR est non-AA Latest Ref Range: >60 ml/min/1.73m2 10 (L) GFR est AA Latest Ref Range: >60 ml/min/1.73m2 13 (L)  
 
1900 Bedside and Verbal shift change report given to Herberth Hitchcock (oncoming nurse) by helenp (offgoing nurse). Report given with SBAR, Kardex, ED Summary, OR Summary, Procedure Summary, Intake/Output, MAR, Accordion and Recent Results.

## 2018-10-08 NOTE — PROGRESS NOTES
NAME: Kole Davis  
     :  1970 MRN:  [id number] Assessment :    Plan: 
--PETER 
rhabdo DKA Severe hypernatremia AMS --Creatinine worse, now 6.1. Fair UO. NML renal U/S. CK increasing, now 84708. Peak cpk usually by 72 hours. Sodium about the same (169). Change to 1/2 NS at 300 ml and I encouraged PO fluids. Has a blakely (can technically come out, but I am hoping for > 300 ml uop an hour for the rest of the day). Check BMP at 4 pm. 
 
  
 
 
Subjective: Chief Complaint:  Alert. Seems like less confused; asking if we have a gym he could work out in, so still not quite oriented to situation. I spoke with he and his girlfriend. I spoke with his ICU nurse (she will let ICU doc know that I stopped D5 ivf's). Review of Systems: 
 
Symptom Y/N Comments  Symptom Y/N Comments Fever/Chills    Chest Pain n   
Poor Appetite n   Edema n   
Cough    Abdominal Pain n   
Sputum    Joint Pain SOB/HERNANDEZ n   Pruritis/Rash Nausea/vomit n   Tolerating PT/OT Diarrhea    Tolerating Diet Constipation    Other Could not obtain due to:   
 
Objective: VITALS:  
Last 24hrs VS reviewed since prior progress note. Most recent are: 
Visit Vitals  /88  Pulse (!) 114  Temp 98.6 °F (37 °C)  Resp 18  Ht 6' 2\" (1.88 m)  Wt 117.9 kg (260 lb)  SpO2 100%  BMI 33.38 kg/m2 Intake/Output Summary (Last 24 hours) at 10/08/18 [facility] Last data filed at 10/08/18 0600 Gross per 24 hour Intake          3665.67 ml Output             1820 ml Net          1845.67 ml Telemetry Reviewed: PHYSICAL EXAM: 
General: NAD 
CTA 
RRR 
abd soft No edema Lab Data Reviewed: (see below) Medications Reviewed: (see below) PMH/SH reviewed - no change compared to H&P 
________________________________________________________________________ Care Plan discussed with: 
Patient y Family RN y   
Care Manager Consultant:     
 
  Comments >50% of visit spent in counseling and coordination of care    
 
________________________________________________________________________ Dary Ballard MD  
 
Procedures: see electronic medical records for all procedures/Xrays and details which 
were not copied into this note but were reviewed prior to creation of Plan. LABS: 
Recent Labs 10/08/18 
 0401  10/07/18 
 9058 WBC  12.7*  17.7* HGB  14.0  14.8 HCT  41.6  43.4 PLT  115*  163 Recent Labs 10/08/18 
 0401  10/07/18 
 1944  10/07/18 
 1002  10/07/18 
 0419  10/06/18 
 2020   10/06/18 
 6984 NA  169*  170*  170*  171*  170*   < >  157* K  4.1  4.1  4.2  4.1  3.6   < >  3.0*  
CL  136*  137*  138*  138*  137*   < >  122* CO2  21  24  24  26  26   < >  21 BUN  68*  74*  74*  73*  73*   < >  76* CREA  6.12*  6.05*  6.00*  5.79*  5.37*   < >  5.94* GLU  206*  231*  245*  189*  177*   < >  1133* CA  8.2*  8.1*  8.7  8.9  8.9   < >  8.3*  
MG  2.7*   --    --   3.6*  4.0*   < >  4.2*  
PHOS  4.1   --    --   1.2*   --    --   1.2*  
 < > = values in this interval not displayed. Recent Labs 10/08/18 
 0401  10/07/18 
 1002  10/06/18 
 9922  10/06/18 
 1203 SGOT   --    --    --   10* AP   --    --    --   178* TP   --    --    --   8.7* ALB   --    --    --   3.4*  
GLOB   --    --    --   5.3*  
LPSE  309  114  889*   -- No results for input(s): INR, PTP, APTT in the last 72 hours. No lab exists for component: INREXT, INREXT No results for input(s): FE, TIBC, PSAT, FERR in the last 72 hours. No results found for: FOL, RBCF Recent Labs 10/06/18 
 2987 PH  7.25* PCO2  30* PO2  83 Recent Labs 10/08/18 
 0401  10/07/18 
 5633  10/06/18 
 6189 CPK  76580*  98170*  414* CKMB   --    --   2.1 No components found for: [place] Lab Results Component Value Date/Time Color YELLOW/STRAW 10/06/2018 05:12 AM  
 Appearance CLEAR 10/06/2018 05:12 AM  
 Specific gravity 1.028 10/06/2018 05:12 AM  
 pH (UA) 5.0 10/06/2018 05:12 AM  
 Protein 30 (A) 10/06/2018 05:12 AM  
 Glucose >1000 (A) 10/06/2018 05:12 AM  
 Ketone 15 (A) 10/06/2018 05:12 AM  
 Bilirubin NEGATIVE  10/06/2018 05:12 AM  
 Urobilinogen 0.2 10/06/2018 05:12 AM  
 Nitrites NEGATIVE  10/06/2018 05:12 AM  
 Leukocyte Esterase NEGATIVE  10/06/2018 05:12 AM  
 Epithelial cells FEW 10/06/2018 05:12 AM  
 Bacteria NEGATIVE  10/06/2018 05:12 AM  
 WBC 0-4 10/06/2018 05:12 AM  
 RBC 0-5 10/06/2018 05:12 AM  
 
 
MEDICATIONS: 
Current Facility-Administered Medications Medication Dose Route Frequency  dextrose 5% infusion  125 mL/hr IntraVENous CONTINUOUS  
 sodium chloride (NS) flush 5-10 mL  5-10 mL IntraVENous PRN  
 sodium chloride (NS) flush 5-10 mL  5-10 mL IntraVENous Q8H  
 sodium chloride (NS) flush 5-10 mL  5-10 mL IntraVENous PRN  
 heparin (porcine) injection 5,000 Units  5,000 Units SubCUTAneous Q8H  
 mupirocin (BACTROBAN) 2 % ointment   Both Nostrils BID  insulin regular (NOVOLIN R, HUMULIN R) 100 Units in 0.9% sodium chloride 100 mL infusion  0-50 Units/hr IntraVENous TITRATE  insulin lispro (HUMALOG) injection   SubCUTAneous TIDAC  glucose chewable tablet 16 g  4 Tab Oral PRN  
 dextrose (D50W) injection syrg 12.5-25 g  25-50 mL IntraVENous PRN  
 glucagon (GLUCAGEN) injection 1 mg  1 mg IntraMUSCular PRN  
 famotidine (PF) (PEPCID) 20 mg in sodium chloride 0.9% 10 mL injection  20 mg IntraVENous Q24H

## 2018-10-08 NOTE — PROGRESS NOTES
Care Management: 
 
Reason for Admission:   Severe DKA and admitted to ICU with insulin Gtt. Hospitalist, nephrology and Pulmonology following. Girlfriend is Mik Dejesus. RRAT Score:   1 Plan for utilizing home health:   undetermined Likelihood of Readmission: low Transition of Care Plan:     Anticipate home with assist of family and friends and follow up with MD. May need [facility] for monitoring and instruction. We will cont to follow. Patient has no PCP and I have given them a list of PCP's in the area. Care Management Interventions PCP Verified by CM: No 
Current Support Network:  (Lives with friends. Independent with ADL's including working and driving. ) Confirm Follow Up Transport: Family Plan discussed with Pt/Family/Caregiver: Yes (Met with patient and his girlfriend [place]. ) Skylar Rosales [facility] 2435

## 2018-10-08 NOTE — DIABETES MGMT
DTC Progress Note Recommendations/ Comments: Pt discussed with rounding team and Dr. Andres Mike. Plan to continue insulin gtt at this time. Insulin needs more stable over last 24hrs, however, pt is still requiring D5 IVF secondary to elevated Na. Pt remains confused, but AMS improving. DTC will f/u with pt for new dx education when pt appropriate. Chart reviewed on Yoli Gutierrez during Multidisciplinary Rounds. A1c:  
Lab Results Component Value Date/Time Hemoglobin A1c 13.1 (H) 10/06/2018 08:44 AM  
 
 
 
 
Recent Glucose Results:  
Lab Results Component Value Date/Time  (H) 10/08/2018 04:01 AM  
  (H) 10/07/2018 07:44 PM  
 GLUCPOC 226 (H) 10/08/2018 08:22 AM  
 GLUCPOC 169 (H) 10/08/2018 06:14 AM  
 GLUCPOC 163 (H) 10/08/2018 04:00 AM  
  
 
Lab Results Component Value Date/Time Creatinine 6.12 (H) 10/08/2018 04:01 AM  
 
Estimated Creatinine Clearance: 20.1 mL/min (based on Cr of 6.12). Active Orders Diet DIET DIABETIC CONSISTENT CARB Regular PO intake: No data found. Will continue to follow as needed. Thank you. Glen Deng, BSN, RN, CDE [facility] Quality 431: Preventive Care And Screening: Unhealthy Alcohol Use - Screening: Patient screened for unhealthy alcohol use using a single question and scores 2 or greater episodes per year and brief intervention occurred Detail Level: Detailed

## 2018-10-08 NOTE — PROGRESS NOTES
Renal - 
 
Great response to 1/2 NS at increased rate (sodium down a bit too briskly). Will switch to NS at 200. Check BMP at 4 am. F/u cpk tomorrow am.  
 
Haley Dates

## 2018-10-09 LAB
ADMINISTERED INITIALS, ADMINIT: NORMAL
ALBUMIN SERPL-MCNC: 2.3 G/DL (ref 3.5–5)
ALBUMIN/GLOB SERPL: 0.6 {RATIO} (ref 1.1–2.2)
ALP SERPL-CCNC: 102 U/L (ref 45–117)
ALT SERPL-CCNC: 135 U/L (ref 12–78)
ANION GAP SERPL CALC-SCNC: 9 MMOL/L (ref 5–15)
ANION GAP SERPL CALC-SCNC: 9 MMOL/L (ref 5–15)
AST SERPL-CCNC: 161 U/L (ref 15–37)
BASOPHILS # BLD: 0 K/UL (ref 0–0.1)
BASOPHILS NFR BLD: 0 % (ref 0–1)
BILIRUB SERPL-MCNC: 0.4 MG/DL (ref 0.2–1)
BUN SERPL-MCNC: 65 MG/DL (ref 6–20)
BUN SERPL-MCNC: 78 MG/DL (ref 6–20)
BUN/CREAT SERPL: 13 (ref 12–20)
BUN/CREAT SERPL: 13 (ref 12–20)
CALCIUM SERPL-MCNC: 7.6 MG/DL (ref 8.5–10.1)
CALCIUM SERPL-MCNC: 8 MG/DL (ref 8.5–10.1)
CHLORIDE SERPL-SCNC: 125 MMOL/L (ref 97–108)
CHLORIDE SERPL-SCNC: 129 MMOL/L (ref 97–108)
CK SERPL-CCNC: 7417 U/L (ref 39–308)
CO2 SERPL-SCNC: 23 MMOL/L (ref 21–32)
CO2 SERPL-SCNC: 23 MMOL/L (ref 21–32)
COMMENT, HOLDF: NORMAL
CREAT SERPL-MCNC: 4.85 MG/DL (ref 0.7–1.3)
CREAT SERPL-MCNC: 5.79 MG/DL (ref 0.7–1.3)
D50 ADMINISTERED, D50ADM: 0 ML
D50 ORDER, D50ORD: 0 ML
DIFFERENTIAL METHOD BLD: ABNORMAL
EOSINOPHIL # BLD: 0.2 K/UL (ref 0–0.4)
EOSINOPHIL NFR BLD: 2 % (ref 0–7)
ERYTHROCYTE [DISTWIDTH] IN BLOOD BY AUTOMATED COUNT: 14.6 % (ref 11.5–14.5)
GLOBULIN SER CALC-MCNC: 3.9 G/DL (ref 2–4)
GLSCOM COMMENTS: NORMAL
GLUCOSE BLD STRIP.AUTO-MCNC: 117 MG/DL (ref 65–100)
GLUCOSE BLD STRIP.AUTO-MCNC: 126 MG/DL (ref 65–100)
GLUCOSE BLD STRIP.AUTO-MCNC: 139 MG/DL (ref 65–100)
GLUCOSE BLD STRIP.AUTO-MCNC: 154 MG/DL (ref 65–100)
GLUCOSE BLD STRIP.AUTO-MCNC: 260 MG/DL (ref 65–100)
GLUCOSE BLD STRIP.AUTO-MCNC: 285 MG/DL (ref 65–100)
GLUCOSE BLD STRIP.AUTO-MCNC: 373 MG/DL (ref 65–100)
GLUCOSE BLD STRIP.AUTO-MCNC: 421 MG/DL (ref 65–100)
GLUCOSE BLD STRIP.AUTO-MCNC: 449 MG/DL (ref 65–100)
GLUCOSE BLD STRIP.AUTO-MCNC: 519 MG/DL (ref 65–100)
GLUCOSE BLD STRIP.AUTO-MCNC: 551 MG/DL (ref 65–100)
GLUCOSE BLD STRIP.AUTO-MCNC: 591 MG/DL (ref 65–100)
GLUCOSE BLD STRIP.AUTO-MCNC: >600 MG/DL (ref 65–100)
GLUCOSE BLD STRIP.AUTO-MCNC: >600 MG/DL (ref 65–100)
GLUCOSE SERPL-MCNC: 253 MG/DL (ref 65–100)
GLUCOSE SERPL-MCNC: 400 MG/DL (ref 65–100)
GLUCOSE, GLC: 117 MG/DL
GLUCOSE, GLC: 126 MG/DL
GLUCOSE, GLC: 139 MG/DL
GLUCOSE, GLC: 154 MG/DL
GLUCOSE, GLC: 260 MG/DL
GLUCOSE, GLC: 285 MG/DL
GLUCOSE, GLC: 449 MG/DL
GLUCOSE, GLC: 519 MG/DL
GLUCOSE, GLC: 551 MG/DL
GLUCOSE, GLC: 591 MG/DL
HCT VFR BLD AUTO: 36.4 % (ref 36.6–50.3)
HGB BLD-MCNC: 12 G/DL (ref 12.1–17)
HIGH TARGET, HITG: 250 MG/DL
IMM GRANULOCYTES # BLD: 0.1 K/UL (ref 0–0.04)
IMM GRANULOCYTES NFR BLD AUTO: 1 % (ref 0–0.5)
INSULIN ADMINSTERED, INSADM: 18.2 UNITS/HOUR
INSULIN ADMINSTERED, INSADM: 18.2 UNITS/HOUR
INSULIN ADMINSTERED, INSADM: 27.1 UNITS/HOUR
INSULIN ADMINSTERED, INSADM: 3 UNITS/HOUR
INSULIN ADMINSTERED, INSADM: 3.3 UNITS/HOUR
INSULIN ADMINSTERED, INSADM: 30 UNITS/HOUR
INSULIN ADMINSTERED, INSADM: 31.5 UNITS/HOUR
INSULIN ADMINSTERED, INSADM: 32.6 UNITS/HOUR
INSULIN ADMINSTERED, INSADM: 5.5 UNITS/HOUR
INSULIN ADMINSTERED, INSADM: 5.8 UNITS/HOUR
INSULIN ORDER, INSORD: 18.2 UNITS/HOUR
INSULIN ORDER, INSORD: 18.2 UNITS/HOUR
INSULIN ORDER, INSORD: 27.1 UNITS/HOUR
INSULIN ORDER, INSORD: 3 UNITS/HOUR
INSULIN ORDER, INSORD: 3.3 UNITS/HOUR
INSULIN ORDER, INSORD: 30 UNITS/HOUR
INSULIN ORDER, INSORD: 31.5 UNITS/HOUR
INSULIN ORDER, INSORD: 32.6 UNITS/HOUR
INSULIN ORDER, INSORD: 5.5 UNITS/HOUR
INSULIN ORDER, INSORD: 5.8 UNITS/HOUR
LOW TARGET, LOT: 150 MG/DL
LYMPHOCYTES # BLD: 1.8 K/UL (ref 0.8–3.5)
LYMPHOCYTES NFR BLD: 17 % (ref 12–49)
MAGNESIUM SERPL-MCNC: 2.6 MG/DL (ref 1.6–2.4)
MCH RBC QN AUTO: 27.6 PG (ref 26–34)
MCHC RBC AUTO-ENTMCNC: 33 G/DL (ref 30–36.5)
MCV RBC AUTO: 83.7 FL (ref 80–99)
MINUTES UNTIL NEXT BG, NBG: 60 MIN
MONOCYTES # BLD: 0.7 K/UL (ref 0–1)
MONOCYTES NFR BLD: 7 % (ref 5–13)
MULTIPLIER, MUL: 0.05
MULTIPLIER, MUL: 0.05
MULTIPLIER, MUL: 0.06
MULTIPLIER, MUL: 0.07
MULTIPLIER, MUL: 0.07
MULTIPLIER, MUL: 0.08
MULTIPLIER, MUL: 0.08
MULTIPLIER, MUL: 0.09
NEUTS SEG # BLD: 7.8 K/UL (ref 1.8–8)
NEUTS SEG NFR BLD: 74 % (ref 32–75)
NRBC # BLD: 0 K/UL (ref 0–0.01)
NRBC BLD-RTO: 0 PER 100 WBC
ORDER INITIALS, ORDINIT: NORMAL
PHOSPHATE SERPL-MCNC: 5.7 MG/DL (ref 2.6–4.7)
PLATELET # BLD AUTO: 82 K/UL (ref 150–400)
PMV BLD AUTO: 12.3 FL (ref 8.9–12.9)
POTASSIUM SERPL-SCNC: 4.4 MMOL/L (ref 3.5–5.1)
POTASSIUM SERPL-SCNC: 4.5 MMOL/L (ref 3.5–5.1)
PROT SERPL-MCNC: 6.2 G/DL (ref 6.4–8.2)
RBC # BLD AUTO: 4.35 M/UL (ref 4.1–5.7)
SAMPLES BEING HELD,HOLD: NORMAL
SERVICE CMNT-IMP: ABNORMAL
SODIUM SERPL-SCNC: 157 MMOL/L (ref 136–145)
SODIUM SERPL-SCNC: 161 MMOL/L (ref 136–145)
WBC # BLD AUTO: 10.6 K/UL (ref 4.1–11.1)

## 2018-10-09 PROCEDURE — 74011636637 HC RX REV CODE- 636/637: Performed by: INTERNAL MEDICINE

## 2018-10-09 PROCEDURE — 82550 ASSAY OF CK (CPK): CPT | Performed by: INTERNAL MEDICINE

## 2018-10-09 PROCEDURE — 74011250636 HC RX REV CODE- 250/636: Performed by: GENERAL ACUTE CARE HOSPITAL

## 2018-10-09 PROCEDURE — 74011000258 HC RX REV CODE- 258: Performed by: INTERNAL MEDICINE

## 2018-10-09 PROCEDURE — 74011250637 HC RX REV CODE- 250/637: Performed by: GENERAL ACUTE CARE HOSPITAL

## 2018-10-09 PROCEDURE — 83735 ASSAY OF MAGNESIUM: CPT | Performed by: INTERNAL MEDICINE

## 2018-10-09 PROCEDURE — 65660000000 HC RM CCU STEPDOWN

## 2018-10-09 PROCEDURE — 36415 COLL VENOUS BLD VENIPUNCTURE: CPT | Performed by: INTERNAL MEDICINE

## 2018-10-09 PROCEDURE — 85025 COMPLETE CBC W/AUTO DIFF WBC: CPT | Performed by: INTERNAL MEDICINE

## 2018-10-09 PROCEDURE — 82962 GLUCOSE BLOOD TEST: CPT

## 2018-10-09 PROCEDURE — 74011250636 HC RX REV CODE- 250/636: Performed by: INTERNAL MEDICINE

## 2018-10-09 PROCEDURE — 74011000250 HC RX REV CODE- 250: Performed by: GENERAL ACUTE CARE HOSPITAL

## 2018-10-09 PROCEDURE — 80053 COMPREHEN METABOLIC PANEL: CPT | Performed by: INTERNAL MEDICINE

## 2018-10-09 PROCEDURE — 84100 ASSAY OF PHOSPHORUS: CPT | Performed by: INTERNAL MEDICINE

## 2018-10-09 RX ORDER — INSULIN GLARGINE 100 [IU]/ML
35 INJECTION, SOLUTION SUBCUTANEOUS DAILY
Status: DISCONTINUED | OUTPATIENT
Start: 2018-10-09 | End: 2018-10-10

## 2018-10-09 RX ORDER — SODIUM CHLORIDE 450 MG/100ML
225 INJECTION, SOLUTION INTRAVENOUS CONTINUOUS
Status: DISCONTINUED | OUTPATIENT
Start: 2018-10-09 | End: 2018-10-11

## 2018-10-09 RX ORDER — INSULIN LISPRO 100 [IU]/ML
10 INJECTION, SOLUTION INTRAVENOUS; SUBCUTANEOUS
Status: DISCONTINUED | OUTPATIENT
Start: 2018-10-09 | End: 2018-10-09

## 2018-10-09 RX ADMIN — SODIUM CHLORIDE 250 ML/HR: 450 INJECTION, SOLUTION INTRAVENOUS at 08:39

## 2018-10-09 RX ADMIN — MUPIROCIN: 20 OINTMENT TOPICAL at 09:09

## 2018-10-09 RX ADMIN — INSULIN LISPRO 10 UNITS: 100 INJECTION, SOLUTION INTRAVENOUS; SUBCUTANEOUS at 17:06

## 2018-10-09 RX ADMIN — HEPARIN SODIUM 5000 UNITS: 5000 INJECTION INTRAVENOUS; SUBCUTANEOUS at 01:19

## 2018-10-09 RX ADMIN — FAMOTIDINE 20 MG: 10 INJECTION, SOLUTION INTRAVENOUS at 08:41

## 2018-10-09 RX ADMIN — Medication 10 ML: at 05:45

## 2018-10-09 RX ADMIN — SODIUM CHLORIDE 32.6 UNITS/HR: 900 INJECTION, SOLUTION INTRAVENOUS at 03:17

## 2018-10-09 RX ADMIN — MUPIROCIN: 20 OINTMENT TOPICAL at 17:37

## 2018-10-09 RX ADMIN — Medication 10 ML: at 21:41

## 2018-10-09 RX ADMIN — SODIUM CHLORIDE 250 ML/HR: 450 INJECTION, SOLUTION INTRAVENOUS at 12:57

## 2018-10-09 RX ADMIN — INSULIN GLARGINE 35 UNITS: 100 INJECTION, SOLUTION SUBCUTANEOUS at 10:13

## 2018-10-09 RX ADMIN — SODIUM CHLORIDE 18.2 UNITS/HR: 900 INJECTION, SOLUTION INTRAVENOUS at 06:11

## 2018-10-09 RX ADMIN — SODIUM CHLORIDE 30 UNITS/HR: 900 INJECTION, SOLUTION INTRAVENOUS at 02:21

## 2018-10-09 RX ADMIN — INSULIN LISPRO 10 UNITS: 100 INJECTION, SOLUTION INTRAVENOUS; SUBCUTANEOUS at 17:05

## 2018-10-09 RX ADMIN — INSULIN LISPRO 10 UNITS: 100 INJECTION, SOLUTION INTRAVENOUS; SUBCUTANEOUS at 21:40

## 2018-10-09 RX ADMIN — SODIUM CHLORIDE 18.2 UNITS/HR: 900 INJECTION, SOLUTION INTRAVENOUS at 07:16

## 2018-10-09 NOTE — DIABETES MGMT
DTC Progress Note Recommendations/ Comments: Pt discussed with Dr. Shannon Riley. Plan to transition off insulin gtt to lantus 35 units daily, humalog 10 units ac tid and humalog correction normal sensitivity. Pt off insulin gtt yesterday after receiving 15 units of lantus and BG up to 500s by last night-- insulin gtt was resumed overnight. Chart reviewed on Tommy Aldana during Multidisciplinary Rounds. A1c:  
Lab Results Component Value Date/Time Hemoglobin A1c 13.1 (H) 10/06/2018 08:44 AM  
 
 
 
 
Recent Glucose Results:  
Lab Results Component Value Date/Time  (H) 10/09/2018 03:57 AM  
  (H) 10/08/2018 04:02 PM  
 GLUCPOC 260 (H) 10/09/2018 07:15 AM  
 GLUCPOC 285 (H) 10/09/2018 06:12 AM  
 GLUCPOC 449 (H) 10/09/2018 04:31 AM  
  
 
Lab Results Component Value Date/Time Creatinine 5.79 (H) 10/09/2018 03:57 AM  
 
Estimated Creatinine Clearance: 21.3 mL/min (based on Cr of 5.79). Active Orders Diet DIET DIABETIC CONSISTENT CARB Regular PO intake:  
Patient Vitals for the past 72 hrs: 
 % Diet Eaten 10/08/18 1800 80 % 10/08/18 1300 80 % Will continue to follow as needed. Thank you. Deangelo Lynn, SUMMERN, RN, CDE [facility]

## 2018-10-09 NOTE — PROGRESS NOTES
1825: TRANSFER - IN REPORT: 
 
Verbal report received from Matt(name) on Domingo  being received from CCU(unit) for routine progression of care Report consisted of patients Situation, Background, Assessment and  
Recommendations(SBAR). Information from the following report(s) SBAR, Kardex, Intake/Output, MAR, Recent Results and Cardiac Rhythm NSR/tach was reviewed with the receiving nurse. Opportunity for questions and clarification was provided. Assessment completed upon patients arrival to unit and care assumed. 1900:  
Bedside shift change report given to CHANDNI CASTELLANOS (oncoming nurse). Report included the following information SBAR, Kardex, Intake/Output, MAR, Recent Results and Cardiac Rhythm NSR/tach. SHIFT SUMMARY: 
 
 
 
 
 
[address] NURSING NOTE Admission Date 10/6/2018 Admission Diagnosis DKA (diabetic ketoacidoses) ([facility] [place] [address] Consults IP CONSULT TO NEPHROLOGY Cardiac Monitoring [x] Yes [] No  
  
Purposeful Hourly Rounding [x] Yes   
Shalom Score Total Score: 3 Shalom score 3 or > [x] Bed Alarm [] Avasys [] 1:1 sitter [] Patient refused (Signed refusal form in chart) Alan Score Alan Score: 14 Alan score 14 or < [] PMT consult [] Wound Care consult  
 []  Specialty bed  [] Nutrition consult Influenza Vaccine Received Flu Vaccine for Current Season (usually Sept-March): No  
 Patient/Guardian Refused (Notify MD): No  
  
Oxygen needs? [x] Room air Oxygen @  []1L    []2L    []3L   []4L    []5L   []6L via NC Chronic home O2 use? [] Yes [x] No 
Perform O2 challenge test and document in progress note using smartphrase (.Homeoxygen) Last bowel movement Urinary Catheter [REMOVED] Condom Catheter 10/06/18-Indications for Use: Accurate measurement of urinary output [REMOVED] Urinary Catheter 10/06/18 Maldonado - Temperature-Indications for Use: Accurate measurement of urinary output  [REMOVED] Condom Catheter 10/06/18-Urine Output (mL): 580 ml 
 [REMOVED] Urinary Catheter 10/06/18 Maldonado - Temperature-Urine Output (mL): 375 ml LDAs Peripheral IV 10/09/18 Right Femoral (Active) Site Assessment Clean, dry, & intact 10/9/2018  4:30 PM  
Phlebitis Assessment 0 10/9/2018  4:30 PM  
Infiltration Assessment 0 10/9/2018  4:30 PM  
Dressing Status Clean, dry, & intact 10/9/2018  4:30 PM  
Dressing Type Transparent;Tape 10/9/2018  4:30 PM  
Hub Color/Line Status Pink; Infusing 10/9/2018  4:30 PM  
Alcohol Cap Used Yes 10/9/2018  4:30 PM  
                  
  
Readmission Risk Assessment Tool Score Low Risk 1 Total Score 1 Charlson Comorbidity Score (Age + Comorbid Conditions) Criteria that do not apply:  
 Has Seen PCP in Last 6 Months (Yes=3, No=0) . Living with Significant Other. Assisted Living. LTAC. SNF. or  
Rehab Patient Length of Stay (>5 days = 3) IP Visits Last 12 Months (1-3=4, 4=9, >4=11) Pt. Coverage (Medicare=5 , Medicaid, or Self-Pay=4) Expected Length of Stay 3d 21h Actual Length of Stay 3

## 2018-10-09 NOTE — PROGRESS NOTES
NAME: Tommy Aldana  
     :  1970 MRN:  [id number] Assessment :    Plan: 
--PETER 
rhabdo DKA Severe hypernatremia AMS --Creatinine improved, stable (peaked at 6.1; now 5.8). Great uop. NML renal U/S. CK better 39211 to 7400. Sodium at correction goal this morning (169 to 161). Switch back to 1/2 NS at 250 ml/hr; encourage PO fluids. Goal sodium by tomorrow is ~ 152. Check a bmp this afternoon and again tomorrow am.  
  
 
 
Subjective: Chief Complaint:  Alert. Not confused. No sob. No cp. No n/v/d. Tolerating po. Review of Systems: 
 
Symptom Y/N Comments  Symptom Y/N Comments Fever/Chills    Chest Pain n   
Poor Appetite n   Edema n   
Cough    Abdominal Pain n   
Sputum    Joint Pain SOB/HERNANDEZ n   Pruritis/Rash Nausea/vomit n   Tolerating PT/OT Diarrhea    Tolerating Diet Constipation    Other Could not obtain due to:   
 
Objective: VITALS:  
Last 24hrs VS reviewed since prior progress note. Most recent are: 
Visit Vitals  /68  Pulse (!) 108  Temp 97.5 °F (36.4 °C)  Resp 13  Ht 6' 2\" (1.88 m)  Wt 117.9 kg (260 lb)  SpO2 97%  BMI 33.38 kg/m2 Intake/Output Summary (Last 24 hours) at 10/09/18 0555 Last data filed at 10/09/18 0000 Gross per 24 hour Intake          4711.75 ml Output             3930 ml Net           781.75 ml Telemetry Reviewed: PHYSICAL EXAM: 
General: NAD 
CTA 
RRR 
abd soft No edema Lab Data Reviewed: (see below) Medications Reviewed: (see below) PMH/SH reviewed - no change compared to H&P 
________________________________________________________________________ Care Plan discussed with: 
Patient y Family RN y   
Care Manager Consultant:     
 
  Comments >50% of visit spent in counseling and coordination of care ________________________________________________________________________ Ron Becerra MD  
 
Procedures: see electronic medical records for all procedures/Xrays and details which 
were not copied into this note but were reviewed prior to creation of Plan. LABS: 
Recent Labs 10/09/18 
 0357  10/08/18 
 0401 WBC  10.6  12.7* HGB  12.0*  14.0  
HCT  36.4*  41.6 PLT  82*  115* Recent Labs 10/08/18 
 1602  10/08/18 
 0401  10/07/18 
 1944   10/07/18 
 4430  10/06/18 
 2020   10/06/18 
 4891 NA  157*  169*  170*   < >  171*  170*   < >  157* K  4.8  4.1  4.1   < >  4.1  3.6   < >  3.0*  
CL  125*  136*  137*   < >  138*  137*   < >  122* CO2  22  21  24   < >  26  26   < >  21 BUN  68*  68*  74*   < >  73*  73*   < >  76* CREA  5.84*  6.12*  6.05*   < >  5.79*  5.37*   < >  5.94* GLU  447*  206*  231*   < >  189*  177*   < >  1133* CA  7.9*  8.2*  8.1*   < >  8.9  8.9   < >  8.3*  
MG   --   2.7*   --    --   3.6*  4.0*   < >  4.2*  
PHOS   --   4.1   --    --   1.2*   --    --   1.2*  
 < > = values in this interval not displayed. Recent Labs 10/08/18 
 0401  10/07/18 
 1002  10/06/18 
 6764 LPSE  309  114  889* No results for input(s): INR, PTP, APTT in the last 72 hours. No lab exists for component: INREXT, INREXT No results for input(s): FE, TIBC, PSAT, FERR in the last 72 hours. No results found for: FOL, RBCF Recent Labs 10/06/18 
 1631 PH  7.25* PCO2  30* PO2  83 Recent Labs 10/08/18 
 0401  10/07/18 
 9119 CPK  35911*  [id number]* No components found for: [place] Lab Results Component Value Date/Time  Color YELLOW/STRAW 10/06/2018 05:12 AM  
 Appearance CLEAR 10/06/2018 05:12 AM  
 Specific gravity 1.028 10/06/2018 05:12 AM  
 pH (UA) 5.0 10/06/2018 05:12 AM  
 Protein 30 (A) 10/06/2018 05:12 AM  
 Glucose >1000 (A) 10/06/2018 05:12 AM  
 Ketone 15 (A) 10/06/2018 05:12 AM  
 Bilirubin NEGATIVE  10/06/2018 05:12 AM  
 Urobilinogen 0.2 10/06/2018 05:12 AM  
 Nitrites NEGATIVE  10/06/2018 05:12 AM  
 Leukocyte Esterase NEGATIVE  10/06/2018 05:12 AM  
 Epithelial cells FEW 10/06/2018 05:12 AM  
 Bacteria NEGATIVE  10/06/2018 05:12 AM  
 WBC 0-4 10/06/2018 05:12 AM  
 RBC 0-5 10/06/2018 05:12 AM  
 
 
MEDICATIONS: 
Current Facility-Administered Medications Medication Dose Route Frequency  insulin glargine (LANTUS) injection 15 Units  15 Units SubCUTAneous DAILY  insulin lispro (HUMALOG) injection   SubCUTAneous AC&HS  
 0.9% sodium chloride infusion  200 mL/hr IntraVENous CONTINUOUS  
 sodium chloride (NS) flush 5-10 mL  5-10 mL IntraVENous PRN  
 sodium chloride (NS) flush 5-10 mL  5-10 mL IntraVENous Q8H  
 sodium chloride (NS) flush 5-10 mL  5-10 mL IntraVENous PRN  
 heparin (porcine) injection 5,000 Units  5,000 Units SubCUTAneous Q8H  
 mupirocin (BACTROBAN) 2 % ointment   Both Nostrils BID  insulin regular (NOVOLIN R, HUMULIN R) 100 Units in 0.9% sodium chloride 100 mL infusion  0-50 Units/hr IntraVENous TITRATE  insulin lispro (HUMALOG) injection   SubCUTAneous TIDAC  glucose chewable tablet 16 g  4 Tab Oral PRN  
 dextrose (D50W) injection syrg 12.5-25 g  25-50 mL IntraVENous PRN  
 glucagon (GLUCAGEN) injection 1 mg  1 mg IntraMUSCular PRN  
 famotidine (PF) (PEPCID) 20 mg in sodium chloride 0.9% 10 mL injection  20 mg IntraVENous Q24H

## 2018-10-09 NOTE — PROGRESS NOTES
Problem: Falls - Risk of 
Goal: *Absence of Falls Document [facility] Fall Risk and appropriate interventions in the flowsheet. Outcome: Progressing Towards Goal 
Fall Risk Interventions: 
Mobility Interventions: Bed/chair exit alarm, Communicate number of staff needed for ambulation/transfer, Mechanical lift, OT consult for ADLs, Patient to call before getting OOB, PT Consult for mobility concerns Mentation Interventions: Adequate sleep, hydration, pain control, Bed/chair exit alarm, Door open when patient unattended, Evaluate medications/consider consulting pharmacy, Eyeglasses and hearing aids, Familiar objects from home, Increase mobility, More frequent rounding, Reorient patient, Room close to nurse's station, Self-releasing belt, Toileting rounds, Update white board Medication Interventions: Bed/chair exit alarm, Evaluate medications/consider consulting pharmacy, Patient to call before getting OOB, Teach patient to arise slowly Elimination Interventions: Call light in reach, Elevated toilet seat, Patient to call for help with toileting needs, Toilet paper/wipes in reach, Toileting schedule/hourly rounds History of Falls Interventions: Bed/chair exit alarm, Consult care management for discharge planning, Door open when patient unattended, Evaluate medications/consider consulting pharmacy, Investigate reason for fall, Room close to nurse's station

## 2018-10-09 NOTE — PROGRESS NOTES
0700  Bedside and verbal report from Kim Hill RN. 
0800  Vitals stable  For transfer to room 2524. 
0840  Bedside and verbal report to Blanca Lazo RN. Transferred to room 2524 via bed.

## 2018-10-09 NOTE — PROGRESS NOTES
Hospitalist Progress Note NAME: Kimberlyn Conklin  
:  1970 MRN:  [id number] Assessment / Plan: 
Transfer out of ICU 
 
DKA/HHS: 
-s/p insulin gtt 
-Continue on Lantus 35 units daily and Humalog 10 units TID meals with SSI 
-transfer out of ICU Metabolic Encephalopathy: resovled PETER: secondary to dehydration and rhabdomyolysis-renal function slowly improving Severe Hypernatremia: 
-IVF's per Nephrology, still on hypotonic IVF's 
-follow-up sodium and CK 
-blakely and femoral CVC removed on 10/9 
-case discussed with Dr. Priscilla Vasquez today Hypokalemia: resolved Hypophosphatemia now with Hyperphosphatemia Hypermagnesemia: 
-monitor electrolytes Leukocytosis most likely reactive: resolved Follow blood culture, NG3d Procacitonin pending Empiric IV antibiotic therapy discontinued by Intensivest team 
 
Worsening Thrombocytopenia: 
-discontinue sq heparin and monitor Obesity: Body mass index is 33.38 kg/(m^2). 
  
Code status: Full Prophylaxis: SCD's started Recommended Disposition: TBD Subjective: Chief Complaint / Reason for Physician Visit: follow-up DKA and PETER Patient seen for follow-up Denies complaints Blakely out RN ready to pull femoral CVC Review of Systems: 
Symptom Y/N Comments  Symptom Y/N Comments Fever/Chills    Chest Pain n   
Poor Appetite    Edema Cough    Abdominal Pain n   
Sputum    Joint Pain SOB/HERNANDEZ n   Pruritis/Rash Nausea/vomit    Tolerating PT/OT Diarrhea n   Tolerating Diet y Constipation n   Other n myalgia Could NOT obtain due to:   
 
Objective: VITALS:  
Last 24hrs VS reviewed since prior progress note. Most recent are: 
Patient Vitals for the past 24 hrs: 
 Temp Pulse Resp BP SpO2  
10/09/18 1423 98.4 °F (36.9 °C) 99 19 (!) 152/91 99 % 10/09/18 1400 - 99 16 137/77 98 % 10/09/18 1300 - (!) 114 21 142/76 99 % 10/09/18 1100 - 99 16 (!) 140/96 99 % 10/09/18 1000 - (!) 101 18 (!) 162/107 98 % 10/09/18 0900 98.7 °F (37.1 °C) 95 16 (!) 161/111 100 % 10/09/18 0800 98 °F (36.7 °C) 93 17 (!) 130/92 100 % 10/09/18 0700 - 88 13 (!) 137/99 98 % 10/09/18 0600 - 100 22 (!) 143/93 -  
10/09/18 0500 - 97 18 155/61 99 % 10/09/18 0400 98.3 °F (36.8 °C) (!) 101 12 143/63 98 % 10/09/18 0300 - (!) 101 17 141/83 99 % 10/09/18 0200 - (!) 108 13 128/68 97 % 10/09/18 0100 - 95 17 136/90 98 % 10/09/18 0001 97.5 °F (36.4 °C) (!) 105 22 125/80 98 % 10/09/18 0000 - (!) 105 16 - 99 % 10/08/18 2300 - (!) 101 15 131/75 99 % 10/08/18 2200 - (!) 109 24 132/86 -  
10/08/18 2100 - (!) 107 24 134/84 -  
10/08/18 2000 97.9 °F (36.6 °C) (!) 108 18 151/82 100 % 10/08/18 1900 - (!) 118 23 143/85 96 % 10/08/18 1800 - (!) 118 24 (!) 141/101 -  
10/08/18 1700 - (!) 117 20 135/67 -  
10/08/18 1600 - (!) 115 22 123/68 - Intake/Output Summary (Last 24 hours) at 10/09/18 1541 Last data filed at 10/09/18 1455 Gross per 24 hour Intake          6515.89 ml Output             5200 ml Net          1315.89 ml PHYSICAL EXAM: 
General: Alert, cooperative, NAD  
EENT:  EOMI. Anicteric sclerae. MMM Resp:  CTA bilaterally, no wheezing or rales. No accessory muscle use CV:  Increased rate (110's), no murmur,  trace BLE edema GI:  Soft, Non distended, Non tender.  +Bowel sounds Neurologic:  Alert and oriented X 3, Moves all extremities, normal speech Psych:   Fair insight, not anxious or agitated Skin:  No rashes. No jaundice Reviewed most current lab test results and cultures  YES Reviewed most current radiology test results   YES Review and summation of old records today    NO Reviewed patient's current orders and MAR    YES 
PMH/SH reviewed - no change compared to H&P 
________________________________________________________________________ Care Plan discussed with: 
  Comments Patient x Family  x   
RN x Care Manager Consultant  x Dr. Rosy Oglesby Multidiciplinary team rounds were held today with , nursing, pharmacist and clinical coordinator. Patient's plan of care was discussed; medications were reviewed and discharge planning was addressed. ________________________________________________________________________ Total NON critical care TIME:  25   Minutes Total CRITICAL CARE TIME Spent:   Minutes non procedure based Comments >50% of visit spent in counseling and coordination of care x   
________________________________________________________________________ Toyin Smith MD  
 
Procedures: see electronic medical records for all procedures/Xrays and details which were not copied into this note but were reviewed prior to creation of Plan. LABS: 
I reviewed today's most current labs and imaging studies. Pertinent labs include: 
Recent Labs 10/09/18 
 1278  10/08/18 
 0401  10/07/18 
 2774 WBC  10.6  12.7*  17.7* HGB  12.0*  14.0  14.8 HCT  36.4*  41.6  43.4 PLT  82*  115*  163 Recent Labs 10/09/18 
 5054  10/08/18 
 1602  10/08/18 
 0401   10/07/18 
 8994 NA  161*  157*  169*   < >  171* K  4.4  4.8  4.1   < >  4.1 CL  129*  125*  136*   < >  138* CO2  23  22  21   < >  26 GLU  400*  447*  206*   < >  189* BUN  78*  68*  68*   < >  73* CREA  5.79*  5.84*  6.12*   < >  5.79* CA  7.6*  7.9*  8.2*   < >  8.9 MG  2.6*   --   2.7*   --   3.6* PHOS  5.7*   --   4.1   --   1.2* ALB  2.3*   --    --    --    --   
TBILI  0.4   --    --    --    --   
SGOT  161*   --    --    --    --   
ALT  135*   --    --    --    --   
 < > = values in this interval not displayed.   
 
 
Signed: Toyin Smith MD

## 2018-10-09 NOTE — PROGRESS NOTES
Bedside and Verbal shift change report given to Damien (oncoming nurse) by Feliciano TARIQ (offgoing nurse). Report included the following information SBAR, Kardex, Procedure Summary, Intake/Output, MAR, Accordion, Recent Results, Cardiac Rhythm NSR, Alarm Parameters  and Procedure Verification. PT received alert and oriented, no complaints of pain. Insulin drip discontinued on day shift. Sodium decreased, but remains elevated. Normal saline started at 200 ml/hr by renal. Pt blood glucose 591 at 0000. Insulin drip restarted per Rodo Sneed MD. Blood sugars <300 by morning. Pt with >150 ml urine output/hr overnight. Sodium level critical 161 on morning labs. Bedside and Verbal shift change report given to [facility] (oncoming nurse) by Nelly Weaver (offgoing nurse). Report included the following information SBAR, Kardex, Procedure Summary, Intake/Output, MAR, Accordion, Recent Results, Cardiac Rhythm NSR, Alarm Parameters , Procedure Verification and Quality Measures.

## 2018-10-09 NOTE — PROGRESS NOTES
Primary Nurse Bertha Felder RN and Cathy Stone RN performed a dual skin assessment on this patient No impairment noted Alan score is 22 Bedside shift change report given to Jaskaran Green RN (oncoming nurse). Report included the following information SBAR, Kardex, Intake/Output, MAR and Recent Results. SHIFT SUMMARY: 
 
 
 
 
 
[address] NURSING NOTE Admission Date 10/6/2018 Admission Diagnosis DKA (diabetic ketoacidoses) ([facility] [address] Consults IP CONSULT TO NEPHROLOGY Cardiac Monitoring [x] Yes [] No  
  
Purposeful Hourly Rounding [x] Yes   
Shalom Score Total Score: 2 Shalom score 3 or > [] Bed Alarm [] Avasys [] 1:1 sitter [] Patient refused (Signed refusal form in chart) Alan Score Alan Score: 20 Alan score 14 or < [] PMT consult [] Wound Care consult  
 []  Specialty bed  [] Nutrition consult Influenza Vaccine Received Flu Vaccine for Current Season (usually Sept-March): No  
 Patient/Guardian Refused (Notify MD): No  
  
Oxygen needs? [x] Room air Oxygen @  []1L    []2L    []3L   []4L    []5L   []6L via NC Chronic home O2 use? [] Yes [x] No 
Perform O2 challenge test and document in progress note using smartphrase (.Homeoxygen) Last bowel movement Urinary Catheter [REMOVED] Condom Catheter 10/06/18-Indications for Use: Accurate measurement of urinary output [REMOVED] Urinary Catheter 10/06/18 Maldonado - Temperature-Indications for Use: Accurate measurement of urinary output [REMOVED] Condom Catheter 10/06/18-Urine Output (mL): 580 ml [REMOVED] Urinary Catheter 10/06/18 Maldonado - Temperature-Urine Output (mL): 375 ml LDAs Peripheral IV 10/09/18 Right Forearm (Active) Site Assessment Clean, dry, & intact 10/9/2018  7:26 PM  
Phlebitis Assessment 0 10/9/2018  7:26 PM  
Infiltration Assessment 0 10/9/2018  7:26 PM  
Dressing Status Clean, dry, & intact 10/9/2018  7:26 PM  
Dressing Type Transparent 10/9/2018  7:26 PM  
 Hub Color/Line Status Pink; Infusing 10/9/2018  7:26 PM  
Alcohol Cap Used Yes 10/9/2018  4:30 PM  
                  
  
Readmission Risk Assessment Tool Score Low Risk 1 Total Score 1 Charlson Comorbidity Score (Age + Comorbid Conditions) Criteria that do not apply:  
 Has Seen PCP in Last 6 Months (Yes=3, No=0) . Living with Significant Other. Assisted Living. LTAC. SNF. or  
Rehab Patient Length of Stay (>5 days = 3) IP Visits Last 12 Months (1-3=4, 4=9, >4=11) Pt. Coverage (Medicare=5 , Medicaid, or Self-Pay=4) Expected Length of Stay 3d 21h Actual Length of Stay 3

## 2018-10-09 NOTE — PROGRESS NOTES
Care Management: 
 
Patient much more alert and oriented today and says he is feeling much better. Girlfriend Alex Ogden at bedside. I asked about choosing a PCP but they had not had a chance to really talk about it yet . They have the list . Anticipate discharge home and will need set up with follow up appointment to follow for diabetes. Alex Andradebenkrystal ( girlfriend) says she has been talking with a few people and is looking into a endocrinologist.  
 
Diabetes Management following for new DM diagnosis. Eduardo Tijerina [facility] [id number]

## 2018-10-09 NOTE — PROGRESS NOTES
10/9/2018 INTENSIVIST PROGRESS NOTE:  
 
Patient seen and evaluated, chart reviewed. Patient admitted with AMS. Found to have DKA. Severely hypernatremic. Altered severely on admission. Much more alert now. Denies complaint. Visit Vitals  BP (!) 137/99  Pulse 88  Temp 98.3 °F (36.8 °C)  Resp 13  Ht 6' 2\" (1.88 m)  Wt 117.9 kg (260 lb)  SpO2 98%  BMI 33.38 kg/m2 General:  No acute distress Eyes: anicteric HEENT: NC, scalp staples in place CV:  Tachy, regular Lungs: CTA B, no wheeze Abd: soft, +BS, no focal tenderness Ext: no cyanosis, no clubbing Skin: warm and dry Musculoskeletal: no gross deformities Neuro: alert, oriented Labs reviewed A/P: 
- DKA/HHS - titrate insulin, DM education  
- severe hypernatremia - hypotonic fluids per nephrology - pancreatitis by enzymes only 
- acute renal failure - IV fluids, nephrology following 
- rhabdomyolysis - IV fluids 
- encephalopathy 
- advance diet 
- DVT prophylaxis Don Blanca MD

## 2018-10-09 NOTE — PROGRESS NOTES
6496:  Bedside handoff report received from Maria Victoria Escoto RN. Patient transferred to room 2524. 1:  Spoke with Dr. Jud Don regarding patient's transition off of insulin drip. Basal insulin to be given at ~1100; with continuous drip stopping 2 hours after. 1013:  Transitional insulin (35 units of insulin glargine)  given at this time. 1202:  Patient's blakely removed per nursing driven protocol; urinal provided. 1208:  Patient's insulin drip stopped at this time. 1312: Attempted iv insertion x2 on patient to facilitate femoral line removal; unable to gain access. 1555:  Patient's femoral line removed at this time; manual pressure held, hemostasis achieved. Patient tolerated well. PIV in place. 1645:  Patient's ; dr. Jeremiah Vegas paged; orders received to give 10 units for sliding scale coverage in addition to patient's 10 units of prandial lispro. 1720:  Spoke with Dr. Edy Sy regarding patient's afternoon chemistry results; orders received to reduce patient's maintenance fluids to 225 mL per hour and recheck sodium level with AM labs. If patient's sodium is below 148, patient's nurse to call for further orders. If patient's sodium is 148-156 no need to call result. 1757:  TRANSFER - OUT REPORT: 
 
Verbal report given to Karissa Fierro RN on Carmen Marsh  being transferred to [facility](unit) for routine progression of care Report consisted of patients Situation, Background, Assessment and  
Recommendations(SBAR). Information from the following report(s) SBAR, MAR, Recent Results and Cardiac Rhythm NSR, Sinus Tach was reviewed with the receiving nurse. Lines:  
Peripheral IV 10/09/18 Right Femoral (Active) Site Assessment Clean, dry, & intact 10/9/2018  4:30 PM  
Phlebitis Assessment 0 10/9/2018  4:30 PM  
Infiltration Assessment 0 10/9/2018  4:30 PM  
Dressing Status Clean, dry, & intact 10/9/2018  4:30 PM  
Dressing Type Transparent;Tape 10/9/2018  4:30 PM  
 Hub Color/Line Status Pink; Infusing 10/9/2018  4:30 PM  
Alcohol Cap Used Yes 10/9/2018  4:30 PM  
  
 
Opportunity for questions and clarification was provided. Patient transported with: 
 Monitor Registered Nurse Roman Elizabeth RN

## 2018-10-10 LAB
ALBUMIN SERPL-MCNC: 2.4 G/DL (ref 3.5–5)
ALBUMIN/GLOB SERPL: 0.6 {RATIO} (ref 1.1–2.2)
ALP SERPL-CCNC: 97 U/L (ref 45–117)
ALT SERPL-CCNC: 151 U/L (ref 12–78)
ANION GAP SERPL CALC-SCNC: 7 MMOL/L (ref 5–15)
APTT PPP: 23.4 SEC (ref 22.1–32)
AST SERPL-CCNC: 158 U/L (ref 15–37)
BILIRUB DIRECT SERPL-MCNC: 0.1 MG/DL (ref 0–0.2)
BILIRUB SERPL-MCNC: 0.5 MG/DL (ref 0.2–1)
BUN SERPL-MCNC: 65 MG/DL (ref 6–20)
BUN/CREAT SERPL: 14 (ref 12–20)
CALCIUM SERPL-MCNC: 8 MG/DL (ref 8.5–10.1)
CHLORIDE SERPL-SCNC: 122 MMOL/L (ref 97–108)
CK SERPL-CCNC: 5633 U/L (ref 39–308)
CO2 SERPL-SCNC: 22 MMOL/L (ref 21–32)
CREAT SERPL-MCNC: 4.6 MG/DL (ref 0.7–1.3)
ERYTHROCYTE [DISTWIDTH] IN BLOOD BY AUTOMATED COUNT: 14.6 % (ref 11.5–14.5)
FIBRINOGEN PPP-MCNC: 406 MG/DL (ref 200–475)
GLOBULIN SER CALC-MCNC: 4.1 G/DL (ref 2–4)
GLUCOSE BLD STRIP.AUTO-MCNC: 299 MG/DL (ref 65–100)
GLUCOSE BLD STRIP.AUTO-MCNC: 335 MG/DL (ref 65–100)
GLUCOSE BLD STRIP.AUTO-MCNC: 342 MG/DL (ref 65–100)
GLUCOSE BLD STRIP.AUTO-MCNC: 410 MG/DL (ref 65–100)
GLUCOSE SERPL-MCNC: 322 MG/DL (ref 65–100)
HAPTOGLOB SERPL-MCNC: 290 MG/DL (ref 30–200)
HCT VFR BLD AUTO: 37.9 % (ref 36.6–50.3)
HGB BLD-MCNC: 11.8 G/DL (ref 12.1–17)
INR PPP: 1 (ref 0.9–1.1)
LDH SERPL L TO P-CCNC: 706 U/L (ref 85–241)
MAGNESIUM SERPL-MCNC: 2.1 MG/DL (ref 1.6–2.4)
MCH RBC QN AUTO: 26.8 PG (ref 26–34)
MCHC RBC AUTO-ENTMCNC: 31.1 G/DL (ref 30–36.5)
MCV RBC AUTO: 85.9 FL (ref 80–99)
NRBC # BLD: 0 K/UL (ref 0–0.01)
NRBC BLD-RTO: 0 PER 100 WBC
PHOSPHATE SERPL-MCNC: 4.7 MG/DL (ref 2.6–4.7)
PLATELET # BLD AUTO: 66 K/UL (ref 150–400)
PMV BLD AUTO: 11.8 FL (ref 8.9–12.9)
POTASSIUM SERPL-SCNC: 4.7 MMOL/L (ref 3.5–5.1)
PROT SERPL-MCNC: 6.5 G/DL (ref 6.4–8.2)
PROTHROMBIN TIME: 10.7 SEC (ref 9–11.1)
RBC # BLD AUTO: 4.41 M/UL (ref 4.1–5.7)
RETICS # AUTO: 0.02 M/UL (ref 0.03–0.1)
RETICS/RBC NFR AUTO: 0.4 % (ref 0.7–2.1)
SERVICE CMNT-IMP: ABNORMAL
SODIUM SERPL-SCNC: 151 MMOL/L (ref 136–145)
THERAPEUTIC RANGE,PTTT: NORMAL SECS (ref 58–77)
WBC # BLD AUTO: 12.1 K/UL (ref 4.1–11.1)

## 2018-10-10 PROCEDURE — 74011250637 HC RX REV CODE- 250/637: Performed by: INTERNAL MEDICINE

## 2018-10-10 PROCEDURE — 90686 IIV4 VACC NO PRSV 0.5 ML IM: CPT | Performed by: INTERNAL MEDICINE

## 2018-10-10 PROCEDURE — 82550 ASSAY OF CK (CPK): CPT | Performed by: INTERNAL MEDICINE

## 2018-10-10 PROCEDURE — 82542 COL CHROMOTOGRAPHY QUAL/QUAN: CPT | Performed by: INTERNAL MEDICINE

## 2018-10-10 PROCEDURE — 83615 LACTATE (LD) (LDH) ENZYME: CPT | Performed by: INTERNAL MEDICINE

## 2018-10-10 PROCEDURE — 85730 THROMBOPLASTIN TIME PARTIAL: CPT | Performed by: INTERNAL MEDICINE

## 2018-10-10 PROCEDURE — 74011636637 HC RX REV CODE- 636/637: Performed by: INTERNAL MEDICINE

## 2018-10-10 PROCEDURE — 85384 FIBRINOGEN ACTIVITY: CPT | Performed by: INTERNAL MEDICINE

## 2018-10-10 PROCEDURE — 86022 PLATELET ANTIBODIES: CPT | Performed by: INTERNAL MEDICINE

## 2018-10-10 PROCEDURE — 83735 ASSAY OF MAGNESIUM: CPT | Performed by: INTERNAL MEDICINE

## 2018-10-10 PROCEDURE — 90471 IMMUNIZATION ADMIN: CPT

## 2018-10-10 PROCEDURE — 84100 ASSAY OF PHOSPHORUS: CPT | Performed by: INTERNAL MEDICINE

## 2018-10-10 PROCEDURE — 82962 GLUCOSE BLOOD TEST: CPT

## 2018-10-10 PROCEDURE — 85045 AUTOMATED RETICULOCYTE COUNT: CPT | Performed by: INTERNAL MEDICINE

## 2018-10-10 PROCEDURE — 74011250636 HC RX REV CODE- 250/636: Performed by: INTERNAL MEDICINE

## 2018-10-10 PROCEDURE — 74011000258 HC RX REV CODE- 258: Performed by: INTERNAL MEDICINE

## 2018-10-10 PROCEDURE — 80076 HEPATIC FUNCTION PANEL: CPT | Performed by: INTERNAL MEDICINE

## 2018-10-10 PROCEDURE — 36415 COLL VENOUS BLD VENIPUNCTURE: CPT | Performed by: INTERNAL MEDICINE

## 2018-10-10 PROCEDURE — 85610 PROTHROMBIN TIME: CPT | Performed by: INTERNAL MEDICINE

## 2018-10-10 PROCEDURE — 83010 ASSAY OF HAPTOGLOBIN QUANT: CPT | Performed by: INTERNAL MEDICINE

## 2018-10-10 PROCEDURE — 80048 BASIC METABOLIC PNL TOTAL CA: CPT | Performed by: INTERNAL MEDICINE

## 2018-10-10 PROCEDURE — 85027 COMPLETE CBC AUTOMATED: CPT | Performed by: INTERNAL MEDICINE

## 2018-10-10 PROCEDURE — 65660000000 HC RM CCU STEPDOWN

## 2018-10-10 RX ORDER — FAMOTIDINE 20 MG/1
20 TABLET, FILM COATED ORAL DAILY
Status: DISCONTINUED | OUTPATIENT
Start: 2018-10-10 | End: 2018-10-12 | Stop reason: HOSPADM

## 2018-10-10 RX ORDER — INSULIN GLARGINE 100 [IU]/ML
45 INJECTION, SOLUTION SUBCUTANEOUS DAILY
Status: DISCONTINUED | OUTPATIENT
Start: 2018-10-11 | End: 2018-10-11

## 2018-10-10 RX ORDER — INSULIN LISPRO 100 [IU]/ML
15 INJECTION, SOLUTION INTRAVENOUS; SUBCUTANEOUS
Status: DISCONTINUED | OUTPATIENT
Start: 2018-10-11 | End: 2018-10-12

## 2018-10-10 RX ORDER — INSULIN LISPRO 100 [IU]/ML
10 INJECTION, SOLUTION INTRAVENOUS; SUBCUTANEOUS
Status: DISCONTINUED | OUTPATIENT
Start: 2018-10-10 | End: 2018-10-10

## 2018-10-10 RX ADMIN — MUPIROCIN: 20 OINTMENT TOPICAL at 08:35

## 2018-10-10 RX ADMIN — INSULIN LISPRO 10 UNITS: 100 INJECTION, SOLUTION INTRAVENOUS; SUBCUTANEOUS at 12:23

## 2018-10-10 RX ADMIN — FAMOTIDINE 20 MG: 20 TABLET ORAL at 12:27

## 2018-10-10 RX ADMIN — SODIUM CHLORIDE 225 ML/HR: 450 INJECTION, SOLUTION INTRAVENOUS at 06:13

## 2018-10-10 RX ADMIN — SODIUM CHLORIDE 225 ML/HR: 450 INJECTION, SOLUTION INTRAVENOUS at 22:15

## 2018-10-10 RX ADMIN — INSULIN LISPRO 3 UNITS: 100 INJECTION, SOLUTION INTRAVENOUS; SUBCUTANEOUS at 21:57

## 2018-10-10 RX ADMIN — INSULIN LISPRO 10 UNITS: 100 INJECTION, SOLUTION INTRAVENOUS; SUBCUTANEOUS at 12:24

## 2018-10-10 RX ADMIN — INSULIN LISPRO 10 UNITS: 100 INJECTION, SOLUTION INTRAVENOUS; SUBCUTANEOUS at 17:29

## 2018-10-10 RX ADMIN — Medication 10 ML: at 15:33

## 2018-10-10 RX ADMIN — SODIUM CHLORIDE 225 ML/HR: 450 INJECTION, SOLUTION INTRAVENOUS at 13:24

## 2018-10-10 RX ADMIN — MUPIROCIN: 20 OINTMENT TOPICAL at 17:30

## 2018-10-10 RX ADMIN — INSULIN LISPRO 7 UNITS: 100 INJECTION, SOLUTION INTRAVENOUS; SUBCUTANEOUS at 08:33

## 2018-10-10 RX ADMIN — INSULIN GLARGINE 35 UNITS: 100 INJECTION, SOLUTION SUBCUTANEOUS at 12:23

## 2018-10-10 RX ADMIN — SODIUM CHLORIDE 225 ML/HR: 450 INJECTION, SOLUTION INTRAVENOUS at 17:44

## 2018-10-10 RX ADMIN — INSULIN LISPRO 10 UNITS: 100 INJECTION, SOLUTION INTRAVENOUS; SUBCUTANEOUS at 08:33

## 2018-10-10 RX ADMIN — INFLUENZA VIRUS VACCINE 0.5 ML: 15; 15; 15; 15 SUSPENSION INTRAMUSCULAR at 12:25

## 2018-10-10 RX ADMIN — Medication 10 ML: at 02:01

## 2018-10-10 RX ADMIN — INSULIN LISPRO 7 UNITS: 100 INJECTION, SOLUTION INTRAVENOUS; SUBCUTANEOUS at 17:29

## 2018-10-10 RX ADMIN — Medication 10 ML: at 21:57

## 2018-10-10 RX ADMIN — SODIUM CHLORIDE 225 ML/HR: 450 INJECTION, SOLUTION INTRAVENOUS at 02:00

## 2018-10-10 NOTE — CONSULTS
[address] [place], [place] [address]son Loss 
MR#: [id number] : 1970 ACCOUNT #: [de-identified] DATE OF SERVICE: 10/10/2018 REASON FOR CONSULTATION:  Thrombocytopenia. REFERRING PHYSICIAN:  Jeff Herrera MD 
 
HISTORY OF PRESENT ILLNESS:  The patient is a 77-year-old gentleman who was admitted on 10/06/2018 with DKA and rhabdo. That has all improved. We are asked to see him for thrombocytopenia. When the patient came in, his platelet count was normal.  It has trended down to 66 today. He had some acute renal failure as well with his DKA, but his renal function has improved. His rhabdo has improved. We are asked to see him for further evaluation. He is feeling well this morning with really no complaints. PAST MEDICAL HISTORY:  Significant for diabetes. ALLERGIES:  NO KNOWN DRUG ALLERGIES. FAMILY HISTORY:  No history of any cancers. SOCIAL HISTORY:  He does not smoke. He occasionally drinks. CURRENT MEDICATIONS:  He is on insulin. REVIEW OF SYSTEMS:  Twelve-point systems done and negative except for as above. PHYSICAL EXAMINATION: 
VITAL SIGNS:  Temperature 98.4, pulse 94, blood pressure 107/69, respirations 17, satting 98% on room air. GENERAL:  Sitting up in chair, in no acute distress. HEENT:  Normocephalic, atraumatic. Oropharynx clear without lesion. NECK:  Supple. No cervical, supraclavicular, or axillary lymphadenopathy appreciated. CARDIOVASCULAR:  Regular rate and rhythm. LUNGS:  Clear to auscultation bilaterally. ABDOMEN:  Normoactive bowel sounds. Soft, nontender, nondistended. No hepatosplenomegaly. EXTREMITIES:  No clubbing, cyanosis, or edema. NEUROLOGIC:  Nonfocal.  Alert and oriented x3. LABORATORY VALUES:  White blood cell count 12.1, hemoglobin 11.8, platelets 66. Chemistry:  Sodium 151, potassium 4.7, BUN 65, creatinine 4.6, total bilirubin 0.5, , . IMPRESSION AND PLAN:  The patient is a 77-year-old gentleman who we are asked to see for thrombocytopenia. His platelet count was normal when he came in. He came in with diabetic ketoacidosis. He had rhabdomyolysis. His platelet count has trended down. He is not having any bleeding. I am going to send off some blood work including checking hemolysis labs. His LDH is slightly up. We will see what his retic and haptoglobin show. I will take a look at his smear. His renal function is improving. His bilirubin is not elevated. I do not think there is much hemolysis going on. I will check for any signs of disseminated intravascular coagulation. He did get some heparin. I do not think that this is heparin-induced thrombocytopenia, but we will go ahead and send off a HIT antibody and serotonin release assay. He has no signs of any clots and we will watch that for now. We will continue to follow along with you and make further recommendations as needed. MD GLENNA Matute / RODDY 
D: 10/10/2018 10:20    
T: 10/10/2018 10:41 JOB #: [id number]

## 2018-10-10 NOTE — PROGRESS NOTES
Hospitalist Progress Note NAME: May Modi  
:  1970 MRN:  [id number] Assessment / Plan: 
DKA/HHS: 
-s/p insulin gtt 
-Continue on Lantus 35 units daily and Humalog 10 units TID meals with SSI 
-monitor further glucose trends before further up-titration Metabolic Encephalopathy: resovled PETER: secondary to dehydration and rhabdomyolysis-renal function slowly improving Severe Hypernatremia: peaked at 171, improving 
-IVF's per Nephrology, still on hypotonic IVF's 
-Na 151 today and CK down-trending too 
-blakely and femoral CVC removed on 10/9 
-case discussed with Dr. Arrington Course again today Hypokalemia: resolved Hypophosphatemia now with Hyperphosphatemia: wnl today Hypermagnesemia: improved 
-monitor electrolytes Leukocytosis most likely reactive: resolved Follow blood culture, NG3d Procacitonin pending Empiric IV antibiotic therapy discontinued by Intensivest team 
 
Worsening Thrombocytopenia: 
-sq heparin discontinued on 10/9; platelets lower today (66K) thus Hematology consulted 
-plans for work-up noted, appreciate assistance Obesity: Body mass index is 33.38 kg/(m^2). 
  
Code status: Full Prophylaxis: SCD's started Recommended Disposition: TBD Subjective: Chief Complaint / Reason for Physician Visit: follow-up DKA and PETER Patient seen for follow-up Denies complaints Platelets lower today Sodium and renal function improving Review of Systems: 
Symptom Y/N Comments  Symptom Y/N Comments Fever/Chills    Chest Pain n   
Poor Appetite    Edema Cough    Abdominal Pain n   
Sputum    Joint Pain SOB/HERNANDEZ n   Pruritis/Rash Nausea/vomit    Tolerating PT/OT Diarrhea    Tolerating Diet y Constipation    Other Could NOT obtain due to:   
 
Objective: VITALS:  
Last 24hrs VS reviewed since prior progress note. Most recent are: 
Patient Vitals for the past 24 hrs: 
 Temp Pulse Resp BP SpO2 10/10/18 0719 98.4 °F (36.9 °C) 94 17 107/69 98 % 10/10/18 0206 98.1 °F (36.7 °C) 90 20 116/68 99 % 10/09/18 2255 98.2 °F (36.8 °C) 93 20 133/65 96 % 10/09/18 1832 99.2 °F (37.3 °C) (!) 103 20 120/70 100 % 10/09/18 1630 98.7 °F (37.1 °C) (!) 106 20 144/48 98 % 10/09/18 1600 - (!) 107 21 140/57 -  
10/09/18 1423 98.4 °F (36.9 °C) 99 19 (!) 152/91 99 % 10/09/18 1400 - 99 16 137/77 98 % 10/09/18 1300 - (!) 114 21 142/76 99 % Intake/Output Summary (Last 24 hours) at 10/10/18 1141 Last data filed at 10/10/18 1043 Gross per 24 hour Intake          6372.37 ml Output             2775 ml Net          3597.37 ml PHYSICAL EXAM: 
General: Alert, cooperative, NAD  
EENT:  EOMI. Anicteric sclerae. MMM Resp:  CTA bilaterally, no wheezing or rales. No accessory muscle use CV:  IRRR, no murmur,  trace BLE edema unchanged GI:  Soft, Non distended, Non tender.  +Bowel sounds Neurologic:  Alert and oriented X 3, Moves all extremities, normal speech Psych:   Fair insight, not anxious or agitated Skin:  No rashes. No jaundice Reviewed most current lab test results and cultures  YES Reviewed most current radiology test results   YES Review and summation of old records today    NO Reviewed patient's current orders and MAR    YES 
PMH/SH reviewed - no change compared to H&P 
________________________________________________________________________ Care Plan discussed with: 
  Comments Patient x Family RN Care Manager Consultant  x Dr. Kieran Anderson Multidiciplinary team rounds were held today with , nursing, pharmacist and clinical coordinator. Patient's plan of care was discussed; medications were reviewed and discharge planning was addressed. ________________________________________________________________________ Total NON critical care TIME:  20   Minutes Total CRITICAL CARE TIME Spent:   Minutes non procedure based Comments >50% of visit spent in counseling and coordination of care    
________________________________________________________________________ Lisa Tobar MD  
 
Procedures: see electronic medical records for all procedures/Xrays and details which were not copied into this note but were reviewed prior to creation of Plan. LABS: 
I reviewed today's most current labs and imaging studies. Pertinent labs include: 
Recent Labs 10/10/18 
 0235  10/09/18 
 0357  10/08/18 
 0401 WBC  12.1*  10.6  12.7* HGB  11.8*  12.0*  14.0  
HCT  37.9  36.4*  41.6 PLT  66*  82*  115* Recent Labs 10/10/18 
 0235  10/09/18 
 1544  10/09/18 
 0357   10/08/18 
 0401 NA  151*  157*  161*   < >  169*  
K  4.7  4.5  4.4   < >  4.1 CL  122*  125*  129*   < >  136* CO2  22  23  23   < >  21 GLU  322*  253*  400*   < >  206* BUN  65*  65*  78*   < >  68* CREA  4.60*  4.85*  5.79*   < >  6.12* CA  8.0*  8.0*  7.6*   < >  8.2* MG  2.1   --   2.6*   --   2.7* PHOS  4.7   --   5.7*   --   4.1 ALB  2.4*   --   2.3*   --    --   
TBILI  0.5   --   0.4   --    --   
SGOT  158*   --   161*   --    --   
ALT  151*   --   135*   --    --   
 < > = values in this interval not displayed.   
 
 
Signed: Lisa Tobar MD

## 2018-10-10 NOTE — PROGRESS NOTES
0700: Bedside report received from Yanelis Walker, [address]: PCT notified RN of BG level of 410. Dr. Angel George paged. Orders received to give 10U lispro for coverage, in addition to 10U lispro standing. 1900:  
Bedside shift change report given to Doron Acosta RN (oncoming nurse). Report included the following information SBAR, Kardex, Intake/Output, MAR, Recent Results and Cardiac Rhythm NSR/tach. SHIFT SUMMARY: 
 
 
 
 
 
[address] NURSING NOTE Admission Date 10/6/2018 Admission Diagnosis DKA (diabetic ketoacidoses) ([facility] [address] Consults IP CONSULT TO NEPHROLOGY 
IP CONSULT TO HEMATOLOGY Cardiac Monitoring [x] Yes [] No  
  
Purposeful Hourly Rounding [x] Yes   
Shalom Score Total Score: 2 Shalom score 3 or > [] Bed Alarm [] Avasys [] 1:1 sitter [] Patient refused (Signed refusal form in chart) Alan Score Alan Score: 21 Alan score 14 or < [] PMT consult [] Wound Care consult  
 []  Specialty bed  [] Nutrition consult Influenza Vaccine Received Flu Vaccine for Current Season (usually Sept-March): No  
 Patient/Guardian Refused (Notify MD): No  
  
Oxygen needs? [x] Room air Oxygen @  []1L    []2L    []3L   []4L    []5L   []6L via NC Chronic home O2 use? [] Yes [x] No 
Perform O2 challenge test and document in progress note using smartphrase (.Homeoxygen) Last bowel movement Last Bowel Movement Date: 10/10/18 Urinary Catheter [REMOVED] Condom Catheter 10/06/18-Indications for Use: Accurate measurement of urinary output [REMOVED] Urinary Catheter 10/06/18 Maldonado - Temperature-Indications for Use: Accurate measurement of urinary output [REMOVED] Condom Catheter 10/06/18-Urine Output (mL): 580 ml [REMOVED] Urinary Catheter 10/06/18 Maldonado - Temperature-Urine Output (mL): 375 ml LDAs Peripheral IV 10/10/18 Right Forearm (Active) Site Assessment Clean, dry, & intact 10/10/2018  3:06 PM  
Phlebitis Assessment 0 10/10/2018  3:06 PM  
 Infiltration Assessment 0 10/10/2018  3:06 PM  
Dressing Status Clean, dry, & intact 10/10/2018  3:06 PM  
Dressing Type Transparent 10/10/2018  3:06 PM  
Hub Color/Line Status Blue;Flushed 10/10/2018  3:06 PM  
                  
  
Readmission Risk Assessment Tool Score Low Risk 1 Total Score 1 Charlson Comorbidity Score (Age + Comorbid Conditions) Criteria that do not apply:  
 Has Seen PCP in Last 6 Months (Yes=3, No=0) . Living with Significant Other. Assisted Living. LTAC. SNF. or  
Rehab Patient Length of Stay (>5 days = 3) IP Visits Last 12 Months (1-3=4, 4=9, >4=11) Pt. Coverage (Medicare=5 , Medicaid, or Self-Pay=4) Expected Length of Stay 3d 21h Actual Length of Stay 4

## 2018-10-10 NOTE — DIABETES MGMT
DTC Progress Note Recommendations/ Comments: Pt BG this am 335 mg/dL. Insulin gtt discontinued ~1143 am yesterday. Pt received Lantus 35 units yesterday am. Noted lispro with meals 10 units ordered. Please consider: 
1. Increasing Lantus to 45 units Chart reviewed on [facility] during Multidisciplinary Rounds. A1c:  
Lab Results Component Value Date/Time Hemoglobin A1c 13.1 (H) 10/06/2018 08:44 AM  
 
 
 
 
Recent Glucose Results:  
Lab Results Component Value Date/Time  (H) 10/10/2018 02:35 AM  
  (H) 10/09/2018 03:44 PM  
 GLUCPOC 335 (H) 10/10/2018 07:26 AM  
 GLUCPOC 421 (H) 10/09/2018 09:23 PM  
 GLUCPOC 373 (H) 10/09/2018 04:42 PM  
  
 
Lab Results Component Value Date/Time Creatinine 4.60 (H) 10/10/2018 02:35 AM  
 
Estimated Creatinine Clearance: 26.8 mL/min (based on Cr of 4.6). Active Orders Diet DIET DIABETIC CONSISTENT CARB Regular PO intake:  
Patient Vitals for the past 72 hrs: 
 % Diet Eaten 10/09/18 1633 75 % 10/08/18 1800 80 % 10/08/18 1300 80 % Will continue to follow as needed. Thank you. João Segura RD [facility] Office: [phone]

## 2018-10-10 NOTE — PROGRESS NOTES
NAME: Donny Hills  
     :  1970 MRN:  [id number] Assessment :    Plan: 
--PETER 
rhabdo DKA Severe hypernatremia AMS 
thrombocytopenia --Creatinine improved (peaked at 6.1; now 4.6). Great uop. NML renal U/S. CK better 56995 to 7400 to 5600. Sodium improving and at correction goal (169 to 161 to 151). Continue 1/2 NS at 225 ml/hr; encourage PO fluids.  
 
hgb just a tad low now - will go ahead and send hemolysis labs Subjective: Chief Complaint:  Alert. Not confused. No sob. No cp. No n/v/d. Tolerating po. We discussed the above. Review of Systems: 
 
Symptom Y/N Comments  Symptom Y/N Comments Fever/Chills    Chest Pain n   
Poor Appetite n   Edema n   
Cough    Abdominal Pain n   
Sputum    Joint Pain SOB/HERNANDEZ n   Pruritis/Rash Nausea/vomit n   Tolerating PT/OT Diarrhea    Tolerating Diet Constipation    Other Could not obtain due to:   
 
Objective: VITALS:  
Last 24hrs VS reviewed since prior progress note. Most recent are: 
Visit Vitals  /68 (BP 1 Location: Left arm, BP Patient Position: At rest)  Pulse 90  Temp 98.1 °F (36.7 °C)  Resp 20  
 Ht 6' 2\" (1.88 m)  Wt 117.9 kg (260 lb)  SpO2 99%  BMI 33.38 kg/m2 Intake/Output Summary (Last 24 hours) at 10/10/18 0537 Last data filed at 10/10/18 0302 Gross per 24 hour Intake          8197.68 ml Output             3825 ml Net          4372.68 ml Telemetry Reviewed: PHYSICAL EXAM: 
General: NAD 
CTA 
RRR 
abd soft No edema Lab Data Reviewed: (see below) Medications Reviewed: (see below) PMH/SH reviewed - no change compared to H&P 
________________________________________________________________________ Care Plan discussed with: 
Patient y Family RN y   
Care Manager Consultant:     
 
  Duong >50% of visit spent in counseling and coordination of care    
 
________________________________________________________________________ Mima Callejas MD  
 
Procedures: see electronic medical records for all procedures/Xrays and details which 
were not copied into this note but were reviewed prior to creation of Plan. LABS: 
Recent Labs 10/10/18 
 0474 10 89 86  10/09/18 
 8213 WBC  12.1*  10.6 HGB  11.8*  12.0*  
HCT  37.9  36.4*  
PLT  66*  82* Recent Labs 10/10/18 
 0235  10/09/18 
 1544  10/09/18 
 0357   10/08/18 
 0401 NA  151*  157*  161*   < >  169*  
K  4.7  4.5  4.4   < >  4.1 CL  122*  125*  129*   < >  136* CO2  22  23  23   < >  21 BUN  65*  65*  78*   < >  68* CREA  4.60*  4.85*  5.79*   < >  6.12* GLU  322*  253*  400*   < >  206* CA  8.0*  8.0*  7.6*   < >  8.2* MG  2.1   --   2.6*   --   2.7* PHOS  4.7   --   5.7*   --   4.1  
 < > = values in this interval not displayed. Recent Labs 10/10/18 
 0235  10/09/18 
 0357  10/08/18 
 0401  10/07/18 
 1002 SGOT  158*  161*   --    --   
AP  97  102   --    --   
TP  6.5  6.2*   --    --   
ALB  2.4*  2.3*   --    --   
GLOB  4.1*  3.9   --    --   
LPSE   --    --   309  114 No results for input(s): INR, PTP, APTT in the last 72 hours. No lab exists for component: INREXT, INREXT No results for input(s): FE, TIBC, PSAT, FERR in the last 72 hours. No results found for: FOL, RBCF No results for input(s): PH, PCO2, PO2 in the last 72 hours. Recent Labs 10/10/18 
 0235  10/09/18 
 0357  10/08/18 
 0401 CPK  5633*  7494*  25676* No components found for: [place] Lab Results Component Value Date/Time  Color YELLOW/STRAW 10/06/2018 05:12 AM  
 Appearance CLEAR 10/06/2018 05:12 AM  
 Specific gravity 1.028 10/06/2018 05:12 AM  
 pH (UA) 5.0 10/06/2018 05:12 AM  
 Protein 30 (A) 10/06/2018 05:12 AM  
 Glucose >1000 (A) 10/06/2018 05:12 AM  
 Ketone 15 (A) 10/06/2018 05:12 AM  
 Bilirubin NEGATIVE  10/06/2018 05:12 AM  
 Urobilinogen 0.2 10/06/2018 05:12 AM  
 Nitrites NEGATIVE  10/06/2018 05:12 AM  
 Leukocyte Esterase NEGATIVE  10/06/2018 05:12 AM  
 Epithelial cells FEW 10/06/2018 05:12 AM  
 Bacteria NEGATIVE  10/06/2018 05:12 AM  
 WBC 0-4 10/06/2018 05:12 AM  
 RBC 0-5 10/06/2018 05:12 AM  
 
 
MEDICATIONS: 
Current Facility-Administered Medications Medication Dose Route Frequency  0.45% sodium chloride infusion  225 mL/hr IntraVENous CONTINUOUS  
 insulin glargine (LANTUS) injection 35 Units  35 Units SubCUTAneous DAILY  influenza vaccine 2018-19 (6 mos+)(PF) (FLUARIX QUAD/FLULAVAL QUAD) injection 0.5 mL  0.5 mL IntraMUSCular PRIOR TO DISCHARGE  insulin lispro (HUMALOG) injection   SubCUTAneous AC&HS  sodium chloride (NS) flush 5-10 mL  5-10 mL IntraVENous Q8H  
 sodium chloride (NS) flush 5-10 mL  5-10 mL IntraVENous PRN  
 mupirocin (BACTROBAN) 2 % ointment   Both Nostrils BID

## 2018-10-10 NOTE — DIABETES MGMT
DTC Consult Note Please consider increasing Lantus to 45 units and increasing lispro with meals to 15 units. Noted pt given additional dose of lantus 35 units today, please watch for s/s of hypoglycemia. Pt will need the following prescriptions at discharge: 1. Florencio Microlet Lancets # 100 2. Contour Next strips # 50 Current hospital DM medication: Lantus 35 units, lispro correction  - normal sensitivity , lispro with meals 10 units Consult received for:  []             Assessment of home management 
              []      Medication Recommendations []             Meter/monitoring 
   []             Insulin instruction [x]             New diagnosis []             Outpatient education []             Insulin pump patient []             Insulin infusion 
   []             DKA/HHS Chart reviewed and initial evaluation complete on [facility]. Patient is a 50 y.o. male with newly dx DM. Pt shared that he is a  and feel comfortable identifying CHO, proteins, and fats in foods. Pt reports that he \"eats on the go\" and takes pre-prepared food from [place] for his meals. Pt drinks regular Powerade throughout the day, and reported \"so I have to change that now\". Assessed and instructed patient on the following:  
·  blood sugar goals, illness management, nutrition, referred to Diabetes Educator, site rotation, use of insulin pen and self-injection of insulin · Discussed difference between basal  And meal time insulin and importance in taking daily/as directed, discussed importance in establishing PCP and having f/u with PCP for review of BG and response to insulin · Demonstrated Encouraged the following:  
· dietary modifications: Use MyPlate method at meals, aim to have 3-4 servings of CHO with meals or 45 - 60 grams of CHO with meals plus lean protein and non-starchy vegetables, switch to powerade zero or avoid sugary beverages, regular blood sugar monitoring:  3x/daily before meals Provided patient with the following: [x]             Survival skills education materials [x]             Insulin education materials []             CHO counting education materials [x]             Outpatient DTC contact number 
             [x]             Glucometer Patient was able to give return demonstration of 
  [x]       Glucometer  - Provided Contour Next meter [x]       saline injection  
   with []     without [x]       assistance needed. [x]       Nurse to have patient self inject prior to discharge. Discussed with patient and/or family need for follow up appointment for diabetes management after discharge. Pt agreeable to Survial Skills education and set appointment for 10/19/18 at 2:00 pm.  
  
A1c:  
Lab Results Component Value Date/Time Hemoglobin A1c 13.1 (H) 10/06/2018 08:44 AM  
 
 
Recent Glucose Results: Lab Results Component Value Date/Time  (H) 10/10/2018 02:35 AM  
  (H) 10/09/2018 03:44 PM  
 GLUCPOC 410 (H) 10/10/2018 11:57 AM  
 GLUCPOC 335 (H) 10/10/2018 07:26 AM  
 GLUCPOC 421 (H) 10/09/2018 09:23 PM  
  
 
Lab Results Component Value Date/Time Creatinine 4.60 (H) 10/10/2018 02:35 AM  
 
Estimated Creatinine Clearance: 26.8 mL/min (based on Cr of 4.6). Active Orders Diet DIET DIABETIC CONSISTENT CARB Regular PO intake: Patient Vitals for the past 72 hrs: 
 % Diet Eaten 10/09/18 1633 75 % 10/08/18 1800 80 % 10/08/18 1300 80 % Will continue to follow as needed. Thank you. Angelica Graves RD [facility] Office: [phone]

## 2018-10-11 ENCOUNTER — APPOINTMENT (OUTPATIENT)
Dept: ULTRASOUND IMAGING | Age: 48
DRG: 637 | End: 2018-10-11
Attending: INTERNAL MEDICINE
Payer: COMMERCIAL

## 2018-10-11 LAB
ANION GAP SERPL CALC-SCNC: 8 MMOL/L (ref 5–15)
BACTERIA SPEC CULT: NORMAL
BASOPHILS # BLD: 0 K/UL (ref 0–0.1)
BASOPHILS NFR BLD: 0 % (ref 0–1)
BUN SERPL-MCNC: 43 MG/DL (ref 6–20)
BUN/CREAT SERPL: 13 (ref 12–20)
CALCIUM SERPL-MCNC: 8.5 MG/DL (ref 8.5–10.1)
CHLORIDE SERPL-SCNC: 118 MMOL/L (ref 97–108)
CK SERPL-CCNC: 2957 U/L (ref 39–308)
CO2 SERPL-SCNC: 20 MMOL/L (ref 21–32)
CREAT SERPL-MCNC: 3.27 MG/DL (ref 0.7–1.3)
DIFFERENTIAL METHOD BLD: ABNORMAL
EOSINOPHIL # BLD: 0.2 K/UL (ref 0–0.4)
EOSINOPHIL NFR BLD: 2 % (ref 0–7)
ERYTHROCYTE [DISTWIDTH] IN BLOOD BY AUTOMATED COUNT: 14.1 % (ref 11.5–14.5)
FIBRINOGEN PPP-MCNC: 445 MG/DL (ref 200–475)
FOLATE SERPL-MCNC: 8.5 NG/ML (ref 5–21)
GLUCOSE BLD STRIP.AUTO-MCNC: 197 MG/DL (ref 65–100)
GLUCOSE BLD STRIP.AUTO-MCNC: 261 MG/DL (ref 65–100)
GLUCOSE BLD STRIP.AUTO-MCNC: 272 MG/DL (ref 65–100)
GLUCOSE BLD STRIP.AUTO-MCNC: 309 MG/DL (ref 65–100)
GLUCOSE BLD STRIP.AUTO-MCNC: 314 MG/DL (ref 65–100)
GLUCOSE SERPL-MCNC: 303 MG/DL (ref 65–100)
HCT VFR BLD AUTO: 34.2 % (ref 36.6–50.3)
HGB BLD-MCNC: 11.3 G/DL (ref 12.1–17)
IMM GRANULOCYTES # BLD: 0.1 K/UL (ref 0–0.04)
IMM GRANULOCYTES NFR BLD AUTO: 1 % (ref 0–0.5)
LYMPHOCYTES # BLD: 1.9 K/UL (ref 0.8–3.5)
LYMPHOCYTES NFR BLD: 18 % (ref 12–49)
MCH RBC QN AUTO: 27.8 PG (ref 26–34)
MCHC RBC AUTO-ENTMCNC: 33 G/DL (ref 30–36.5)
MCV RBC AUTO: 84 FL (ref 80–99)
MONOCYTES # BLD: 0.9 K/UL (ref 0–1)
MONOCYTES NFR BLD: 9 % (ref 5–13)
NEUTS SEG # BLD: 7.5 K/UL (ref 1.8–8)
NEUTS SEG NFR BLD: 71 % (ref 32–75)
NRBC # BLD: 0 K/UL (ref 0–0.01)
NRBC BLD-RTO: 0 PER 100 WBC
PLATELET # BLD AUTO: 62 K/UL (ref 150–400)
PMV BLD AUTO: 12.4 FL (ref 8.9–12.9)
POTASSIUM SERPL-SCNC: 4.9 MMOL/L (ref 3.5–5.1)
RBC # BLD AUTO: 4.07 M/UL (ref 4.1–5.7)
SERVICE CMNT-IMP: ABNORMAL
SERVICE CMNT-IMP: NORMAL
SODIUM SERPL-SCNC: 146 MMOL/L (ref 136–145)
VIT B12 SERPL-MCNC: 1788 PG/ML (ref 193–986)
WBC # BLD AUTO: 10.5 K/UL (ref 4.1–11.1)

## 2018-10-11 PROCEDURE — 74011636637 HC RX REV CODE- 636/637: Performed by: INTERNAL MEDICINE

## 2018-10-11 PROCEDURE — 77030011256 HC DRSG MEPILEX <16IN NO BORD MOLN -A

## 2018-10-11 PROCEDURE — 85025 COMPLETE CBC W/AUTO DIFF WBC: CPT | Performed by: INTERNAL MEDICINE

## 2018-10-11 PROCEDURE — 85384 FIBRINOGEN ACTIVITY: CPT | Performed by: INTERNAL MEDICINE

## 2018-10-11 PROCEDURE — 80048 BASIC METABOLIC PNL TOTAL CA: CPT | Performed by: INTERNAL MEDICINE

## 2018-10-11 PROCEDURE — 74011250637 HC RX REV CODE- 250/637: Performed by: INTERNAL MEDICINE

## 2018-10-11 PROCEDURE — 65660000000 HC RM CCU STEPDOWN

## 2018-10-11 PROCEDURE — 36415 COLL VENOUS BLD VENIPUNCTURE: CPT | Performed by: INTERNAL MEDICINE

## 2018-10-11 PROCEDURE — 82746 ASSAY OF FOLIC ACID SERUM: CPT | Performed by: INTERNAL MEDICINE

## 2018-10-11 PROCEDURE — 76700 US EXAM ABDOM COMPLETE: CPT

## 2018-10-11 PROCEDURE — 82607 VITAMIN B-12: CPT | Performed by: INTERNAL MEDICINE

## 2018-10-11 PROCEDURE — 94760 N-INVAS EAR/PLS OXIMETRY 1: CPT

## 2018-10-11 PROCEDURE — 82550 ASSAY OF CK (CPK): CPT | Performed by: INTERNAL MEDICINE

## 2018-10-11 PROCEDURE — 82962 GLUCOSE BLOOD TEST: CPT

## 2018-10-11 PROCEDURE — 74011000258 HC RX REV CODE- 258: Performed by: INTERNAL MEDICINE

## 2018-10-11 RX ORDER — INSULIN GLARGINE 100 [IU]/ML
45 INJECTION, SOLUTION SUBCUTANEOUS
Status: DISCONTINUED | OUTPATIENT
Start: 2018-10-11 | End: 2018-10-12

## 2018-10-11 RX ADMIN — INSULIN LISPRO 7 UNITS: 100 INJECTION, SOLUTION INTRAVENOUS; SUBCUTANEOUS at 17:25

## 2018-10-11 RX ADMIN — INSULIN LISPRO 3 UNITS: 100 INJECTION, SOLUTION INTRAVENOUS; SUBCUTANEOUS at 23:10

## 2018-10-11 RX ADMIN — INSULIN GLARGINE 45 UNITS: 100 INJECTION, SOLUTION SUBCUTANEOUS at 23:11

## 2018-10-11 RX ADMIN — INSULIN HUMAN 45 UNITS: 100 INJECTION, SUSPENSION SUBCUTANEOUS at 08:15

## 2018-10-11 RX ADMIN — Medication 10 ML: at 21:30

## 2018-10-11 RX ADMIN — Medication 10 ML: at 17:26

## 2018-10-11 RX ADMIN — Medication 10 ML: at 21:29

## 2018-10-11 RX ADMIN — INSULIN LISPRO 15 UNITS: 100 INJECTION, SOLUTION INTRAVENOUS; SUBCUTANEOUS at 08:16

## 2018-10-11 RX ADMIN — INSULIN LISPRO 15 UNITS: 100 INJECTION, SOLUTION INTRAVENOUS; SUBCUTANEOUS at 17:25

## 2018-10-11 RX ADMIN — SODIUM CHLORIDE 225 ML/HR: 450 INJECTION, SOLUTION INTRAVENOUS at 06:42

## 2018-10-11 RX ADMIN — Medication 10 ML: at 06:42

## 2018-10-11 RX ADMIN — FAMOTIDINE 20 MG: 20 TABLET ORAL at 08:16

## 2018-10-11 RX ADMIN — INSULIN LISPRO 7 UNITS: 100 INJECTION, SOLUTION INTRAVENOUS; SUBCUTANEOUS at 08:15

## 2018-10-11 NOTE — PROGRESS NOTES
CM aknowledged consult for PCP. Patient requested a PCP at the [facility]. Per patients request, CM set patient up Hortencia Diaz NP at the [facility]. Alex Dominguez Ext [phone]

## 2018-10-11 NOTE — PROGRESS NOTES
Initial Nutrition Assessment: 
 
INTERVENTIONS/RECOMMENDATIONS:  
· Will further restrict carb allowance in diet order due to BG >200 (the kcal specified in order does not limit amount of kcal, this is only used as guide for allowed carbs) · Pt was provided with additional DM nutrition therapy material  
 
ASSESSMENT:  
Chart reviewed, medically noted for DKA/HHS and PETER due to rhabdomyolysis. Assessing pt due to LOS. Per chart review pt seems to be eating well and did not have significant weight loss PTA. DTC has been following pt and provided insulin and nutrition education yesterday. During visit I present pt with additional nutrition educational material.  
 
No past medical history on file. Diet Order: Consistent carb 
% Eaten:  Patient Vitals for the past 72 hrs: 
 % Diet Eaten 10/11/18 0911 100 % 10/09/18 1633 75 % 10/08/18 1800 80 % 10/08/18 1300 80 % Pertinent Medications: [x]Reviewed: pepcid, humalog, lantus Pertinent Labs: [x]Reviewed: BG >200 Food Allergies: [x]NKFA  []Other Last BM: 10/10 Edema:        []RUE   []LUE   []RLE   []LLE Pressure Injury:      [] Stage I   [] Stage II   [] Stage III   [] Stage IV Wt Readings from Last 30 Encounters:  
10/06/18 117.9 kg (260 lb) 10/01/18 108.5 kg (239 lb 3.2 oz) 12/25/16 117 kg (258 lb) 04/25/11 104.3 kg (230 lb) Anthropometrics:  
Height: 6' 2\" (188 cm) Weight: 117.9 kg (260 lb) IBW (%IBW):   ( ) UBW (%UBW):   (  %) Last Weight Metrics: 
Weight Loss Metrics 10/6/2018 10/1/2018 12/25/2016 4/25/2011 Today's Wt 260 lb 239 lb 3.2 oz 258 lb 230 lb BMI 33.38 kg/m2 34.32 kg/m2 35.98 kg/m2 32.09 kg/m2 BMI: Body mass index is 33.38 kg/(m^2). This BMI is indicative of: 
 []Underweight    []Normal    []Overweight    [x] Obesity   [] Extreme Obesity (BMI>40) Estimated Nutrition Needs (Based on):  
2300 Kcals/day (BMR: 2100 x 1.1) , 95 g (0.8 g/kg) Protein Carbohydrate: At Least 130 g/day  Fluids: 2300 mL/day (1ml/kcal) or per primary team 
 
NUTRITION DIAGNOSES:  
Problem:  Altered nutrition-related lab values Etiology: related to uncontrolled DM Signs/Symptoms: as evidenced by BG >200 NUTRITION INTERVENTIONS: 
Meals/Snacks: General/healthful diet, Modify diet/texture/consistency/nutrients         Initial/Brief Nutrition Education: Purpose of nutrition education, Survival information GOAL:  
stabilize BG <180 mg/dL in 5-7 days LEARNING NEEDS (Diet, Food/Nutrient-Drug Interaction):  
 [x] None Identified 
 [] Identified and Education Provided/Documented 
 [] Identified and Pt declined/was not appropriate Cultureal, Yazidism, OR Ethnic Dietary Needs:  
 [x] None Identified 
 [] Identified and Addressed 
 
 [x] Interdisciplinary Care Plan Reviewed/Documented  
 [x] Discharge Planning:   Consistent carb diet (45-60 g CHO per meal) MONITORING Sarahy Bush Outcomes: Food/nutrition knowledge Food/Nutrient Intake Outcomes: Total energy intake Physical Signs/Symptoms Outcomes: Weight/weight change, Electrolyte and renal profile, Glucose profile, GI 
 
NUTRITION RISK:  
 [] High              [] Moderate           [x]  Low  []  Minimal/Uncompromised PT SEEN FOR:  
 []  MD Consult: []Calorie Count []Diabetic Diet Education []Diet Education []Electrolyte Management []General Nutrition Management and Supplements []Management of Tube Feeding []TPN Recommendations []  RN Referral:  []MST score >=2 
   []Enteral/Parenteral Nutrition PTA []Pregnant: Gestational DM or Multigestation 
   []Pressure Ulcer/Wound Care needs 
     
[]  Low BMI [x]  LOS Referral  
 
 
Eugune Halsted, RDN Pager [phone] Weekend Pager [phone]

## 2018-10-11 NOTE — PROGRESS NOTES
Problem: Falls - Risk of 
Goal: *Absence of Falls Document Aline Pfeiffer Fall Risk and appropriate interventions in the flowsheet. Outcome: Progressing Towards Goal 
Fall Risk Interventions: 
Mobility Interventions: Bed/chair exit alarm, Communicate number of staff needed for ambulation/transfer, Mechanical lift, OT consult for ADLs, PT Consult for mobility concerns Mentation Interventions: Adequate sleep, hydration, pain control, Bed/chair exit alarm, Door open when patient unattended, Evaluate medications/consider consulting pharmacy, Eyeglasses and hearing aids, Familiar objects from home, Family/sitter at bedside, Reorient patient, Room close to nurse's station, Self-releasing belt, Toileting rounds, Update white board Medication Interventions: Patient to call before getting OOB, Evaluate medications/consider consulting pharmacy Elimination Interventions: Call light in reach, Elevated toilet seat, Patient to call for help with toileting needs, Toilet paper/wipes in reach, Toileting schedule/hourly rounds, Urinal in reach History of Falls Interventions: Door open when patient unattended, Evaluate medications/consider consulting pharmacy, Investigate reason for fall, Room close to nurse's station Problem: Pressure Injury - Risk of 
Goal: *Prevention of pressure injury Document Laan Scale and appropriate interventions in the flowsheet. Outcome: Progressing Towards Goal 
Pressure Injury Interventions: 
Sensory Interventions: Assess changes in LOC, Assess need for specialty bed, Avoid rigorous massage over bony prominences, Chair cushion, Check visual cues for pain, Discuss PT/OT consult with provider, Float heels, Keep linens dry and wrinkle-free Moisture Interventions: Absorbent underpads, Apply protective barrier, creams and emollients, Assess need for specialty bed, Check for incontinence Q2 hours and as needed, Contain wound drainage, Internal/External fecal devices, Internal/External urinary devices, Limit adult briefs Activity Interventions: Increase time out of bed Mobility Interventions: Assess need for specialty bed, Chair cushion, Float heels, HOB 30 degrees or less Nutrition Interventions: Document food/fluid/supplement intake Friction and Shear Interventions: Apply protective barrier, creams and emollients, Feet elevated on foot rest, Foam dressings/transparent film/skin sealants, HOB 30 degrees or less, Lift sheet, Lift team/patient mobility team, Minimize layers, Sit at 90-degree angle

## 2018-10-11 NOTE — PROGRESS NOTES
Hematology Oncology Progress Note Follow up for: Thrombocytopenia Chart notes reviewed since last visit. Case discussed with following: 
 
Patient complains of the following:No complaints Additional concerns noted by the staff:  
 
Patient Vitals for the past 24 hrs: 
 BP Temp Pulse Resp SpO2  
10/11/18 0717 126/73 98.7 °F (37.1 °C) 95 18 100 % 10/11/18 0417 114/64 97.7 °F (36.5 °C) 75 17 99 % 10/10/18 2312 114/71 98.3 °F (36.8 °C) 82 18 100 % 10/10/18 2005 108/72 98.1 °F (36.7 °C) (!) 103 18 100 % 10/10/18 1627 110/57 98.2 °F (36.8 °C) 99 18 100 % 10/10/18 1153 124/83 98.3 °F (36.8 °C) 86 18 100 % Review of Systems: 
12 point ROS done and negative except as above Physical Examination: 
Gen NAD 
CV reg Lungs clear Abd benign Labs: 
Recent Results (from the past 24 hour(s)) RETICULOCYTE COUNT Collection Time: 10/10/18 11:18 AM  
Result Value Ref Range Reticulocyte count 0.4 (L) 0.7 - 2.1 % Absolute Retic Cnt. 0.0154 (L) 0.0260 - 0.0950 M/ul PTT Collection Time: 10/10/18 11:18 AM  
Result Value Ref Range aPTT 23.4 22.1 - 32.0 sec  
 aPTT, therapeutic range     58.0 - 77.0 SECS  
PROTHROMBIN TIME + INR Collection Time: 10/10/18 11:18 AM  
Result Value Ref Range INR 1.0 0.9 - 1.1 Prothrombin time 10.7 9.0 - 11.1 sec FIBRINOGEN Collection Time: 10/10/18 11:18 AM  
Result Value Ref Range Fibrinogen 406 200 - 475 mg/dL GLUCOSE, POC Collection Time: 10/10/18 11:57 AM  
Result Value Ref Range Glucose (POC) 410 (H) 65 - 100 mg/dL Performed by Gunjan Mehta (PCT) GLUCOSE, POC Collection Time: 10/10/18  4:32 PM  
Result Value Ref Range Glucose (POC) 342 (H) 65 - 100 mg/dL Performed by Allison Mares GLUCOSE, POC Collection Time: 10/10/18  8:40 PM  
Result Value Ref Range Glucose (POC) 299 (H) 65 - 100 mg/dL Performed by Allison Mares CBC WITH AUTOMATED DIFF  Collection Time: 10/11/18  4:39 AM  
 Result Value Ref Range WBC 10.5 4.1 - 11.1 K/uL  
 RBC 4.07 (L) 4.10 - 5.70 M/uL  
 HGB 11.3 (L) 12.1 - 17.0 g/dL HCT 34.2 (L) 36.6 - 50.3 % MCV 84.0 80.0 - 99.0 FL  
 MCH 27.8 26.0 - 34.0 PG  
 MCHC 33.0 30.0 - 36.5 g/dL  
 RDW 14.1 11.5 - 14.5 % PLATELET 62 (L) 447 - 400 K/uL MPV 12.4 8.9 - 12.9 FL  
 NRBC 0.0 0  WBC ABSOLUTE NRBC 0.00 0.00 - 0.01 K/uL NEUTROPHILS 71 32 - 75 % LYMPHOCYTES 18 12 - 49 % MONOCYTES 9 5 - 13 % EOSINOPHILS 2 0 - 7 % BASOPHILS 0 0 - 1 % IMMATURE GRANULOCYTES 1 (H) 0.0 - 0.5 % ABS. NEUTROPHILS 7.5 1.8 - 8.0 K/UL  
 ABS. LYMPHOCYTES 1.9 0.8 - 3.5 K/UL  
 ABS. MONOCYTES 0.9 0.0 - 1.0 K/UL  
 ABS. EOSINOPHILS 0.2 0.0 - 0.4 K/UL  
 ABS. BASOPHILS 0.0 0.0 - 0.1 K/UL  
 ABS. IMM. GRANS. 0.1 (H) 0.00 - 0.04 K/UL  
 DF AUTOMATED METABOLIC PANEL, BASIC Collection Time: 10/11/18  4:39 AM  
Result Value Ref Range Sodium 146 (H) 136 - 145 mmol/L Potassium 4.9 3.5 - 5.1 mmol/L Chloride 118 (H) 97 - 108 mmol/L  
 CO2 20 (L) 21 - 32 mmol/L Anion gap 8 5 - 15 mmol/L Glucose 303 (H) 65 - 100 mg/dL BUN 43 (H) 6 - 20 MG/DL Creatinine 3.27 (H) 0.70 - 1.30 MG/DL  
 BUN/Creatinine ratio 13 12 - 20 GFR est AA 25 (L) >60 ml/min/1.73m2 GFR est non-AA 20 (L) >60 ml/min/1.73m2 Calcium 8.5 8.5 - 10.1 MG/DL  
CK Collection Time: 10/11/18  4:39 AM  
Result Value Ref Range CK 2957 (H) 39 - 308 U/L  
FOLATE Collection Time: 10/11/18  4:39 AM  
Result Value Ref Range Folate 8.5 5.0 - 21.0 ng/mL VITAMIN B12 Collection Time: 10/11/18  4:39 AM  
Result Value Ref Range Vitamin B12 1788 (H) 193 - 986 pg/mL FIBRINOGEN Collection Time: 10/11/18  4:39 AM  
Result Value Ref Range Fibrinogen 445 200 - 475 mg/dL GLUCOSE, POC Collection Time: 10/11/18  7:20 AM  
Result Value Ref Range Glucose (POC) 309 (H) 65 - 100 mg/dL Performed by Elizabet Quintana Assessment and Plan: Thrombocytopenia -No sign of hemolysis, DIC, smear showed no schistocytes, HIT-ab pending but do not think this is HIT 
-PLTs stable 
-Will get Abd US to look for splenomegaly Rhabdo 
-improving 
-plan per primary team

## 2018-10-11 NOTE — PROGRESS NOTES
Bedside shift change report given to Adele Arechiga RN (oncoming nurse) by [facility] RN (offgoing nurse). Report included the following information SBAR.

## 2018-10-11 NOTE — PROGRESS NOTES
Hospitalist Progress Note NAME: Denis Bhakta  
:  1970 MRN:  [id number] Assessment / Plan: 
DKA/HHS: 
-s/p insulin gtt 
-Continue on Lantus 45 units qhs and Humalog 15 units TID meals with SSI (recently up titrated, received NPH this am so Lantus can be started on a qhs schedule) -I had a prolonged discussion with patient and family today about insulin with emphasis on putting in to context his ACHS POC testing with his current basal/bolus regimen. -RN has been providing instruction on insulin administration Metabolic Encephalopathy: resovled PETER: secondary to dehydration and rhabdomyolysis-renal function slowly improving Severe Hypernatremia: 
-IVF's per Nephrology, IVF's stopped, if renal function improving tomorrow would be okay for discharge from Renal perspective; case discussed with Dr. Mary Seo this am 
-CK improving 
-blakely and femoral CVC removed on 10/9 Hypokalemia: resolved Hypophosphatemia now with Hyperphosphatemia Hypermagnesemia: 
-monitor electrolytes Leukocytosis most likely reactive: resolved BCx, NG5d Empiric IV antibiotic therapy discontinued in ICU by Intensivest team 
 
Worsening Thrombocytopenia: 
-discontinue sq heparin and monitor -appreciate Hematology evaluation 
-Abdominal ultrasound without splenomegaly Obesity: Body mass index is 33.38 kg/(m^2). 
  
Code status: Full Prophylaxis: SCD's started Recommended Disposition: TBD Subjective: Chief Complaint / Reason for Physician Visit: follow-up DKA and PETER Patient seen for follow-up Denies complaints Off of IVF's Tolerating PO Review of Systems: 
Symptom Y/N Comments  Symptom Y/N Comments Fever/Chills    Chest Pain n   
Poor Appetite    Edema Cough    Abdominal Pain n   
Sputum    Joint Pain SOB/HERNANDEZ n   Pruritis/Rash Nausea/vomit    Tolerating PT/OT Diarrhea    Tolerating Diet y Constipation    Other Could NOT obtain due to:   
 
Objective: VITALS:  
 Last 24hrs VS reviewed since prior progress note. Most recent are: 
Patient Vitals for the past 24 hrs: 
 Temp Pulse Resp BP SpO2  
10/11/18 1133 98.2 °F (36.8 °C) 86 16 112/77 100 % 10/11/18 0717 98.7 °F (37.1 °C) 95 18 126/73 100 % 10/11/18 0417 97.7 °F (36.5 °C) 75 17 114/64 99 % 10/10/18 2312 98.3 °F (36.8 °C) 82 18 114/71 100 % 10/10/18 2005 98.1 °F (36.7 °C) (!) 103 18 108/72 100 % 10/10/18 1627 98.2 °F (36.8 °C) 99 18 110/57 100 % Intake/Output Summary (Last 24 hours) at 10/11/18 1336 Last data filed at 10/11/18 5478 Gross per 24 hour Intake          5758.75 ml Output             1100 ml Net          4658.75 ml PHYSICAL EXAM: 
General: Alert, cooperative, NAD  
EENT:  EOMI. Anicteric sclerae. MMM Resp:  CTA bilaterally, no wheezing or rales. No accessory muscle use CV:  RRR, no murmur,  trace BLE edema GI:  Soft, Non distended, Non tender.  +Bowel sounds Neurologic:  Alert and oriented X 3, Moves all extremities, normal speech Psych:   Fair insight, not anxious or agitated Skin:  No rashes. No jaundice Reviewed most current lab test results and cultures  YES Reviewed most current radiology test results   YES Review and summation of old records today    NO Reviewed patient's current orders and MAR    YES 
PMH/ reviewed - no change compared to H&P 
________________________________________________________________________ Care Plan discussed with: 
  Comments Patient x Family  x   
RN x Care Manager x Message left Consultant  x Dr. Juarez Manual Multidiciplinary team rounds were held today with , nursing, pharmacist and clinical coordinator. Patient's plan of care was discussed; medications were reviewed and discharge planning was addressed. ________________________________________________________________________ Total NON critical care TIME:  35   Minutes Total CRITICAL CARE TIME Spent:   Minutes non procedure based Comments >50% of visit spent in counseling and coordination of care x Complete discussion about BG management and insulin therapy  
________________________________________________________________________ Barbara Luu MD  
 
Procedures: see electronic medical records for all procedures/Xrays and details which were not copied into this note but were reviewed prior to creation of Plan. LABS: 
I reviewed today's most current labs and imaging studies. Pertinent labs include: 
Recent Labs 10/11/18 
 6811  10/10/18 
 0235  10/09/18 
 6784 WBC  10.5  12.1*  10.6 HGB  11.3*  11.8*  12.0*  
HCT  34.2*  37.9  36.4*  
PLT  62*  66*  82* Recent Labs 10/11/18 
 0439  10/10/18 22 [id number]  10/10/18 
 0235  10/09/18 
 1544  10/09/18 
 0357 NA  146*   --   151*  157*  161* K  4.9   --   4.7  4.5  4.4  
CL  118*   --   122*  125*  129* CO2  20*   --   22  23  23 GLU  303*   --   322*  253*  400* BUN  43*   --   65*  65*  78* CREA  3.27*   --   4.60*  4.85*  5.79* CA  8.5   --   8.0*  8.0*  7.6*  
MG   --    --   2.1   --   2.6* PHOS   --    --   4.7   --   5.7* ALB   --    --   2.4*   --   2.3* TBILI   --    --   0.5   --   0.4 SGOT   --    --   158*   --   161* ALT   --    --   151*   --   135* INR   --   1.0   --    --    --   
 
 
Signed: Barbara Luu MD

## 2018-10-11 NOTE — PROGRESS NOTES
Bedside shift change report given to CHANDNI Lyle (oncoming nurse). Report included the following information SBAR. SHIFT SUMMARY: 
 
 
 
 
 
[address] NURSING NOTE Admission Date 10/6/2018 Admission Diagnosis [facility] (diabetic ketoacidoses) [facility] Consults IP CONSULT TO NEPHROLOGY 
IP CONSULT TO HEMATOLOGY Cardiac Monitoring [x] Yes [] No  
  
Purposeful Hourly Rounding [x] Yes   
Shalom Score Total Score: 2 Shalom score 3 or > [] Bed Alarm [] Avasys [] 1:1 sitter [] Patient refused (Signed refusal form in chart) Alan Score Alan Score: 22 Alan score 14 or < [] PMT consult [] Wound Care consult  
 []  Specialty bed  [] Nutrition consult Influenza Vaccine Received Flu Vaccine for Current Season (usually Sept-March): No  
 Patient/Guardian Refused (Notify MD): No  
  
Oxygen needs? [x] Room air Oxygen @  []1L    []2L    []3L   []4L    []5L   []6L via NC Chronic home O2 use? [] Yes [] No 
Perform O2 challenge test and document in progress note using smartphrase (.Homeoxygen) Last bowel movement Last Bowel Movement Date: 10/10/18 Urinary Catheter [REMOVED] Condom Catheter 10/06/18-Indications for Use: Accurate measurement of urinary output [REMOVED] Urinary Catheter 10/06/18 Maldonado - Temperature-Indications for Use: Accurate measurement of urinary output [REMOVED] Condom Catheter 10/06/18-Urine Output (mL): 580 ml [REMOVED] Urinary Catheter 10/06/18 Maldonado - Temperature-Urine Output (mL): 375 ml LDAs Peripheral IV 10/10/18 Right Forearm (Active) Site Assessment Clean, dry, & intact 10/11/2018  3:55 PM  
Phlebitis Assessment 0 10/11/2018  3:55 PM  
Infiltration Assessment 0 10/11/2018  3:55 PM  
Dressing Status Clean, dry, & intact 10/11/2018  3:55 PM  
Dressing Type Transparent;Tape 10/11/2018  3:55 PM  
Hub Color/Line Status Blue;Flushed 10/11/2018  3:55 PM  
                  
  
Readmission Risk Assessment Tool Score Low Risk   
      
 4 Total Score 3 Patient Length of Stay (>5 days = 3) 1 Charlson Comorbidity Score (Age + Comorbid Conditions) Criteria that do not apply:  
 Has Seen PCP in Last 6 Months (Yes=3, No=0) . Living with Significant Other. Assisted Living. LTAC. SNF. or  
Rehab  
 IP Visits Last 12 Months (1-3=4, 4=9, >4=11) Pt. Coverage (Medicare=5 , Medicaid, or Self-Pay=4) Expected Length of Stay 3d 21h Actual Length of Stay 5

## 2018-10-11 NOTE — PROGRESS NOTES
NAME: Balbir Perez  
     :  1970 MRN:  [id number] Assessment :    Plan: 
--PETER 
rhabdo DKA Severe hypernatremia AMS 
thrombocytopenia --Creatinine improved (peaked at 6.1; now 4.6 to 3.3). Great uop. NML renal U/S. CK better 09596 to 7400 to 5600 to 3000. Sodium improving (now 146). D/c ivf's and see how he does with just PO intake - if labs better again tomorrow then ok for discharge. Subjective: Chief Complaint:  Alert. No sob. No cp. No n/v/d. Tolerating po. We discussed the above. Review of Systems: 
 
Symptom Y/N Comments  Symptom Y/N Comments Fever/Chills    Chest Pain n   
Poor Appetite n   Edema n   
Cough    Abdominal Pain n   
Sputum    Joint Pain SOB/HERNANDEZ n   Pruritis/Rash Nausea/vomit n   Tolerating PT/OT Diarrhea    Tolerating Diet Constipation    Other Could not obtain due to:   
 
Objective: VITALS:  
Last 24hrs VS reviewed since prior progress note. Most recent are: 
Visit Vitals  /64 (BP 1 Location: Left arm)  Pulse 75  Temp 97.7 °F (36.5 °C)  Resp 17  Ht 6' 2\" (1.88 m)  Wt 117.9 kg (260 lb)  SpO2 99%  BMI 33.38 kg/m2 Intake/Output Summary (Last 24 hours) at 10/11/18 0715 Last data filed at 10/11/18 2736 Gross per 24 hour Intake          5558.75 ml Output              600 ml Net          4958.75 ml Telemetry Reviewed: PHYSICAL EXAM: 
General: NAD 
CTA 
RRR 
abd soft No edema Lab Data Reviewed: (see below) Medications Reviewed: (see below) PMH/SH reviewed - no change compared to H&P 
________________________________________________________________________ Care Plan discussed with: 
Patient y Family RN y   
Care Manager Consultant:     
 
  Comments >50% of visit spent in counseling and coordination of care ________________________________________________________________________ Ann Marie Rosa MD  
 
Procedures: see electronic medical records for all procedures/Xrays and details which 
were not copied into this note but were reviewed prior to creation of Plan. LABS: 
Recent Labs 10/11/18 
 0727  10/10/18 
 0235 WBC  10.5  12.1* HGB  11.3*  11.8* HCT  34.2*  37.9 PLT  62*  66* Recent Labs 10/11/18 
 7781  10/10/18 
 0235  10/09/18 
 1544  10/09/18 
 7780 NA  146*  151*  157*  161* K  4.9  4.7  4.5  4.4  
CL  118*  122*  125*  129* CO2  20*  22  23  23 BUN  43*  65*  65*  78* CREA  3.27*  4.60*  4.85*  5.79* GLU  303*  322*  253*  400* CA  8.5  8.0*  8.0*  7.6*  
MG   --   2.1   --   2.6* PHOS   --   4.7   --   5.7* Recent Labs 10/10/18 
 0474 10 89 86  10/09/18 
 2315 SGOT  158*  161* AP  97  102 TP  6.5  6.2* ALB  2.4*  2.3*  
GLOB  4.1*  3.9 Recent Labs 10/10/18 6 [facility] INR  1.0 PTP  10.7 APTT  23.4 No results for input(s): FE, TIBC, PSAT, FERR in the last 72 hours. No results found for: FOL, RBCF No results for input(s): PH, PCO2, PO2 in the last 72 hours. Recent Labs 10/11/18 
 3105  10/10/18 
 0235  10/09/18 
 0882 CPK  2957*  5633*  7417* No components found for: [place] Lab Results Component Value Date/Time  Color YELLOW/STRAW 10/06/2018 05:12 AM  
 Appearance CLEAR 10/06/2018 05:12 AM  
 Specific gravity 1.028 10/06/2018 05:12 AM  
 pH (UA) 5.0 10/06/2018 05:12 AM  
 Protein 30 (A) 10/06/2018 05:12 AM  
 Glucose >1000 (A) 10/06/2018 05:12 AM  
 Ketone 15 (A) 10/06/2018 05:12 AM  
 Bilirubin NEGATIVE  10/06/2018 05:12 AM  
 Urobilinogen 0.2 10/06/2018 05:12 AM  
 Nitrites NEGATIVE  10/06/2018 05:12 AM  
 Leukocyte Esterase NEGATIVE  10/06/2018 05:12 AM  
 Epithelial cells FEW 10/06/2018 05:12 AM  
 Bacteria NEGATIVE  10/06/2018 05:12 AM  
 WBC 0-4 10/06/2018 05:12 AM  
 RBC 0-5 10/06/2018 05:12 AM  
 
 
MEDICATIONS: 
 Current Facility-Administered Medications Medication Dose Route Frequency  famotidine (PEPCID) tablet 20 mg  20 mg Oral DAILY  insulin glargine (LANTUS) injection 45 Units  45 Units SubCUTAneous DAILY  insulin lispro (HUMALOG) injection 15 Units  15 Units SubCUTAneous TIDAC  
 0.45% sodium chloride infusion  225 mL/hr IntraVENous CONTINUOUS  
 insulin lispro (HUMALOG) injection   SubCUTAneous AC&HS  sodium chloride (NS) flush 5-10 mL  5-10 mL IntraVENous Q8H  
 sodium chloride (NS) flush 5-10 mL  5-10 mL IntraVENous PRN

## 2018-10-12 VITALS
BODY MASS INDEX: 33.37 KG/M2 | HEART RATE: 90 BPM | RESPIRATION RATE: 20 BRPM | SYSTOLIC BLOOD PRESSURE: 126 MMHG | OXYGEN SATURATION: 100 % | TEMPERATURE: 98.2 F | WEIGHT: 260 LBS | DIASTOLIC BLOOD PRESSURE: 80 MMHG | HEIGHT: 74 IN

## 2018-10-12 LAB
ANION GAP SERPL CALC-SCNC: 7 MMOL/L (ref 5–15)
BASOPHILS # BLD: 0 K/UL (ref 0–0.1)
BASOPHILS NFR BLD: 0 % (ref 0–1)
BUN SERPL-MCNC: 40 MG/DL (ref 6–20)
BUN/CREAT SERPL: 13 (ref 12–20)
CALCIUM SERPL-MCNC: 8.5 MG/DL (ref 8.5–10.1)
CHLORIDE SERPL-SCNC: 117 MMOL/L (ref 97–108)
CK SERPL-CCNC: 1415 U/L (ref 39–308)
CO2 SERPL-SCNC: 23 MMOL/L (ref 21–32)
CREAT SERPL-MCNC: 2.98 MG/DL (ref 0.7–1.3)
DIFFERENTIAL METHOD BLD: ABNORMAL
EOSINOPHIL # BLD: 0.2 K/UL (ref 0–0.4)
EOSINOPHIL NFR BLD: 2 % (ref 0–7)
ERYTHROCYTE [DISTWIDTH] IN BLOOD BY AUTOMATED COUNT: 13.6 % (ref 11.5–14.5)
GLUCOSE BLD STRIP.AUTO-MCNC: 236 MG/DL (ref 65–100)
GLUCOSE BLD STRIP.AUTO-MCNC: 264 MG/DL (ref 65–100)
GLUCOSE SERPL-MCNC: 241 MG/DL (ref 65–100)
HCT VFR BLD AUTO: 32.4 % (ref 36.6–50.3)
HGB BLD-MCNC: 10.5 G/DL (ref 12.1–17)
IMM GRANULOCYTES # BLD: 0.1 K/UL (ref 0–0.04)
IMM GRANULOCYTES NFR BLD AUTO: 1 % (ref 0–0.5)
LYMPHOCYTES # BLD: 1.8 K/UL (ref 0.8–3.5)
LYMPHOCYTES NFR BLD: 20 % (ref 12–49)
MCH RBC QN AUTO: 27.8 PG (ref 26–34)
MCHC RBC AUTO-ENTMCNC: 32.4 G/DL (ref 30–36.5)
MCV RBC AUTO: 85.7 FL (ref 80–99)
MONOCYTES # BLD: 1.2 K/UL (ref 0–1)
MONOCYTES NFR BLD: 13 % (ref 5–13)
NEUTS SEG # BLD: 6 K/UL (ref 1.8–8)
NEUTS SEG NFR BLD: 65 % (ref 32–75)
NRBC # BLD: 0 K/UL (ref 0–0.01)
NRBC BLD-RTO: 0 PER 100 WBC
PF4 HEPARIN CMPLX AB SER-ACNC: 0.17 OD (ref 0–0.4)
PLATELET # BLD AUTO: 80 K/UL (ref 150–400)
PMV BLD AUTO: 12.4 FL (ref 8.9–12.9)
POTASSIUM SERPL-SCNC: 4.5 MMOL/L (ref 3.5–5.1)
RBC # BLD AUTO: 3.78 M/UL (ref 4.1–5.7)
SERVICE CMNT-IMP: ABNORMAL
SERVICE CMNT-IMP: ABNORMAL
SODIUM SERPL-SCNC: 147 MMOL/L (ref 136–145)
SRA 100IU/ML UFH SER-ACNC: <1 % (ref 0–20)
SRA UFH SER-IMP: NORMAL
UNFRAC HEPARIN LOW DOSE: <1 % (ref 0–20)
WBC # BLD AUTO: 9.3 K/UL (ref 4.1–11.1)

## 2018-10-12 PROCEDURE — 74011250637 HC RX REV CODE- 250/637: Performed by: INTERNAL MEDICINE

## 2018-10-12 PROCEDURE — 80048 BASIC METABOLIC PNL TOTAL CA: CPT | Performed by: INTERNAL MEDICINE

## 2018-10-12 PROCEDURE — 74011636637 HC RX REV CODE- 636/637: Performed by: INTERNAL MEDICINE

## 2018-10-12 PROCEDURE — 36415 COLL VENOUS BLD VENIPUNCTURE: CPT | Performed by: INTERNAL MEDICINE

## 2018-10-12 PROCEDURE — 85025 COMPLETE CBC W/AUTO DIFF WBC: CPT | Performed by: INTERNAL MEDICINE

## 2018-10-12 PROCEDURE — 82962 GLUCOSE BLOOD TEST: CPT

## 2018-10-12 PROCEDURE — 82550 ASSAY OF CK (CPK): CPT | Performed by: INTERNAL MEDICINE

## 2018-10-12 RX ORDER — INSULIN GLARGINE 100 [IU]/ML
INJECTION, SOLUTION SUBCUTANEOUS
Qty: 6 ADJUSTABLE DOSE PRE-FILLED PEN SYRINGE | Refills: 1 | Status: SHIPPED | OUTPATIENT
Start: 2018-10-12

## 2018-10-12 RX ORDER — INSULIN LISPRO 100 [IU]/ML
INJECTION, SOLUTION INTRAVENOUS; SUBCUTANEOUS
Qty: 2 PACKAGE | Refills: 1 | Status: SHIPPED | OUTPATIENT
Start: 2018-10-12

## 2018-10-12 RX ORDER — INSULIN GLARGINE 100 [IU]/ML
55 INJECTION, SOLUTION SUBCUTANEOUS
Status: DISCONTINUED | OUTPATIENT
Start: 2018-10-12 | End: 2018-10-12 | Stop reason: HOSPADM

## 2018-10-12 RX ORDER — INSULIN LISPRO 100 [IU]/ML
15 INJECTION, SOLUTION INTRAVENOUS; SUBCUTANEOUS
Status: DISCONTINUED | OUTPATIENT
Start: 2018-10-12 | End: 2018-10-12 | Stop reason: HOSPADM

## 2018-10-12 RX ORDER — INSULIN LISPRO 100 [IU]/ML
18 INJECTION, SOLUTION INTRAVENOUS; SUBCUTANEOUS
Status: DISCONTINUED | OUTPATIENT
Start: 2018-10-12 | End: 2018-10-12

## 2018-10-12 RX ADMIN — INSULIN LISPRO 15 UNITS: 100 INJECTION, SOLUTION INTRAVENOUS; SUBCUTANEOUS at 12:46

## 2018-10-12 RX ADMIN — INSULIN LISPRO 3 UNITS: 100 INJECTION, SOLUTION INTRAVENOUS; SUBCUTANEOUS at 09:09

## 2018-10-12 RX ADMIN — INSULIN HUMAN 10 UNITS: 100 INJECTION, SUSPENSION SUBCUTANEOUS at 09:09

## 2018-10-12 RX ADMIN — INSULIN LISPRO 5 UNITS: 100 INJECTION, SOLUTION INTRAVENOUS; SUBCUTANEOUS at 12:48

## 2018-10-12 RX ADMIN — Medication 10 ML: at 06:00

## 2018-10-12 RX ADMIN — FAMOTIDINE 20 MG: 20 TABLET ORAL at 09:08

## 2018-10-12 NOTE — DIABETES MGMT
DTC Consult Note Pt will need the following prescriptions at discharge: 1. Florencio Microlet Lancets # 100 2. Contour Next strips # 50 Dr. Regan Loza for prescriptions. Current hospital DM medication: Lantus 55 units, lispro correction  - normal sensitivity , lispro with meals 15 units Met with pt again at his request to review using his glucometer. Pt was able to provide a return demo with some assistance. BG was 255. Demonstrated for pt how to use the insulin pen again. He verbalized understanding. Noted pt to be discharged on a correction scale in addition to his basal/ bolus regimen. Reviewed with pt which insulin to take when and how to use a correction scale. Pt denied any further questions. Consult received for:   
   [x]             New diagnosis Chart reviewed and initial evaluation complete on [facility]. Patient is a 50 y.o. male with newly dx DM. Provided patient with the following: [x]             Survival skills education materials [x]             Insulin education materials []             CHO counting education materials [x]             Outpatient DTC contact number 
             [x]             Glucometer Patient was able to give return demonstration of 
  [x]       Glucometer  - Provided Contour Next meter [x]       saline injection  
   with []     without [x]       assistance needed. [x]       Nurse to have patient self inject prior to discharge. Discussed with patient and/or family need for follow up appointment for diabetes management after discharge. Pt agreeable to Survial Skills education and set appointment for 10/19/18 at 2:00 pm.  
  
A1c:  
Lab Results Component Value Date/Time Hemoglobin A1c 13.1 (H) 10/06/2018 08:44 AM  
 
 
Recent Glucose Results:  
Lab Results Component Value Date/Time   (H) 10/12/2018 05:13 AM  
 GLUCPOC 264 (H) 10/12/2018 11:29 AM  
 GLUCPOC 236 (H) 10/12/2018 07:10 AM  
 GLUCPOC 272 (H) 10/11/2018 10:57 PM  
  
 
Lab Results Component Value Date/Time Creatinine 2.98 (H) 10/12/2018 05:13 AM  
 
Estimated Creatinine Clearance: 41.4 mL/min (based on Cr of 2.98). Active Orders Diet DIET DIABETIC WITH OPTIONS Consistent Carb 1500-1600kcal; Regular PO intake:  
Patient Vitals for the past 72 hrs: 
 % Diet Eaten 10/12/18 1132 50 % 10/11/18 1555 75 % 10/11/18 0911 100 % 10/09/18 1633 75 % Will continue to follow as needed. Thank you. Raven Mitchell, BSN, RN, CDE [facility]

## 2018-10-12 NOTE — DISCHARGE SUMMARY
Hospitalist Discharge Summary Patient ID: Harjit Constantino 
[id number] 
75 y.o. 
1970 PCP on record: None Admit date: 10/6/2018 Discharge date and time: 10/12/2018 DISCHARGE DIAGNOSIS: 
DKA (diabetic ketoacidoses): resolved Type II Diabetes Hypernatremia: improved Acute Kidney Injury Thrombocytopenia Rhabdomyolysis CONSULTATIONS: 
IP CONSULT TO NEPHROLOGY 
IP CONSULT TO HEMATOLOGY Excerpted HPI from H&P of Valerie Dawkins MD: 
\"51 years old male from home with no significant past medical history presented to the ED for evaluation of change mental status patient had mechanical fall fall in the shower on October 1 and was evaluated in the ED and he had scalp laceration repaired with 4 staples according to the EMS report patient roommate was called EMS today because patient worsening mental status since injury according to the EMS patient baseline alert oriented x3 patient was noticed to be hypotensive on admission blood sugar was noticed to be significantly elevated about 1500. \" 
 
______________________________________________________________________ DISCHARGE SUMMARY/HOSPITAL COURSE:  for full details see H&P, daily progress notes, labs, consult notes. Hospital course via problem below: DKA/HHS: 
-s/p insulin gtt 
-patient on Lantus 45 units yesterday and 15 units humalog TID meals.  Patient received 21 units sliding scale yesterday thus I changed his regimen to below on discharge. 
-Nutrition consult prior to discharge to start education on carb counting 
-RN trained patient on insulin administration 
-I had a prolonged discussion with patient and family on 10/11 about insulin with emphasis on putting in to context his ACHS POC testing with his current basal/bolus regimen. 
  
Metabolic Encephalopathy: resovled 
  
PETER: secondary to dehydration and rhabdomyolysis-renal function slowly improving and continues to improve with discontinuation of IVF's 
 Severe Hypernatremia: 
-follow-up with Nephrology next week to have renal function checked 
-CK and Na improving 
-blakely and femoral CVC removed on 10/9 
  
Hypokalemia: resolved Hypophosphatemia now with Hyperphosphatemia: normalized on 10/10 Hypermagnesemia: improved 
-monitor electrolytes Leukocytosis most likely reactive: resolved BCx, NG5d Empiric IV antibiotic therapy discontinued in ICU by Intensivest team 
  Thrombocytopenia: improving prior to discharge, 80K today, Heme feels it may be associated with his Rhabdo 
-follow-up in 2 weeks with Dr. Rah Hunter 
-Case discussed with Dr. Rah Hunter today 
  
Obesity: Body mass index is 33.38 kg/(m^2). Please also note that patient presented to the ED on 10/1 and sustained head laceration that was stapled in the ED. Staples were removed prior to discharge. I is suspect that patient was having issues with PETER and DM at this time that may have contributed to his fall. Patient also has a blister per my discussion with RN on the back of his left calf. 
 
 
_______________________________________________________________________ Patient seen and examined by me on discharge day. Pertinent Findings: 
Gen:    Not in distress Chest: Clear lungs CVS:   Regular rhythm Abd:  Increased girth secondary to body habitus Neuro:  Alert 
_______________________________________________________________________ DISCHARGE MEDICATIONS:  
Current Discharge Medication List  
  
START taking these medications Details  
insulin glargine (LANTUS SOLOSTAR U-100 INSULIN) 100 unit/mL (3 mL) inpn Inject 55 units under the skin nightly. Please provide needles with Rx. Qty: 6 Adjustable Dose Pre-filled Pen Syringe, Refills: 1  
  
insulin lispro (HUMALOG KWIKPEN INSULIN) 100 unit/mL kwikpen Inject 15 units under the skin three times daily before meals + Sliding scale regimen as below, TIDAC (before meals):            
140-199=2 O           
200-249=3 u 
250-299=5 u 
300-349=7 u 
 350 or greaterer = 10u 
Qty: 2 Package, Refills: 1 My Recommended Diet, Activity, Wound Care, and follow-up labs are listed in the patient's Discharge Insturctions which I have personally completed and reviewed. ______________________________________________________________________ Risk of deterioration: Low 
 
Condition at Discharge:  Stable 
______________________________________________________________________ Disposition Home with family, no needs 
______________________________________________________________________ Care Plan discussed with:  
Patient, Family, RN, Consultant 
 
______________________________________________________________________ Code Status: Full Code 
______________________________________________________________________ Follow up with: PCP : None Follow-up Information Follow up With Details Comments Contact Info [facility] On 10/19/2018 Diabetes Education at 2:00 pm. Please arrive 10 minutes early and bring photo ID and insurance information with you to appointment. Cedric Patricia  [phone] 
[phone] Siria Monzon NP On 11/26/2018 10;30am  New PCP appointment. Please arrive 30min early and bring a photo ID, insurance card and a list of medications [address] [place] [id number] [phone] None   None ([phone]) Patient stated that they have no PCP Marina Acuna MD   [address] 
[phone] Milena Malagon MD Schedule an appointment as soon as possible for a visit for next week to have your kideny function checked [place]  [address] 
[phone] Total time in minutes spent coordinating this discharge (includes going over instructions, follow-up, prescriptions, and preparing report for sign off to her PCP) :  35 minutes Signed: Love Euceda MD

## 2018-10-12 NOTE — PROGRESS NOTES
Hematology Oncology Progress Note Follow up for: Thrombocytopenia Chart notes reviewed since last visit. Case discussed with following: 
 
Patient complains of the following:No complaints Additional concerns noted by the staff:  
 
Patient Vitals for the past 24 hrs: 
 BP Temp Pulse Resp SpO2  
10/12/18 0713 133/78 98.4 °F (36.9 °C) 94 20 100 % 10/12/18 0400 137/67 98.1 °F (36.7 °C) 80 18 99 % 10/11/18 2305 126/73 98.5 °F (36.9 °C) - - 100 % 10/11/18 2100 132/87 - - - -  
10/11/18 2055 (!) 130/117 98.7 °F (37.1 °C) (!) 102 17 99 % 10/11/18 1543 130/78 98.1 °F (36.7 °C) 95 16 100 % 10/11/18 1133 112/77 98.2 °F (36.8 °C) 86 16 100 % Review of Systems: 
12 point ROS done and negative except as above Physical Examination: 
Gen NAD 
CV reg Lungs clear Abd benign Labs: 
Recent Results (from the past 24 hour(s)) GLUCOSE, POC Collection Time: 10/11/18 11:35 AM  
Result Value Ref Range Glucose (POC) 261 (H) 65 - 100 mg/dL Performed by Anu Quan, POC Collection Time: 10/11/18  4:55 PM  
Result Value Ref Range Glucose (POC) 314 (H) 65 - 100 mg/dL Performed by Freda Chacko (PCT) GLUCOSE, POC Collection Time: 10/11/18  8:50 PM  
Result Value Ref Range Glucose (POC) 197 (H) 65 - 100 mg/dL Performed by [employer] GLUCOSE, POC Collection Time: 10/11/18 10:57 PM  
Result Value Ref Range Glucose (POC) 272 (H) 65 - 100 mg/dL Performed by Vlad Romero   
CBC WITH AUTOMATED DIFF Collection Time: 10/12/18  5:13 AM  
Result Value Ref Range WBC 9.3 4.1 - 11.1 K/uL  
 RBC 3.78 (L) 4.10 - 5.70 M/uL  
 HGB 10.5 (L) 12.1 - 17.0 g/dL HCT 32.4 (L) 36.6 - 50.3 % MCV 85.7 80.0 - 99.0 FL  
 MCH 27.8 26.0 - 34.0 PG  
 MCHC 32.4 30.0 - 36.5 g/dL  
 RDW 13.6 11.5 - 14.5 % PLATELET 80 (L) 348 - 400 K/uL MPV 12.4 8.9 - 12.9 FL  
 NRBC 0.0 0  WBC ABSOLUTE NRBC 0.00 0.00 - 0.01 K/uL NEUTROPHILS 65 32 - 75 % LYMPHOCYTES 20 12 - 49 % MONOCYTES 13 5 - 13 % EOSINOPHILS 2 0 - 7 % BASOPHILS 0 0 - 1 % IMMATURE GRANULOCYTES 1 (H) 0.0 - 0.5 % ABS. NEUTROPHILS 6.0 1.8 - 8.0 K/UL  
 ABS. LYMPHOCYTES 1.8 0.8 - 3.5 K/UL  
 ABS. MONOCYTES 1.2 (H) 0.0 - 1.0 K/UL  
 ABS. EOSINOPHILS 0.2 0.0 - 0.4 K/UL  
 ABS. BASOPHILS 0.0 0.0 - 0.1 K/UL  
 ABS. IMM. GRANS. 0.1 (H) 0.00 - 0.04 K/UL  
 DF AUTOMATED METABOLIC PANEL, BASIC Collection Time: 10/12/18  5:13 AM  
Result Value Ref Range Sodium 147 (H) 136 - 145 mmol/L Potassium 4.5 3.5 - 5.1 mmol/L Chloride 117 (H) 97 - 108 mmol/L  
 CO2 23 21 - 32 mmol/L Anion gap 7 5 - 15 mmol/L Glucose 241 (H) 65 - 100 mg/dL BUN 40 (H) 6 - 20 MG/DL Creatinine 2.98 (H) 0.70 - 1.30 MG/DL  
 BUN/Creatinine ratio 13 12 - 20 GFR est AA 27 (L) >60 ml/min/1.73m2 GFR est non-AA 23 (L) >60 ml/min/1.73m2 Calcium 8.5 8.5 - 10.1 MG/DL  
CK Collection Time: 10/12/18  5:13 AM  
Result Value Ref Range CK 1415 (H) 39 - 308 U/L  
GLUCOSE, POC Collection Time: 10/12/18  7:10 AM  
Result Value Ref Range Glucose (POC) 236 (H) 65 - 100 mg/dL Performed by Buffy Kobuk  (PCT) Assessment and Plan: Thrombocytopenia 
-No sign of hemolysis, DIC, smear showed no schistocytes,  
-PLTs improved 
-Rhabdo may have been contributing 
-Abd US negative Rhabdo 
-improved Patient ok for discharge from my standpoint. Will have him f/u in office with me in a couple of weeks.

## 2018-10-12 NOTE — PROGRESS NOTES
Spiritual Care Partner Volunteer visited patient in [address] [facility] unit on October 12, 2016. Documented by: 
ENRICO Corcoran, [facility],  [facility]  Paging Service  [phone])

## 2018-10-12 NOTE — PROGRESS NOTES
Bedside shift change report given to Lisa Bennett RN (oncoming nurse) by Doron Acosta RN (offgoing nurse). Report included the following information SBAR.

## 2018-10-12 NOTE — PROGRESS NOTES
NAME: Yesenia Silva  
     :  1970 MRN:  [id number] Assessment :    Plan: 
--PETER 
rhabdo DKA Severe hypernatremia AMS 
thrombocytopenia --Creatinine improved (peaked at 6.1; now 3.3 to 3). Great uop. NML renal U/S. CK better 84240 to 7400 to 5600 to 3000 to 1400. Sodium mildly high. Continue with high fluid intake (> 3 liters water a day). He is scheduled to come to my office on Tuesday, 10/16 at 9 am for a BMP and CPK check. Subjective: Chief Complaint:  Alert. No sob. No cp. No n/v/d. Tolerating po. We discussed the above. Review of Systems: 
 
Symptom Y/N Comments  Symptom Y/N Comments Fever/Chills    Chest Pain n   
Poor Appetite n   Edema n   
Cough    Abdominal Pain n   
Sputum    Joint Pain SOB/HERNANDEZ n   Pruritis/Rash Nausea/vomit n   Tolerating PT/OT Diarrhea    Tolerating Diet Constipation    Other Could not obtain due to:   
 
Objective: VITALS:  
Last 24hrs VS reviewed since prior progress note. Most recent are: 
Visit Vitals  /78 (BP 1 Location: Right arm, BP Patient Position: At rest)  Pulse 94  Temp 98.4 °F (36.9 °C)  Resp 20  
 Ht 6' 2\" (1.88 m)  Wt 117.9 kg (260 lb)  SpO2 100%  BMI 33.38 kg/m2 Intake/Output Summary (Last 24 hours) at 10/12/18 1057 Last data filed at 10/12/18 0400 Gross per 24 hour Intake              650 ml Output              300 ml Net              350 ml Telemetry Reviewed: PHYSICAL EXAM: 
General: NAD 
CTA 
RRR 
abd soft No edema Lab Data Reviewed: (see below) Medications Reviewed: (see below) PMH/SH reviewed - no change compared to H&P 
________________________________________________________________________ Care Plan discussed with: 
Patient y Family RN y   
Care Manager Consultant:     
 
  Duong >50% of visit spent in counseling and coordination of care    
 
________________________________________________________________________ Nuha Baker MD  
 
Procedures: see electronic medical records for all procedures/Xrays and details which 
were not copied into this note but were reviewed prior to creation of Plan. LABS: 
Recent Labs 10/12/18 
 5884  10/11/18 
 5098 WBC  9.3  10.5 HGB  10.5*  11.3* HCT  32.4*  34.2* PLT  80*  62* Recent Labs 10/12/18 
 7800  10/11/18 
 0713  10/10/18 
 0235 NA  147*  146*  151*  
K  4.5  4.9  4.7 CL  117*  118*  122* CO2  23  20*  22 BUN  40*  43*  65* CREA  2.98*  3.27*  4.60* GLU  241*  303*  322* CA  8.5  8.5  8.0*  
MG   --    --   2.1 PHOS   --    --   4.7 Recent Labs 10/10/18 
 0235 SGOT  158* AP  97  
TP  6.5 ALB  2.4*  
GLOB  4.1* Recent Labs 10/10/18 6 [facility] INR  1.0 PTP  10.7 APTT  23.4 No results for input(s): FE, TIBC, PSAT, FERR in the last 72 hours. Lab Results Component Value Date/Time Folate 8.5 10/11/2018 04:39 AM  
  
No results for input(s): PH, PCO2, PO2 in the last 72 hours. Recent Labs 10/12/18 
 4117  10/11/18 
 7248  10/10/18 
 0235 CPK  1415*  [id number]*  5633* No components found for: [place] Lab Results Component Value Date/Time  Color YELLOW/STRAW 10/06/2018 05:12 AM  
 Appearance CLEAR 10/06/2018 05:12 AM  
 Specific gravity 1.028 10/06/2018 05:12 AM  
 pH (UA) 5.0 10/06/2018 05:12 AM  
 Protein 30 (A) 10/06/2018 05:12 AM  
 Glucose >1000 (A) 10/06/2018 05:12 AM  
 Ketone 15 (A) 10/06/2018 05:12 AM  
 Bilirubin NEGATIVE  10/06/2018 05:12 AM  
 Urobilinogen 0.2 10/06/2018 05:12 AM  
 Nitrites NEGATIVE  10/06/2018 05:12 AM  
 Leukocyte Esterase NEGATIVE  10/06/2018 05:12 AM  
 Epithelial cells FEW 10/06/2018 05:12 AM  
 Bacteria NEGATIVE  10/06/2018 05:12 AM  
 WBC 0-4 10/06/2018 05:12 AM  
 RBC 0-5 10/06/2018 05:12 AM  
 
 
MEDICATIONS: 
 Current Facility-Administered Medications Medication Dose Route Frequency  insulin glargine (LANTUS) injection 55 Units  55 Units SubCUTAneous QHS  insulin lispro (HUMALOG) injection 15 Units  15 Units SubCUTAneous TIDAC  famotidine (PEPCID) tablet 20 mg  20 mg Oral DAILY  insulin lispro (HUMALOG) injection   SubCUTAneous AC&HS  sodium chloride (NS) flush 5-10 mL  5-10 mL IntraVENous Q8H  
 sodium chloride (NS) flush 5-10 mL  5-10 mL IntraVENous PRN

## 2018-10-12 NOTE — PROGRESS NOTES
[id number] - Patient being discharged, but states he cannot find his a bag with 2 cellphones in it. Patient's brother and 1 other visitor present at the time. Patient came to [facility]  from [facility]; I called CCU and they affirmed that the patient was brought over with all of his belongings. Patient states he is OK with being discharged, just to let him know \"if they show up\". Phones include: black "[employer] and blue [employer] He does have his own iphone still. Best contact number is [phone]. A copy of this information was given to Naman Caldera Dr.

## 2018-10-12 NOTE — DISCHARGE INSTRUCTIONS
HOSPITALIST DISCHARGE INSTRUCTIONS    NAME: Harijt Constantino   :  1970   MRN:  [id number]     Date/Time:  10/12/2018 9:37 AM    ADMIT DATE: 10/6/2018     DISCHARGE DATE: 10/12/2018     DISCHARGE DIAGNOSIS:  DKA (diabetic ketoacidoses): resolved  Type II Diabetes  Hypernatremia: improved  Acute Kidney Injury  Thrombocytopenia  Rhabdomyolysis     MEDICATIONS:  As per medication reconciliation  list  · It is important that you take the medication exactly as they are prescribed. · Keep your medication in the bottles provided by the pharmacist and keep a list of the medication names, dosages, and times to be taken in your wallet. · Do not take other medications without consulting your doctor. Pain Management: Use tylenol as needed for pain. Avoid NSAIDs as this class of medication is bad for your kidneys. What to do at Home    Recommended diet:  Diabetic Diet    Recommended activity: Activity as tolerated    If you experience any of the following symptoms then please call your primary care physician or return to the emergency room if you cannot get hold of your doctor:  Fever, chills, nausea, vomiting, diarrhea, change in mentation, falling, bleeding, shortness of breath or any other concerning symptoms. Follow Up: With a new PCP as soon as possible. With Dr. William Haney in  2 weeks to have your blood counts checked. With Dr. April Healy next week to have you kidney function checked. On 10/16 at 9 am.      Information obtained by :  I understand that if any problems occur once I am at home I am to contact my physician. I understand and acknowledge receipt of the instructions indicated above.                                                                                                                                            Physician's or R.N.'s Signature                                                                  Date/Time Patient or Representative Signature                                                          Date/Time

## 2018-10-12 NOTE — PROGRESS NOTES
Brief Nutrition Note Chart reviewed. DM nutrition education was provided to pt yesterday. Followed-up with him this morning to clarify some questions he had. Cinthia Nelson, JORGE Pager: [phone]

## 2018-10-12 NOTE — PROGRESS NOTES
1155 Patient requested additional education on his blood glucose monitoring before being discharged. Diabetes treatment center was contacted.

## 2018-10-12 NOTE — ROUTINE PROCESS
2250 Pt requested another blood sugar check at 2300 ,and does not want lantus at 2200 Wants later says \"I took a lot of the insulin today \" Bs was 272 @ 272 si I will give Lantus as ordered now

## 2018-10-18 DIAGNOSIS — E11.9 DIABETES MELLITUS WITHOUT COMPLICATION (HCC): Primary | ICD-10-CM

## 2018-10-19 ENCOUNTER — HOSPITAL ENCOUNTER (OUTPATIENT)
Dept: DIABETES SERVICES | Age: 48
Discharge: HOME | End: 2018-10-19
Payer: COMMERCIAL

## 2018-10-19 DIAGNOSIS — E11.9 DIABETES MELLITUS WITHOUT COMPLICATION (HCC): ICD-10-CM

## 2018-10-19 PROCEDURE — G0109 DIAB MANAGE TRN IND/GROUP: HCPCS

## 2018-11-23 ENCOUNTER — TELEPHONE (OUTPATIENT)
Dept: FAMILY MEDICINE CLINIC | Age: 48
End: 2018-11-23

## 2020-01-01 ENCOUNTER — APPOINTMENT (OUTPATIENT)
Dept: INTERVENTIONAL RADIOLOGY/VASCULAR | Age: 50
DRG: 871 | End: 2020-01-01
Attending: INTERNAL MEDICINE
Payer: COMMERCIAL

## 2020-01-01 ENCOUNTER — HOSPITAL ENCOUNTER (INPATIENT)
Age: 50
LOS: 3 days | DRG: 871 | End: 2020-01-10
Attending: EMERGENCY MEDICINE | Admitting: INTERNAL MEDICINE
Payer: COMMERCIAL

## 2020-01-01 ENCOUNTER — APPOINTMENT (OUTPATIENT)
Dept: GENERAL RADIOLOGY | Age: 50
DRG: 871 | End: 2020-01-01
Attending: FAMILY MEDICINE
Payer: COMMERCIAL

## 2020-01-01 ENCOUNTER — APPOINTMENT (OUTPATIENT)
Dept: NON INVASIVE DIAGNOSTICS | Age: 50
DRG: 871 | End: 2020-01-01
Attending: INTERNAL MEDICINE
Payer: COMMERCIAL

## 2020-01-01 ENCOUNTER — APPOINTMENT (OUTPATIENT)
Dept: GENERAL RADIOLOGY | Age: 50
DRG: 871 | End: 2020-01-01
Attending: EMERGENCY MEDICINE
Payer: COMMERCIAL

## 2020-01-01 ENCOUNTER — APPOINTMENT (OUTPATIENT)
Dept: GENERAL RADIOLOGY | Age: 50
DRG: 871 | End: 2020-01-01
Attending: INTERNAL MEDICINE
Payer: COMMERCIAL

## 2020-01-01 ENCOUNTER — APPOINTMENT (OUTPATIENT)
Dept: CT IMAGING | Age: 50
DRG: 871 | End: 2020-01-01
Attending: EMERGENCY MEDICINE
Payer: COMMERCIAL

## 2020-01-01 ENCOUNTER — HOSPITAL ENCOUNTER (EMERGENCY)
Age: 50
Discharge: STILL ADMITTED OR ADMINISTRATIVE | DRG: 871 | End: 2020-01-07
Attending: EMERGENCY MEDICINE
Payer: COMMERCIAL

## 2020-01-01 ENCOUNTER — HOSPITAL ENCOUNTER (EMERGENCY)
Age: 50
Discharge: STILL ADMITTED OR ADMINISTRATIVE | End: 2020-01-07
Attending: EMERGENCY MEDICINE
Payer: COMMERCIAL

## 2020-01-01 ENCOUNTER — APPOINTMENT (OUTPATIENT)
Dept: ULTRASOUND IMAGING | Age: 50
DRG: 871 | End: 2020-01-01
Attending: INTERNAL MEDICINE
Payer: COMMERCIAL

## 2020-01-01 VITALS
HEIGHT: 74 IN | DIASTOLIC BLOOD PRESSURE: 38 MMHG | BODY MASS INDEX: 26.95 KG/M2 | RESPIRATION RATE: 23 BRPM | OXYGEN SATURATION: 71 % | WEIGHT: 210 LBS | SYSTOLIC BLOOD PRESSURE: 102 MMHG | TEMPERATURE: 99.6 F | HEART RATE: 174 BPM

## 2020-01-01 VITALS
RESPIRATION RATE: 33 BRPM | OXYGEN SATURATION: 96 % | SYSTOLIC BLOOD PRESSURE: 64 MMHG | WEIGHT: 210.32 LBS | TEMPERATURE: 96.5 F | DIASTOLIC BLOOD PRESSURE: 55 MMHG | HEART RATE: 94 BPM

## 2020-01-01 DIAGNOSIS — R41.82 ALTERED MENTAL STATUS, UNSPECIFIED ALTERED MENTAL STATUS TYPE: ICD-10-CM

## 2020-01-01 DIAGNOSIS — A41.9 SEVERE SEPSIS (HCC): ICD-10-CM

## 2020-01-01 DIAGNOSIS — E13.11 DIABETIC KETOACIDOSIS WITH COMA ASSOCIATED WITH OTHER SPECIFIED DIABETES MELLITUS (HCC): Primary | ICD-10-CM

## 2020-01-01 DIAGNOSIS — I21.4 NSTEMI (NON-ST ELEVATED MYOCARDIAL INFARCTION) (HCC): ICD-10-CM

## 2020-01-01 DIAGNOSIS — E11.11 DIABETIC KETOACIDOSIS WITH COMA ASSOCIATED WITH TYPE 2 DIABETES MELLITUS (HCC): ICD-10-CM

## 2020-01-01 DIAGNOSIS — R65.20 SEVERE SEPSIS (HCC): ICD-10-CM

## 2020-01-01 DIAGNOSIS — J96.90 RESPIRATORY FAILURE, UNSPECIFIED CHRONICITY, UNSPECIFIED WHETHER WITH HYPOXIA OR HYPERCAPNIA (HCC): ICD-10-CM

## 2020-01-01 LAB
ADMINISTERED INITIALS, ADMINIT: NORMAL
ALBUMIN SERPL-MCNC: 1.6 G/DL (ref 3.5–5)
ALBUMIN SERPL-MCNC: 1.9 G/DL (ref 3.5–5)
ALBUMIN SERPL-MCNC: 2 G/DL (ref 3.5–5)
ALBUMIN SERPL-MCNC: 2.1 G/DL (ref 3.5–5)
ALBUMIN SERPL-MCNC: 2.1 G/DL (ref 3.5–5)
ALBUMIN SERPL-MCNC: 2.5 G/DL (ref 3.5–5)
ALBUMIN SERPL-MCNC: 3 G/DL (ref 3.5–5)
ALBUMIN/GLOB SERPL: 0.5 {RATIO} (ref 1.1–2.2)
ALBUMIN/GLOB SERPL: 0.5 {RATIO} (ref 1.1–2.2)
ALBUMIN/GLOB SERPL: 0.6 {RATIO} (ref 1.1–2.2)
ALBUMIN/GLOB SERPL: 0.7 {RATIO} (ref 1.1–2.2)
ALP SERPL-CCNC: 139 U/L (ref 45–117)
ALP SERPL-CCNC: 139 U/L (ref 45–117)
ALP SERPL-CCNC: 142 U/L (ref 45–117)
ALP SERPL-CCNC: 220 U/L (ref 45–117)
ALT SERPL-CCNC: 2766 U/L (ref 12–78)
ALT SERPL-CCNC: 30 U/L (ref 12–78)
ALT SERPL-CCNC: 3067 U/L (ref 12–78)
ALT SERPL-CCNC: 3270 U/L (ref 12–78)
AMMONIA PLAS-SCNC: 21 UMOL/L
AMPHET UR QL SCN: NEGATIVE
ANION GAP BLD CALC-SCNC: 25 MMOL/L (ref 10–20)
ANION GAP SERPL CALC-SCNC: 11 MMOL/L (ref 5–15)
ANION GAP SERPL CALC-SCNC: 12 MMOL/L (ref 5–15)
ANION GAP SERPL CALC-SCNC: 12 MMOL/L (ref 5–15)
ANION GAP SERPL CALC-SCNC: 13 MMOL/L (ref 5–15)
ANION GAP SERPL CALC-SCNC: 13 MMOL/L (ref 5–15)
ANION GAP SERPL CALC-SCNC: 14 MMOL/L (ref 5–15)
ANION GAP SERPL CALC-SCNC: 16 MMOL/L (ref 5–15)
ANION GAP SERPL CALC-SCNC: 17 MMOL/L (ref 5–15)
ANION GAP SERPL CALC-SCNC: 18 MMOL/L (ref 5–15)
ANION GAP SERPL CALC-SCNC: 19 MMOL/L (ref 5–15)
ANION GAP SERPL CALC-SCNC: 23 MMOL/L (ref 5–15)
ANION GAP SERPL CALC-SCNC: 25 MMOL/L (ref 5–15)
ANION GAP SERPL CALC-SCNC: 27 MMOL/L (ref 5–15)
ANION GAP SERPL CALC-SCNC: 27 MMOL/L (ref 5–15)
APAP SERPL-MCNC: 2 UG/ML (ref 10–30)
APPEARANCE UR: CLEAR
APTT PPP: 21.2 SEC (ref 22.1–32)
APTT PPP: 24.5 SEC (ref 22.1–32)
ARTERIAL PATENCY WRIST A: YES
AST SERPL-CCNC: 13 U/L (ref 15–37)
AST SERPL-CCNC: >2000 U/L (ref 15–37)
ATRIAL RATE: 94 BPM
AV VELOCITY RATIO: 1.1
BACTERIA URNS QL MICRO: NEGATIVE /HPF
BARBITURATES UR QL SCN: NEGATIVE
BASE DEFICIT BLD-SCNC: 10 MMOL/L
BASE DEFICIT BLD-SCNC: 12 MMOL/L
BASE DEFICIT BLD-SCNC: 15 MMOL/L
BASE DEFICIT BLD-SCNC: 16 MMOL/L
BASOPHILS # BLD: 0 K/UL (ref 0–0.1)
BASOPHILS # BLD: 0.2 K/UL (ref 0–0.1)
BASOPHILS NFR BLD: 0 % (ref 0–1)
BASOPHILS NFR BLD: 1 % (ref 0–1)
BDY SITE: ABNORMAL
BENZODIAZ UR QL: NEGATIVE
BILIRUB SERPL-MCNC: 0.6 MG/DL (ref 0.2–1)
BILIRUB SERPL-MCNC: 0.7 MG/DL (ref 0.2–1)
BILIRUB SERPL-MCNC: 0.7 MG/DL (ref 0.2–1)
BILIRUB SERPL-MCNC: 2.2 MG/DL (ref 0.2–1)
BILIRUB UR QL CFM: NEGATIVE
BUN BLD-MCNC: 116 MG/DL (ref 9–20)
BUN SERPL-MCNC: 102 MG/DL (ref 6–20)
BUN SERPL-MCNC: 107 MG/DL (ref 6–20)
BUN SERPL-MCNC: 117 MG/DL (ref 6–20)
BUN SERPL-MCNC: 118 MG/DL (ref 6–20)
BUN SERPL-MCNC: 122 MG/DL (ref 6–20)
BUN SERPL-MCNC: 123 MG/DL (ref 6–20)
BUN SERPL-MCNC: 123 MG/DL (ref 6–20)
BUN SERPL-MCNC: 127 MG/DL (ref 6–20)
BUN SERPL-MCNC: 128 MG/DL (ref 6–20)
BUN SERPL-MCNC: 129 MG/DL (ref 6–20)
BUN SERPL-MCNC: 130 MG/DL (ref 6–20)
BUN SERPL-MCNC: 132 MG/DL (ref 6–20)
BUN SERPL-MCNC: 134 MG/DL (ref 6–20)
BUN SERPL-MCNC: 137 MG/DL (ref 6–20)
BUN SERPL-MCNC: 140 MG/DL (ref 6–20)
BUN SERPL-MCNC: 142 MG/DL (ref 6–20)
BUN SERPL-MCNC: 90 MG/DL (ref 6–20)
BUN SERPL-MCNC: 92 MG/DL (ref 6–20)
BUN/CREAT SERPL: 10 (ref 12–20)
BUN/CREAT SERPL: 11 (ref 12–20)
BUN/CREAT SERPL: 12 (ref 12–20)
BUN/CREAT SERPL: 13 (ref 12–20)
BUN/CREAT SERPL: 14 (ref 12–20)
CA-I BLD-MCNC: 1.1 MMOL/L (ref 1.12–1.32)
CALCIUM SERPL-MCNC: 5.3 MG/DL (ref 8.5–10.1)
CALCIUM SERPL-MCNC: 5.7 MG/DL (ref 8.5–10.1)
CALCIUM SERPL-MCNC: 6.2 MG/DL (ref 8.5–10.1)
CALCIUM SERPL-MCNC: 6.3 MG/DL (ref 8.5–10.1)
CALCIUM SERPL-MCNC: 6.5 MG/DL (ref 8.5–10.1)
CALCIUM SERPL-MCNC: 6.6 MG/DL (ref 8.5–10.1)
CALCIUM SERPL-MCNC: 6.8 MG/DL (ref 8.5–10.1)
CALCIUM SERPL-MCNC: 7.2 MG/DL (ref 8.5–10.1)
CALCIUM SERPL-MCNC: 7.3 MG/DL (ref 8.5–10.1)
CALCIUM SERPL-MCNC: 7.4 MG/DL (ref 8.5–10.1)
CALCIUM SERPL-MCNC: 7.8 MG/DL (ref 8.5–10.1)
CALCIUM SERPL-MCNC: 8 MG/DL (ref 8.5–10.1)
CALCIUM SERPL-MCNC: 8.4 MG/DL (ref 8.5–10.1)
CALCIUM SERPL-MCNC: 8.5 MG/DL (ref 8.5–10.1)
CALCIUM SERPL-MCNC: 8.6 MG/DL (ref 8.5–10.1)
CALCULATED P AXIS, ECG09: 49 DEGREES
CALCULATED R AXIS, ECG10: 17 DEGREES
CALCULATED T AXIS, ECG11: -10 DEGREES
CANNABINOIDS UR QL SCN: NEGATIVE
CHLORIDE BLD-SCNC: 115 MMOL/L (ref 98–107)
CHLORIDE SERPL-SCNC: 107 MMOL/L (ref 97–108)
CHLORIDE SERPL-SCNC: 110 MMOL/L (ref 97–108)
CHLORIDE SERPL-SCNC: 110 MMOL/L (ref 97–108)
CHLORIDE SERPL-SCNC: 114 MMOL/L (ref 97–108)
CHLORIDE SERPL-SCNC: 114 MMOL/L (ref 97–108)
CHLORIDE SERPL-SCNC: 117 MMOL/L (ref 97–108)
CHLORIDE SERPL-SCNC: 117 MMOL/L (ref 97–108)
CHLORIDE SERPL-SCNC: 119 MMOL/L (ref 97–108)
CHLORIDE SERPL-SCNC: 122 MMOL/L (ref 97–108)
CHLORIDE SERPL-SCNC: 124 MMOL/L (ref 97–108)
CHLORIDE SERPL-SCNC: 126 MMOL/L (ref 97–108)
CHLORIDE SERPL-SCNC: 126 MMOL/L (ref 97–108)
CHLORIDE SERPL-SCNC: 127 MMOL/L (ref 97–108)
CHLORIDE SERPL-SCNC: 127 MMOL/L (ref 97–108)
CHLORIDE SERPL-SCNC: 128 MMOL/L (ref 97–108)
CHLORIDE SERPL-SCNC: 130 MMOL/L (ref 97–108)
CHLORIDE SERPL-SCNC: 130 MMOL/L (ref 97–108)
CHLORIDE SERPL-SCNC: 132 MMOL/L (ref 97–108)
CK SERPL-CCNC: 891 U/L (ref 39–308)
CK SERPL-CCNC: ABNORMAL U/L (ref 39–308)
CO2 BLD-SCNC: 14 MMOL/L (ref 21–32)
CO2 SERPL-SCNC: 12 MMOL/L (ref 21–32)
CO2 SERPL-SCNC: 13 MMOL/L (ref 21–32)
CO2 SERPL-SCNC: 13 MMOL/L (ref 21–32)
CO2 SERPL-SCNC: 14 MMOL/L (ref 21–32)
CO2 SERPL-SCNC: 14 MMOL/L (ref 21–32)
CO2 SERPL-SCNC: 15 MMOL/L (ref 21–32)
CO2 SERPL-SCNC: 17 MMOL/L (ref 21–32)
CO2 SERPL-SCNC: 17 MMOL/L (ref 21–32)
CO2 SERPL-SCNC: 18 MMOL/L (ref 21–32)
CO2 SERPL-SCNC: 19 MMOL/L (ref 21–32)
CO2 SERPL-SCNC: 20 MMOL/L (ref 21–32)
CO2 SERPL-SCNC: 20 MMOL/L (ref 21–32)
CO2 SERPL-SCNC: 21 MMOL/L (ref 21–32)
COCAINE UR QL SCN: NEGATIVE
COLOR UR: ABNORMAL
COMMENT, HOLDF: NORMAL
CREAT BLD-MCNC: 8.5 MG/DL (ref 0.6–1.3)
CREAT SERPL-MCNC: 10.1 MG/DL (ref 0.7–1.3)
CREAT SERPL-MCNC: 10.1 MG/DL (ref 0.7–1.3)
CREAT SERPL-MCNC: 10.7 MG/DL (ref 0.7–1.3)
CREAT SERPL-MCNC: 10.7 MG/DL (ref 0.7–1.3)
CREAT SERPL-MCNC: 10.9 MG/DL (ref 0.7–1.3)
CREAT SERPL-MCNC: 11.1 MG/DL (ref 0.7–1.3)
CREAT SERPL-MCNC: 11.3 MG/DL (ref 0.7–1.3)
CREAT SERPL-MCNC: 11.4 MG/DL (ref 0.7–1.3)
CREAT SERPL-MCNC: 12.3 MG/DL (ref 0.7–1.3)
CREAT SERPL-MCNC: 13.2 MG/DL (ref 0.7–1.3)
CREAT SERPL-MCNC: 8.35 MG/DL (ref 0.7–1.3)
CREAT SERPL-MCNC: 8.83 MG/DL (ref 0.7–1.3)
CREAT SERPL-MCNC: 9.2 MG/DL (ref 0.7–1.3)
CREAT SERPL-MCNC: 9.26 MG/DL (ref 0.7–1.3)
CREAT SERPL-MCNC: 9.66 MG/DL (ref 0.7–1.3)
CREAT SERPL-MCNC: 9.73 MG/DL (ref 0.7–1.3)
CREAT SERPL-MCNC: 9.97 MG/DL (ref 0.7–1.3)
CREAT SERPL-MCNC: 9.97 MG/DL (ref 0.7–1.3)
D50 ADMINISTERED, D50ADM: 0 ML
D50 ORDER, D50ORD: 0 ML
DIAGNOSIS, 93000: NORMAL
DIFFERENTIAL METHOD BLD: ABNORMAL
DRUG SCRN COMMENT,DRGCM: NORMAL
ECHO AV AREA PEAK VELOCITY: 3.1 CM2
ECHO AV AREA/BSA PEAK VELOCITY: 1.4 CM2/M2
ECHO AV PEAK GRADIENT: 12.5 MMHG
ECHO AV PEAK VELOCITY: 176.69 CM/S
ECHO EST RA PRESSURE: 10 MMHG
ECHO LA AREA 4C: 12 CM2
ECHO LA MAJOR AXIS: 2.51 CM
ECHO LA VOL 4C: 26.92 ML (ref 18–58)
ECHO LA VOLUME INDEX A4C: 12.13 ML/M2 (ref 16–28)
ECHO LV E' LATERAL VELOCITY: 8.77 CM/S
ECHO LV E' SEPTAL VELOCITY: 3.9 CM/S
ECHO LV EDV TEICHHOLZ: 0.45 ML
ECHO LV ESV TEICHHOLZ: 0.09 ML
ECHO LV INTERNAL DIMENSION DIASTOLIC: 4.33 CM (ref 4.2–5.9)
ECHO LV INTERNAL DIMENSION SYSTOLIC: 2.21 CM
ECHO LV IVSD: 1.24 CM (ref 0.6–1)
ECHO LV MASS 2D: 214.4 G (ref 88–224)
ECHO LV MASS INDEX 2D: 96.6 G/M2 (ref 49–115)
ECHO LV POSTERIOR WALL DIASTOLIC: 1.13 CM (ref 0.6–1)
ECHO LVOT DIAM: 1.9 CM
ECHO LVOT PEAK GRADIENT: 15 MMHG
ECHO LVOT PEAK VELOCITY: 193.53 CM/S
ECHO MV A VELOCITY: 61.7 CM/S
ECHO MV E DECELERATION TIME (DT): 329.2 MS
ECHO MV E VELOCITY: 37.72 CM/S
ECHO MV E/A RATIO: 0.61
ECHO MV E/E' LATERAL: 4.3
ECHO MV E/E' RATIO (AVERAGED): 6.99
ECHO MV E/E' SEPTAL: 9.67
ECHO MV REGURGITANT PEAK GRADIENT: 29.8 MMHG
ECHO MV REGURGITANT PEAK VELOCITY: 272.97 CM/S
ECHO PULMONARY ARTERY SYSTOLIC PRESSURE (PASP): 20.2 MMHG
ECHO RIGHT VENTRICULAR SYSTOLIC PRESSURE (RVSP): 20.2 MMHG
ECHO TV REGURGITANT MAX VELOCITY: 159.85 CM/S
ECHO TV REGURGITANT PEAK GRADIENT: 10.2 MMHG
EOSINOPHIL # BLD: 0 K/UL (ref 0–0.4)
EOSINOPHIL NFR BLD: 0 % (ref 0–7)
EPITH CASTS URNS QL MICRO: ABNORMAL /LPF
ERYTHROCYTE [DISTWIDTH] IN BLOOD BY AUTOMATED COUNT: 14.3 % (ref 11.5–14.5)
ERYTHROCYTE [DISTWIDTH] IN BLOOD BY AUTOMATED COUNT: 14.5 % (ref 11.5–14.5)
ERYTHROCYTE [DISTWIDTH] IN BLOOD BY AUTOMATED COUNT: 14.7 % (ref 11.5–14.5)
ERYTHROCYTE [DISTWIDTH] IN BLOOD BY AUTOMATED COUNT: 14.9 % (ref 11.5–14.5)
ERYTHROCYTE [DISTWIDTH] IN BLOOD BY AUTOMATED COUNT: 15.7 % (ref 11.5–14.5)
EST. AVERAGE GLUCOSE BLD GHB EST-MCNC: 321 MG/DL
ETHANOL SERPL-MCNC: <10 MG/DL
FIBRINOGEN PPP-MCNC: 430 MG/DL (ref 200–475)
FLUAV AG NPH QL IA: NEGATIVE
FLUBV AG NOSE QL IA: NEGATIVE
GAS FLOW.O2 O2 DELIVERY SYS: ABNORMAL L/MIN
GAS FLOW.O2 SETTING OXYMISER: 14 BPM
GLOBULIN SER CALC-MCNC: 3.8 G/DL (ref 2–4)
GLOBULIN SER CALC-MCNC: 3.9 G/DL (ref 2–4)
GLOBULIN SER CALC-MCNC: 4.4 G/DL (ref 2–4)
GLOBULIN SER CALC-MCNC: 4.4 G/DL (ref 2–4)
GLSCOM COMMENTS: NORMAL
GLUCOSE BLD STRIP.AUTO-MCNC: 101 MG/DL (ref 65–100)
GLUCOSE BLD STRIP.AUTO-MCNC: 104 MG/DL (ref 65–100)
GLUCOSE BLD STRIP.AUTO-MCNC: 105 MG/DL (ref 65–100)
GLUCOSE BLD STRIP.AUTO-MCNC: 107 MG/DL (ref 65–100)
GLUCOSE BLD STRIP.AUTO-MCNC: 108 MG/DL (ref 65–100)
GLUCOSE BLD STRIP.AUTO-MCNC: 110 MG/DL (ref 65–100)
GLUCOSE BLD STRIP.AUTO-MCNC: 112 MG/DL (ref 65–100)
GLUCOSE BLD STRIP.AUTO-MCNC: 112 MG/DL (ref 65–100)
GLUCOSE BLD STRIP.AUTO-MCNC: 115 MG/DL (ref 65–100)
GLUCOSE BLD STRIP.AUTO-MCNC: 116 MG/DL (ref 65–100)
GLUCOSE BLD STRIP.AUTO-MCNC: 117 MG/DL (ref 65–100)
GLUCOSE BLD STRIP.AUTO-MCNC: 123 MG/DL (ref 65–100)
GLUCOSE BLD STRIP.AUTO-MCNC: 131 MG/DL (ref 65–100)
GLUCOSE BLD STRIP.AUTO-MCNC: 131 MG/DL (ref 65–100)
GLUCOSE BLD STRIP.AUTO-MCNC: 132 MG/DL (ref 65–100)
GLUCOSE BLD STRIP.AUTO-MCNC: 136 MG/DL (ref 65–100)
GLUCOSE BLD STRIP.AUTO-MCNC: 145 MG/DL (ref 65–100)
GLUCOSE BLD STRIP.AUTO-MCNC: 146 MG/DL (ref 65–100)
GLUCOSE BLD STRIP.AUTO-MCNC: 146 MG/DL (ref 65–100)
GLUCOSE BLD STRIP.AUTO-MCNC: 154 MG/DL (ref 65–100)
GLUCOSE BLD STRIP.AUTO-MCNC: 156 MG/DL (ref 65–100)
GLUCOSE BLD STRIP.AUTO-MCNC: 156 MG/DL (ref 65–100)
GLUCOSE BLD STRIP.AUTO-MCNC: 157 MG/DL (ref 65–100)
GLUCOSE BLD STRIP.AUTO-MCNC: 159 MG/DL (ref 65–100)
GLUCOSE BLD STRIP.AUTO-MCNC: 172 MG/DL (ref 65–100)
GLUCOSE BLD STRIP.AUTO-MCNC: 173 MG/DL (ref 65–100)
GLUCOSE BLD STRIP.AUTO-MCNC: 174 MG/DL (ref 65–100)
GLUCOSE BLD STRIP.AUTO-MCNC: 176 MG/DL (ref 65–100)
GLUCOSE BLD STRIP.AUTO-MCNC: 181 MG/DL (ref 65–100)
GLUCOSE BLD STRIP.AUTO-MCNC: 186 MG/DL (ref 65–100)
GLUCOSE BLD STRIP.AUTO-MCNC: 196 MG/DL (ref 65–100)
GLUCOSE BLD STRIP.AUTO-MCNC: 199 MG/DL (ref 65–100)
GLUCOSE BLD STRIP.AUTO-MCNC: 205 MG/DL (ref 65–100)
GLUCOSE BLD STRIP.AUTO-MCNC: 212 MG/DL (ref 65–100)
GLUCOSE BLD STRIP.AUTO-MCNC: 216 MG/DL (ref 65–100)
GLUCOSE BLD STRIP.AUTO-MCNC: 217 MG/DL (ref 65–100)
GLUCOSE BLD STRIP.AUTO-MCNC: 225 MG/DL (ref 65–100)
GLUCOSE BLD STRIP.AUTO-MCNC: 238 MG/DL (ref 65–100)
GLUCOSE BLD STRIP.AUTO-MCNC: 241 MG/DL (ref 65–100)
GLUCOSE BLD STRIP.AUTO-MCNC: 248 MG/DL (ref 65–100)
GLUCOSE BLD STRIP.AUTO-MCNC: 257 MG/DL (ref 65–100)
GLUCOSE BLD STRIP.AUTO-MCNC: 259 MG/DL (ref 65–100)
GLUCOSE BLD STRIP.AUTO-MCNC: 277 MG/DL (ref 65–100)
GLUCOSE BLD STRIP.AUTO-MCNC: 281 MG/DL (ref 65–100)
GLUCOSE BLD STRIP.AUTO-MCNC: 288 MG/DL (ref 65–100)
GLUCOSE BLD STRIP.AUTO-MCNC: 338 MG/DL (ref 65–100)
GLUCOSE BLD STRIP.AUTO-MCNC: 345 MG/DL (ref 65–100)
GLUCOSE BLD STRIP.AUTO-MCNC: 400 MG/DL (ref 65–100)
GLUCOSE BLD STRIP.AUTO-MCNC: 423 MG/DL (ref 65–100)
GLUCOSE BLD STRIP.AUTO-MCNC: 432 MG/DL (ref 65–100)
GLUCOSE BLD STRIP.AUTO-MCNC: 455 MG/DL (ref 65–100)
GLUCOSE BLD STRIP.AUTO-MCNC: 490 MG/DL (ref 65–100)
GLUCOSE BLD STRIP.AUTO-MCNC: 511 MG/DL (ref 65–100)
GLUCOSE BLD STRIP.AUTO-MCNC: 550 MG/DL (ref 65–100)
GLUCOSE BLD STRIP.AUTO-MCNC: 563 MG/DL (ref 65–100)
GLUCOSE BLD STRIP.AUTO-MCNC: 573 MG/DL (ref 65–100)
GLUCOSE BLD STRIP.AUTO-MCNC: 88 MG/DL (ref 65–100)
GLUCOSE BLD STRIP.AUTO-MCNC: 95 MG/DL (ref 65–100)
GLUCOSE BLD STRIP.AUTO-MCNC: 98 MG/DL (ref 65–100)
GLUCOSE BLD STRIP.AUTO-MCNC: >600 MG/DL (ref 65–100)
GLUCOSE BLD-MCNC: >700 MG/DL (ref 65–100)
GLUCOSE SERPL-MCNC: 1171 MG/DL (ref 65–100)
GLUCOSE SERPL-MCNC: 1224 MG/DL (ref 65–100)
GLUCOSE SERPL-MCNC: 1262 MG/DL (ref 65–100)
GLUCOSE SERPL-MCNC: 130 MG/DL (ref 65–100)
GLUCOSE SERPL-MCNC: 1384 MG/DL (ref 65–100)
GLUCOSE SERPL-MCNC: 1491 MG/DL (ref 65–100)
GLUCOSE SERPL-MCNC: 156 MG/DL (ref 65–100)
GLUCOSE SERPL-MCNC: 209 MG/DL (ref 65–100)
GLUCOSE SERPL-MCNC: 212 MG/DL (ref 65–100)
GLUCOSE SERPL-MCNC: 213 MG/DL (ref 65–100)
GLUCOSE SERPL-MCNC: 282 MG/DL (ref 65–100)
GLUCOSE SERPL-MCNC: 312 MG/DL (ref 65–100)
GLUCOSE SERPL-MCNC: 354 MG/DL (ref 65–100)
GLUCOSE SERPL-MCNC: 401 MG/DL (ref 65–100)
GLUCOSE SERPL-MCNC: 403 MG/DL (ref 65–100)
GLUCOSE SERPL-MCNC: 434 MG/DL (ref 65–100)
GLUCOSE SERPL-MCNC: 463 MG/DL (ref 65–100)
GLUCOSE SERPL-MCNC: 638 MG/DL (ref 65–100)
GLUCOSE SERPL-MCNC: 722 MG/DL (ref 65–100)
GLUCOSE SERPL-MCNC: 959 MG/DL (ref 65–100)
GLUCOSE UR STRIP.AUTO-MCNC: >1000 MG/DL
GLUCOSE, GLC: 101 MG/DL
GLUCOSE, GLC: 104 MG/DL
GLUCOSE, GLC: 105 MG/DL
GLUCOSE, GLC: 107 MG/DL
GLUCOSE, GLC: 108 MG/DL
GLUCOSE, GLC: 110 MG/DL
GLUCOSE, GLC: 112 MG/DL
GLUCOSE, GLC: 112 MG/DL
GLUCOSE, GLC: 115 MG/DL
GLUCOSE, GLC: 116 MG/DL
GLUCOSE, GLC: 117 MG/DL
GLUCOSE, GLC: 123 MG/DL
GLUCOSE, GLC: 131 MG/DL
GLUCOSE, GLC: 131 MG/DL
GLUCOSE, GLC: 132 MG/DL
GLUCOSE, GLC: 136 MG/DL
GLUCOSE, GLC: 145 MG/DL
GLUCOSE, GLC: 146 MG/DL
GLUCOSE, GLC: 146 MG/DL
GLUCOSE, GLC: 154 MG/DL
GLUCOSE, GLC: 156 MG/DL
GLUCOSE, GLC: 156 MG/DL
GLUCOSE, GLC: 157 MG/DL
GLUCOSE, GLC: 159 MG/DL
GLUCOSE, GLC: 172 MG/DL
GLUCOSE, GLC: 173 MG/DL
GLUCOSE, GLC: 174 MG/DL
GLUCOSE, GLC: 176 MG/DL
GLUCOSE, GLC: 181 MG/DL
GLUCOSE, GLC: 186 MG/DL
GLUCOSE, GLC: 199 MG/DL
GLUCOSE, GLC: 205 MG/DL
GLUCOSE, GLC: 212 MG/DL
GLUCOSE, GLC: 216 MG/DL
GLUCOSE, GLC: 217 MG/DL
GLUCOSE, GLC: 225 MG/DL
GLUCOSE, GLC: 238 MG/DL
GLUCOSE, GLC: 241 MG/DL
GLUCOSE, GLC: 248 MG/DL
GLUCOSE, GLC: 257 MG/DL
GLUCOSE, GLC: 259 MG/DL
GLUCOSE, GLC: 277 MG/DL
GLUCOSE, GLC: 281 MG/DL
GLUCOSE, GLC: 288 MG/DL
GLUCOSE, GLC: 338 MG/DL
GLUCOSE, GLC: 345 MG/DL
GLUCOSE, GLC: 400 MG/DL
GLUCOSE, GLC: 423 MG/DL
GLUCOSE, GLC: 432 MG/DL
GLUCOSE, GLC: 490 MG/DL
GLUCOSE, GLC: 511 MG/DL
GLUCOSE, GLC: 550 MG/DL
GLUCOSE, GLC: 563 MG/DL
GLUCOSE, GLC: 573 MG/DL
GLUCOSE, GLC: 600 MG/DL
GLUCOSE, GLC: 88 MG/DL
GLUCOSE, GLC: 95 MG/DL
GLUCOSE, GLC: 98 MG/DL
HBA1C MFR BLD: 12.8 % (ref 4–5.6)
HBV SURFACE AG SER QL: <0.1 INDEX
HBV SURFACE AG SER QL: NEGATIVE
HCO3 BLD-SCNC: 12 MMOL/L (ref 22–26)
HCO3 BLD-SCNC: 12 MMOL/L (ref 22–26)
HCO3 BLD-SCNC: 13.2 MMOL/L (ref 22–26)
HCO3 BLD-SCNC: 16.4 MMOL/L (ref 22–26)
HCT VFR BLD AUTO: 36.4 % (ref 36.6–50.3)
HCT VFR BLD AUTO: 38.5 % (ref 36.6–50.3)
HCT VFR BLD AUTO: 39.7 % (ref 36.6–50.3)
HCT VFR BLD AUTO: 45.6 % (ref 36.6–50.3)
HCT VFR BLD AUTO: 48.3 % (ref 36.6–50.3)
HCT VFR BLD CALC: 47 % (ref 36.6–50.3)
HGB BLD-MCNC: 11.3 G/DL (ref 12.1–17)
HGB BLD-MCNC: 13 G/DL (ref 12.1–17)
HGB BLD-MCNC: 13.4 G/DL (ref 12.1–17)
HGB BLD-MCNC: 14.9 G/DL (ref 12.1–17)
HGB BLD-MCNC: 15 G/DL (ref 12.1–17)
HGB UR QL STRIP: NEGATIVE
HIGH TARGET, HITG: 250 MG/DL
HYALINE CASTS URNS QL MICRO: ABNORMAL /LPF (ref 0–5)
IMM GRANULOCYTES # BLD AUTO: 0 K/UL (ref 0–0.04)
IMM GRANULOCYTES NFR BLD AUTO: 0 % (ref 0–0.5)
INR PPP: 1.1 (ref 0.9–1.1)
INR PPP: 1.2 (ref 0.9–1.1)
INR PPP: 1.3 (ref 0.9–1.1)
INSULIN ADMINSTERED, INSADM: 0 UNITS/HOUR
INSULIN ADMINSTERED, INSADM: 0.1 UNITS/HOUR
INSULIN ADMINSTERED, INSADM: 0.2 UNITS/HOUR
INSULIN ADMINSTERED, INSADM: 0.4 UNITS/HOUR
INSULIN ADMINSTERED, INSADM: 1.3 UNITS/HOUR
INSULIN ADMINSTERED, INSADM: 1.4 UNITS/HOUR
INSULIN ADMINSTERED, INSADM: 1.6 UNITS/HOUR
INSULIN ADMINSTERED, INSADM: 1.9 UNITS/HOUR
INSULIN ADMINSTERED, INSADM: 10 UNITS/HOUR
INSULIN ADMINSTERED, INSADM: 10.3 UNITS/HOUR
INSULIN ADMINSTERED, INSADM: 10.3 UNITS/HOUR
INSULIN ADMINSTERED, INSADM: 10.8 UNITS/HOUR
INSULIN ADMINSTERED, INSADM: 10.9 UNITS/HOUR
INSULIN ADMINSTERED, INSADM: 15.1 UNITS/HOUR
INSULIN ADMINSTERED, INSADM: 16.2 UNITS/HOUR
INSULIN ADMINSTERED, INSADM: 19.2 UNITS/HOUR
INSULIN ADMINSTERED, INSADM: 2 UNITS/HOUR
INSULIN ADMINSTERED, INSADM: 2.1 UNITS/HOUR
INSULIN ADMINSTERED, INSADM: 2.3 UNITS/HOUR
INSULIN ADMINSTERED, INSADM: 2.4 UNITS/HOUR
INSULIN ADMINSTERED, INSADM: 2.6 UNITS/HOUR
INSULIN ADMINSTERED, INSADM: 2.8 UNITS/HOUR
INSULIN ADMINSTERED, INSADM: 21.6 UNITS/HOUR
INSULIN ADMINSTERED, INSADM: 24.7 UNITS/HOUR
INSULIN ADMINSTERED, INSADM: 27 UNITS/HOUR
INSULIN ADMINSTERED, INSADM: 3.8 UNITS/HOUR
INSULIN ADMINSTERED, INSADM: 30.3 UNITS/HOUR
INSULIN ADMINSTERED, INSADM: 32.4 UNITS/HOUR
INSULIN ADMINSTERED, INSADM: 36.9 UNITS/HOUR
INSULIN ADMINSTERED, INSADM: 37.8 UNITS/HOUR
INSULIN ADMINSTERED, INSADM: 4 UNITS/HOUR
INSULIN ADMINSTERED, INSADM: 4.4 UNITS/HOUR
INSULIN ADMINSTERED, INSADM: 4.4 UNITS/HOUR
INSULIN ADMINSTERED, INSADM: 42.8 UNITS/HOUR
INSULIN ADMINSTERED, INSADM: 43.2 UNITS/HOUR
INSULIN ADMINSTERED, INSADM: 43.6 UNITS/HOUR
INSULIN ADMINSTERED, INSADM: 44.5 UNITS/HOUR
INSULIN ADMINSTERED, INSADM: 45.1 UNITS/HOUR
INSULIN ADMINSTERED, INSADM: 47.3 UNITS/HOUR
INSULIN ADMINSTERED, INSADM: 47.6 UNITS/HOUR
INSULIN ADMINSTERED, INSADM: 48.4 UNITS/HOUR
INSULIN ADMINSTERED, INSADM: 48.6 UNITS/HOUR
INSULIN ADMINSTERED, INSADM: 5.3 UNITS/HOUR
INSULIN ADMINSTERED, INSADM: 5.7 UNITS/HOUR
INSULIN ADMINSTERED, INSADM: 54 UNITS/HOUR
INSULIN ADMINSTERED, INSADM: 58.8 UNITS/HOUR
INSULIN ADMINSTERED, INSADM: 59.4 UNITS/HOUR
INSULIN ADMINSTERED, INSADM: 6.9 UNITS/HOUR
INSULIN ADMINSTERED, INSADM: 66.7 UNITS/HOUR
INSULIN ADMINSTERED, INSADM: 7.6 UNITS/HOUR
INSULIN ADMINSTERED, INSADM: 7.7 UNITS/HOUR
INSULIN ADMINSTERED, INSADM: 7.8 UNITS/HOUR
INSULIN ADMINSTERED, INSADM: 9.1 UNITS/HOUR
INSULIN ORDER, INSORD: 0 UNITS/HOUR
INSULIN ORDER, INSORD: 0.1 UNITS/HOUR
INSULIN ORDER, INSORD: 0.2 UNITS/HOUR
INSULIN ORDER, INSORD: 0.4 UNITS/HOUR
INSULIN ORDER, INSORD: 1.3 UNITS/HOUR
INSULIN ORDER, INSORD: 1.4 UNITS/HOUR
INSULIN ORDER, INSORD: 1.6 UNITS/HOUR
INSULIN ORDER, INSORD: 1.9 UNITS/HOUR
INSULIN ORDER, INSORD: 10 UNITS/HOUR
INSULIN ORDER, INSORD: 10.3 UNITS/HOUR
INSULIN ORDER, INSORD: 10.3 UNITS/HOUR
INSULIN ORDER, INSORD: 10.8 UNITS/HOUR
INSULIN ORDER, INSORD: 10.9 UNITS/HOUR
INSULIN ORDER, INSORD: 15.1 UNITS/HOUR
INSULIN ORDER, INSORD: 16.2 UNITS/HOUR
INSULIN ORDER, INSORD: 19.2 UNITS/HOUR
INSULIN ORDER, INSORD: 2 UNITS/HOUR
INSULIN ORDER, INSORD: 2.1 UNITS/HOUR
INSULIN ORDER, INSORD: 2.3 UNITS/HOUR
INSULIN ORDER, INSORD: 2.4 UNITS/HOUR
INSULIN ORDER, INSORD: 2.6 UNITS/HOUR
INSULIN ORDER, INSORD: 2.8 UNITS/HOUR
INSULIN ORDER, INSORD: 21.6 UNITS/HOUR
INSULIN ORDER, INSORD: 24.7 UNITS/HOUR
INSULIN ORDER, INSORD: 27 UNITS/HOUR
INSULIN ORDER, INSORD: 3.8 UNITS/HOUR
INSULIN ORDER, INSORD: 30.3 UNITS/HOUR
INSULIN ORDER, INSORD: 32.4 UNITS/HOUR
INSULIN ORDER, INSORD: 36.9 UNITS/HOUR
INSULIN ORDER, INSORD: 37.8 UNITS/HOUR
INSULIN ORDER, INSORD: 4 UNITS/HOUR
INSULIN ORDER, INSORD: 4.4 UNITS/HOUR
INSULIN ORDER, INSORD: 4.4 UNITS/HOUR
INSULIN ORDER, INSORD: 42.8 UNITS/HOUR
INSULIN ORDER, INSORD: 43.2 UNITS/HOUR
INSULIN ORDER, INSORD: 43.6 UNITS/HOUR
INSULIN ORDER, INSORD: 44.5 UNITS/HOUR
INSULIN ORDER, INSORD: 45.1 UNITS/HOUR
INSULIN ORDER, INSORD: 47.3 UNITS/HOUR
INSULIN ORDER, INSORD: 47.6 UNITS/HOUR
INSULIN ORDER, INSORD: 48.4 UNITS/HOUR
INSULIN ORDER, INSORD: 48.6 UNITS/HOUR
INSULIN ORDER, INSORD: 5.3 UNITS/HOUR
INSULIN ORDER, INSORD: 5.7 UNITS/HOUR
INSULIN ORDER, INSORD: 54 UNITS/HOUR
INSULIN ORDER, INSORD: 58.8 UNITS/HOUR
INSULIN ORDER, INSORD: 59.4 UNITS/HOUR
INSULIN ORDER, INSORD: 6.9 UNITS/HOUR
INSULIN ORDER, INSORD: 66.7 UNITS/HOUR
INSULIN ORDER, INSORD: 7.6 UNITS/HOUR
INSULIN ORDER, INSORD: 7.7 UNITS/HOUR
INSULIN ORDER, INSORD: 7.8 UNITS/HOUR
INSULIN ORDER, INSORD: 9.1 UNITS/HOUR
KETONES UR QL STRIP.AUTO: ABNORMAL MG/DL
LACTATE BLD-SCNC: 3.55 MMOL/L (ref 0.4–2)
LACTATE SERPL-SCNC: 3.2 MMOL/L (ref 0.4–2)
LACTATE SERPL-SCNC: 5 MMOL/L (ref 0.4–2)
LACTATE SERPL-SCNC: 5.2 MMOL/L (ref 0.4–2)
LEUKOCYTE ESTERASE UR QL STRIP.AUTO: NEGATIVE
LOW TARGET, LOT: 150 MG/DL
LVFS 2D: 48.85 %
LVSV (TEICH): 30.43 ML
LYMPHOCYTES # BLD: 0.8 K/UL (ref 0.8–3.5)
LYMPHOCYTES # BLD: 1 K/UL (ref 0.8–3.5)
LYMPHOCYTES # BLD: 1 K/UL (ref 0.8–3.5)
LYMPHOCYTES # BLD: 1.5 K/UL (ref 0.8–3.5)
LYMPHOCYTES NFR BLD: 5 % (ref 12–49)
LYMPHOCYTES NFR BLD: 6 % (ref 12–49)
LYMPHOCYTES NFR BLD: 7 % (ref 12–49)
LYMPHOCYTES NFR BLD: 8 % (ref 12–49)
MAGNESIUM SERPL-MCNC: 2 MG/DL (ref 1.6–2.4)
MAGNESIUM SERPL-MCNC: 2.3 MG/DL (ref 1.6–2.4)
MAGNESIUM SERPL-MCNC: 2.4 MG/DL (ref 1.6–2.4)
MAGNESIUM SERPL-MCNC: 2.4 MG/DL (ref 1.6–2.4)
MAGNESIUM SERPL-MCNC: 2.5 MG/DL (ref 1.6–2.4)
MAGNESIUM SERPL-MCNC: 2.5 MG/DL (ref 1.6–2.4)
MAGNESIUM SERPL-MCNC: 2.6 MG/DL (ref 1.6–2.4)
MAGNESIUM SERPL-MCNC: 2.9 MG/DL (ref 1.6–2.4)
MAGNESIUM SERPL-MCNC: 3 MG/DL (ref 1.6–2.4)
MAGNESIUM SERPL-MCNC: 3.1 MG/DL (ref 1.6–2.4)
MAGNESIUM SERPL-MCNC: 3.2 MG/DL (ref 1.6–2.4)
MAGNESIUM SERPL-MCNC: 3.3 MG/DL (ref 1.6–2.4)
MAGNESIUM SERPL-MCNC: 3.5 MG/DL (ref 1.6–2.4)
MAGNESIUM SERPL-MCNC: 3.5 MG/DL (ref 1.6–2.4)
MAGNESIUM SERPL-MCNC: 3.9 MG/DL (ref 1.6–2.4)
MAGNESIUM SERPL-MCNC: 4.2 MG/DL (ref 1.6–2.4)
MCH RBC QN AUTO: 27 PG (ref 26–34)
MCH RBC QN AUTO: 27.2 PG (ref 26–34)
MCH RBC QN AUTO: 27.3 PG (ref 26–34)
MCH RBC QN AUTO: 27.4 PG (ref 26–34)
MCH RBC QN AUTO: 27.4 PG (ref 26–34)
MCHC RBC AUTO-ENTMCNC: 30.8 G/DL (ref 30–36.5)
MCHC RBC AUTO-ENTMCNC: 31 G/DL (ref 30–36.5)
MCHC RBC AUTO-ENTMCNC: 32.9 G/DL (ref 30–36.5)
MCHC RBC AUTO-ENTMCNC: 33.8 G/DL (ref 30–36.5)
MCHC RBC AUTO-ENTMCNC: 33.8 G/DL (ref 30–36.5)
MCV RBC AUTO: 81 FL (ref 80–99)
MCV RBC AUTO: 81.1 FL (ref 80–99)
MCV RBC AUTO: 82.2 FL (ref 80–99)
MCV RBC AUTO: 87.5 FL (ref 80–99)
MCV RBC AUTO: 88.8 FL (ref 80–99)
METHADONE UR QL: NEGATIVE
MINUTES UNTIL NEXT BG, NBG: 120 MIN
MINUTES UNTIL NEXT BG, NBG: 120 MIN
MINUTES UNTIL NEXT BG, NBG: 60 MIN
MONOCYTES # BLD: 0.2 K/UL (ref 0–1)
MONOCYTES # BLD: 0.3 K/UL (ref 0–1)
MONOCYTES # BLD: 0.3 K/UL (ref 0–1)
MONOCYTES # BLD: 0.6 K/UL (ref 0–1)
MONOCYTES NFR BLD: 1 % (ref 5–13)
MONOCYTES NFR BLD: 2 % (ref 5–13)
MONOCYTES NFR BLD: 2 % (ref 5–13)
MONOCYTES NFR BLD: 3 % (ref 5–13)
MULTIPLIER, MUL: 0
MULTIPLIER, MUL: 0.01
MULTIPLIER, MUL: 0.02
MULTIPLIER, MUL: 0.02
MULTIPLIER, MUL: 0.03
MULTIPLIER, MUL: 0.04
MULTIPLIER, MUL: 0.05
MULTIPLIER, MUL: 0.06
MULTIPLIER, MUL: 0.07
MULTIPLIER, MUL: 0.07
MULTIPLIER, MUL: 0.08
MULTIPLIER, MUL: 0.09
MULTIPLIER, MUL: 0.09
MULTIPLIER, MUL: 0.1
MULTIPLIER, MUL: 0.1
MULTIPLIER, MUL: 0.11
MULTIPLIER, MUL: 0.12
MULTIPLIER, MUL: 0.12
MULTIPLIER, MUL: 0.13
MULTIPLIER, MUL: 0.13
MULTIPLIER, MUL: 0.14
MULTIPLIER, MUL: 0.14
MULTIPLIER, MUL: 0.15
MULTIPLIER, MUL: 0.16
MULTIPLIER, MUL: 0.17
MV DEC SLOPE: 1.15
MYELOCYTES NFR BLD MANUAL: 3 %
NEUTS BAND NFR BLD MANUAL: 13 %
NEUTS BAND NFR BLD MANUAL: 28 %
NEUTS BAND NFR BLD MANUAL: 3 %
NEUTS SEG # BLD: 13.1 K/UL (ref 1.8–8)
NEUTS SEG # BLD: 14.8 K/UL (ref 1.8–8)
NEUTS SEG # BLD: 15.6 K/UL (ref 1.8–8)
NEUTS SEG # BLD: 16.4 K/UL (ref 1.8–8)
NEUTS SEG NFR BLD: 63 % (ref 32–75)
NEUTS SEG NFR BLD: 75 % (ref 32–75)
NEUTS SEG NFR BLD: 88 % (ref 32–75)
NEUTS SEG NFR BLD: 92 % (ref 32–75)
NITRITE UR QL STRIP.AUTO: NEGATIVE
NRBC # BLD: 0 K/UL (ref 0–0.01)
NRBC # BLD: 0 K/UL (ref 0–0.01)
NRBC # BLD: 0.04 K/UL (ref 0–0.01)
NRBC # BLD: 0.07 K/UL (ref 0–0.01)
NRBC # BLD: 0.33 K/UL (ref 0–0.01)
NRBC BLD-RTO: 0 PER 100 WBC
NRBC BLD-RTO: 0 PER 100 WBC
NRBC BLD-RTO: 0.2 PER 100 WBC
NRBC BLD-RTO: 0.5 PER 100 WBC
NRBC BLD-RTO: 2 PER 100 WBC
O2/TOTAL GAS SETTING VFR VENT: 100 %
O2/TOTAL GAS SETTING VFR VENT: 40 %
O2/TOTAL GAS SETTING VFR VENT: 50 %
O2/TOTAL GAS SETTING VFR VENT: 50 %
OPIATES UR QL: NEGATIVE
ORDER INITIALS, ORDINIT: NORMAL
P-R INTERVAL, ECG05: 136 MS
PCO2 BLD: 22.2 MMHG (ref 35–45)
PCO2 BLD: 25.1 MMHG (ref 35–45)
PCO2 BLD: 31.7 MMHG (ref 35–45)
PCO2 BLD: 32.3 MMHG (ref 35–45)
PCP UR QL: NEGATIVE
PEEP RESPIRATORY: 6 CMH2O
PEEP RESPIRATORY: 6 CMH2O
PEEP RESPIRATORY: 8 CMH2O
PEEP RESPIRATORY: 8 CMH2O
PH BLD: 7.18 [PH] (ref 7.35–7.45)
PH BLD: 7.29 [PH] (ref 7.35–7.45)
PH BLD: 7.31 [PH] (ref 7.35–7.45)
PH BLD: 7.38 [PH] (ref 7.35–7.45)
PH UR STRIP: 5 [PH] (ref 5–8)
PHOSPHATE SERPL-MCNC: 10 MG/DL (ref 2.6–4.7)
PHOSPHATE SERPL-MCNC: 4.7 MG/DL (ref 2.6–4.7)
PHOSPHATE SERPL-MCNC: 4.7 MG/DL (ref 2.6–4.7)
PHOSPHATE SERPL-MCNC: 6.1 MG/DL (ref 2.6–4.7)
PHOSPHATE SERPL-MCNC: 6.1 MG/DL (ref 2.6–4.7)
PHOSPHATE SERPL-MCNC: 7.5 MG/DL (ref 2.6–4.7)
PHOSPHATE SERPL-MCNC: 7.7 MG/DL (ref 2.6–4.7)
PHOSPHATE SERPL-MCNC: >13.5 MG/DL (ref 2.6–4.7)
PLATELET # BLD AUTO: 139 K/UL (ref 150–400)
PLATELET # BLD AUTO: 189 K/UL (ref 150–400)
PLATELET # BLD AUTO: 37 K/UL (ref 150–400)
PLATELET # BLD AUTO: 56 K/UL (ref 150–400)
PLATELET # BLD AUTO: 67 K/UL (ref 150–400)
PLATELET # BLD AUTO: 79 K/UL (ref 150–400)
PMV BLD AUTO: 12 FL (ref 8.9–12.9)
PMV BLD AUTO: 12.5 FL (ref 8.9–12.9)
PMV BLD AUTO: 12.5 FL (ref 8.9–12.9)
PMV BLD AUTO: 12.8 FL (ref 8.9–12.9)
PO2 BLD: 138 MMHG (ref 80–100)
PO2 BLD: 152 MMHG (ref 80–100)
PO2 BLD: 234 MMHG (ref 80–100)
PO2 BLD: 563 MMHG (ref 80–100)
POTASSIUM BLD-SCNC: 5.1 MMOL/L (ref 3.5–5.1)
POTASSIUM SERPL-SCNC: 2.4 MMOL/L (ref 3.5–5.1)
POTASSIUM SERPL-SCNC: 3.1 MMOL/L (ref 3.5–5.1)
POTASSIUM SERPL-SCNC: 3.6 MMOL/L (ref 3.5–5.1)
POTASSIUM SERPL-SCNC: 3.7 MMOL/L (ref 3.5–5.1)
POTASSIUM SERPL-SCNC: 3.9 MMOL/L (ref 3.5–5.1)
POTASSIUM SERPL-SCNC: 3.9 MMOL/L (ref 3.5–5.1)
POTASSIUM SERPL-SCNC: 4 MMOL/L (ref 3.5–5.1)
POTASSIUM SERPL-SCNC: 4.1 MMOL/L (ref 3.5–5.1)
POTASSIUM SERPL-SCNC: 4.2 MMOL/L (ref 3.5–5.1)
POTASSIUM SERPL-SCNC: 4.2 MMOL/L (ref 3.5–5.1)
POTASSIUM SERPL-SCNC: 4.3 MMOL/L (ref 3.5–5.1)
POTASSIUM SERPL-SCNC: 4.5 MMOL/L (ref 3.5–5.1)
POTASSIUM SERPL-SCNC: 5.1 MMOL/L (ref 3.5–5.1)
POTASSIUM SERPL-SCNC: 5.9 MMOL/L (ref 3.5–5.1)
POTASSIUM SERPL-SCNC: 6.2 MMOL/L (ref 3.5–5.1)
POTASSIUM SERPL-SCNC: 7.8 MMOL/L (ref 3.5–5.1)
PROCALCITONIN SERPL-MCNC: 3.12 NG/ML
PROCALCITONIN SERPL-MCNC: 9.12 NG/ML
PROT SERPL-MCNC: 5.8 G/DL (ref 6.4–8.2)
PROT SERPL-MCNC: 6 G/DL (ref 6.4–8.2)
PROT SERPL-MCNC: 6.9 G/DL (ref 6.4–8.2)
PROT SERPL-MCNC: 7.4 G/DL (ref 6.4–8.2)
PROT UR STRIP-MCNC: NEGATIVE MG/DL
PROTHROMBIN TIME: 11.4 SEC (ref 9–11.1)
PROTHROMBIN TIME: 12.5 SEC (ref 9–11.1)
PROTHROMBIN TIME: 13.5 SEC (ref 9–11.1)
Q-T INTERVAL, ECG07: 368 MS
QRS DURATION, ECG06: 88 MS
QTC CALCULATION (BEZET), ECG08: 460 MS
RBC # BLD AUTO: 4.16 M/UL (ref 4.1–5.7)
RBC # BLD AUTO: 4.75 M/UL (ref 4.1–5.7)
RBC # BLD AUTO: 4.9 M/UL (ref 4.1–5.7)
RBC # BLD AUTO: 5.44 M/UL (ref 4.1–5.7)
RBC # BLD AUTO: 5.55 M/UL (ref 4.1–5.7)
RBC #/AREA URNS HPF: ABNORMAL /HPF (ref 0–5)
RBC MORPH BLD: ABNORMAL
SALICYLATES SERPL-MCNC: 2.3 MG/DL (ref 2.8–20)
SAMPLES BEING HELD,HOLD: NORMAL
SAO2 % BLD: 100 % (ref 92–97)
SAO2 % BLD: 100 % (ref 92–97)
SAO2 % BLD: 99 % (ref 92–97)
SAO2 % BLD: 99 % (ref 92–97)
SERVICE CMNT-IMP: ABNORMAL
SERVICE CMNT-IMP: NORMAL
SODIUM BLD-SCNC: 148 MMOL/L (ref 136–145)
SODIUM SERPL-SCNC: 144 MMOL/L (ref 136–145)
SODIUM SERPL-SCNC: 146 MMOL/L (ref 136–145)
SODIUM SERPL-SCNC: 147 MMOL/L (ref 136–145)
SODIUM SERPL-SCNC: 148 MMOL/L (ref 136–145)
SODIUM SERPL-SCNC: 148 MMOL/L (ref 136–145)
SODIUM SERPL-SCNC: 150 MMOL/L (ref 136–145)
SODIUM SERPL-SCNC: 151 MMOL/L (ref 136–145)
SODIUM SERPL-SCNC: 152 MMOL/L (ref 136–145)
SODIUM SERPL-SCNC: 154 MMOL/L (ref 136–145)
SODIUM SERPL-SCNC: 156 MMOL/L (ref 136–145)
SODIUM SERPL-SCNC: 158 MMOL/L (ref 136–145)
SODIUM SERPL-SCNC: 158 MMOL/L (ref 136–145)
SODIUM SERPL-SCNC: 159 MMOL/L (ref 136–145)
SODIUM SERPL-SCNC: 160 MMOL/L (ref 136–145)
SODIUM SERPL-SCNC: 160 MMOL/L (ref 136–145)
SODIUM SERPL-SCNC: 161 MMOL/L (ref 136–145)
SODIUM SERPL-SCNC: 162 MMOL/L (ref 136–145)
SODIUM SERPL-SCNC: 164 MMOL/L (ref 136–145)
SP GR UR REFRACTOMETRY: 1.03 (ref 1–1.03)
SPECIMEN TYPE: ABNORMAL
THERAPEUTIC RANGE,PTTT: ABNORMAL SECS (ref 58–77)
THERAPEUTIC RANGE,PTTT: NORMAL SECS (ref 58–77)
TOTAL RESP. RATE, ITRR: 22
TOTAL RESP. RATE, ITRR: 33
TROPONIN I SERPL-MCNC: 0.33 NG/ML
TSH SERPL DL<=0.05 MIU/L-ACNC: 0.45 UIU/ML (ref 0.36–3.74)
UA: UC IF INDICATED,UAUC: ABNORMAL
UROBILINOGEN UR QL STRIP.AUTO: 0.2 EU/DL (ref 0.2–1)
VENTILATION MODE VENT: ABNORMAL
VENTRICULAR RATE, ECG03: 94 BPM
VT SETTING VENT: 500 ML
WBC # BLD AUTO: 14.4 K/UL (ref 4.1–11.1)
WBC # BLD AUTO: 16.3 K/UL (ref 4.1–11.1)
WBC # BLD AUTO: 16.9 K/UL (ref 4.1–11.1)
WBC # BLD AUTO: 17.4 K/UL (ref 4.1–11.1)
WBC # BLD AUTO: 18.7 K/UL (ref 4.1–11.1)
WBC MORPH BLD: ABNORMAL
WBC URNS QL MICRO: ABNORMAL /HPF (ref 0–4)

## 2020-01-01 PROCEDURE — 74640000003 HC CRRT SET UP OR EXCHANGE

## 2020-01-01 PROCEDURE — 85730 THROMBOPLASTIN TIME PARTIAL: CPT

## 2020-01-01 PROCEDURE — 90945 DIALYSIS ONE EVALUATION: CPT

## 2020-01-01 PROCEDURE — 74011250637 HC RX REV CODE- 250/637: Performed by: INTERNAL MEDICINE

## 2020-01-01 PROCEDURE — 74011000250 HC RX REV CODE- 250: Performed by: INTERNAL MEDICINE

## 2020-01-01 PROCEDURE — 36600 WITHDRAWAL OF ARTERIAL BLOOD: CPT

## 2020-01-01 PROCEDURE — 65610000006 HC RM INTENSIVE CARE

## 2020-01-01 PROCEDURE — 85610 PROTHROMBIN TIME: CPT

## 2020-01-01 PROCEDURE — 80047 BASIC METABLC PNL IONIZED CA: CPT

## 2020-01-01 PROCEDURE — 71045 X-RAY EXAM CHEST 1 VIEW: CPT

## 2020-01-01 PROCEDURE — 74011250636 HC RX REV CODE- 250/636: Performed by: INTERNAL MEDICINE

## 2020-01-01 PROCEDURE — 94003 VENT MGMT INPAT SUBQ DAY: CPT

## 2020-01-01 PROCEDURE — 82803 BLOOD GASES ANY COMBINATION: CPT

## 2020-01-01 PROCEDURE — 83605 ASSAY OF LACTIC ACID: CPT

## 2020-01-01 PROCEDURE — 83735 ASSAY OF MAGNESIUM: CPT

## 2020-01-01 PROCEDURE — 85384 FIBRINOGEN ACTIVITY: CPT

## 2020-01-01 PROCEDURE — 84484 ASSAY OF TROPONIN QUANT: CPT

## 2020-01-01 PROCEDURE — 36415 COLL VENOUS BLD VENIPUNCTURE: CPT

## 2020-01-01 PROCEDURE — 74011250636 HC RX REV CODE- 250/636: Performed by: FAMILY MEDICINE

## 2020-01-01 PROCEDURE — 74011000258 HC RX REV CODE- 258: Performed by: INTERNAL MEDICINE

## 2020-01-01 PROCEDURE — 77030040132 HC BLANKET THRM DISP CSZ -B

## 2020-01-01 PROCEDURE — 85027 COMPLETE CBC AUTOMATED: CPT

## 2020-01-01 PROCEDURE — 77030008683 HC TU ET CUF COVD -A

## 2020-01-01 PROCEDURE — C1751 CATH, INF, PER/CENT/MIDLINE: HCPCS

## 2020-01-01 PROCEDURE — 80053 COMPREHEN METABOLIC PANEL: CPT

## 2020-01-01 PROCEDURE — 77030008771 HC TU NG SALEM SUMP -A

## 2020-01-01 PROCEDURE — 74011250636 HC RX REV CODE- 250/636: Performed by: EMERGENCY MEDICINE

## 2020-01-01 PROCEDURE — 94002 VENT MGMT INPAT INIT DAY: CPT

## 2020-01-01 PROCEDURE — 80048 BASIC METABOLIC PNL TOTAL CA: CPT

## 2020-01-01 PROCEDURE — 93005 ELECTROCARDIOGRAM TRACING: CPT

## 2020-01-01 PROCEDURE — 74011000250 HC RX REV CODE- 250: Performed by: FAMILY MEDICINE

## 2020-01-01 PROCEDURE — 74011000258 HC RX REV CODE- 258: Performed by: EMERGENCY MEDICINE

## 2020-01-01 PROCEDURE — 51702 INSERT TEMP BLADDER CATH: CPT

## 2020-01-01 PROCEDURE — 74011000250 HC RX REV CODE- 250

## 2020-01-01 PROCEDURE — 95816 EEG AWAKE AND DROWSY: CPT | Performed by: PSYCHIATRY & NEUROLOGY

## 2020-01-01 PROCEDURE — C1752 CATH,HEMODIALYSIS,SHORT-TERM: HCPCS

## 2020-01-01 PROCEDURE — 74011250636 HC RX REV CODE- 250/636: Performed by: RADIOLOGY

## 2020-01-01 PROCEDURE — 85049 AUTOMATED PLATELET COUNT: CPT

## 2020-01-01 PROCEDURE — 84100 ASSAY OF PHOSPHORUS: CPT

## 2020-01-01 PROCEDURE — 99285 EMERGENCY DEPT VISIT HI MDM: CPT

## 2020-01-01 PROCEDURE — 82962 GLUCOSE BLOOD TEST: CPT

## 2020-01-01 PROCEDURE — 80307 DRUG TEST PRSMV CHEM ANLYZR: CPT

## 2020-01-01 PROCEDURE — 81001 URINALYSIS AUTO W/SCOPE: CPT

## 2020-01-01 PROCEDURE — 84443 ASSAY THYROID STIM HORMONE: CPT

## 2020-01-01 PROCEDURE — 80069 RENAL FUNCTION PANEL: CPT

## 2020-01-01 PROCEDURE — 87040 BLOOD CULTURE FOR BACTERIA: CPT

## 2020-01-01 PROCEDURE — 85025 COMPLETE CBC W/AUTO DIFF WBC: CPT

## 2020-01-01 PROCEDURE — 92950 HEART/LUNG RESUSCITATION CPR: CPT

## 2020-01-01 PROCEDURE — 02HV33Z INSERTION OF INFUSION DEVICE INTO SUPERIOR VENA CAVA, PERCUTANEOUS APPROACH: ICD-10-PCS | Performed by: RADIOLOGY

## 2020-01-01 PROCEDURE — 96374 THER/PROPH/DIAG INJ IV PUSH: CPT

## 2020-01-01 PROCEDURE — 77030008477 HC STYL SATN SLP COVD -A

## 2020-01-01 PROCEDURE — 77030018729 HC ELECTRD DEFIB PAD CARD -B

## 2020-01-01 PROCEDURE — 82550 ASSAY OF CK (CPK): CPT

## 2020-01-01 PROCEDURE — 76937 US GUIDE VASCULAR ACCESS: CPT

## 2020-01-01 PROCEDURE — 93306 TTE W/DOPPLER COMPLETE: CPT

## 2020-01-01 PROCEDURE — 5A1945Z RESPIRATORY VENTILATION, 24-96 CONSECUTIVE HOURS: ICD-10-PCS | Performed by: HOSPITALIST

## 2020-01-01 PROCEDURE — 74011636637 HC RX REV CODE- 636/637: Performed by: INTERNAL MEDICINE

## 2020-01-01 PROCEDURE — 5A2204Z RESTORATION OF CARDIAC RHYTHM, SINGLE: ICD-10-PCS | Performed by: HOSPITALIST

## 2020-01-01 PROCEDURE — 84145 PROCALCITONIN (PCT): CPT

## 2020-01-01 PROCEDURE — 77030040361 HC SLV COMPR DVT MDII -B

## 2020-01-01 PROCEDURE — 74011000250 HC RX REV CODE- 250: Performed by: EMERGENCY MEDICINE

## 2020-01-01 PROCEDURE — 86022 PLATELET ANTIBODIES: CPT

## 2020-01-01 PROCEDURE — 82140 ASSAY OF AMMONIA: CPT

## 2020-01-01 PROCEDURE — 31500 INSERT EMERGENCY AIRWAY: CPT

## 2020-01-01 PROCEDURE — 74011636637 HC RX REV CODE- 636/637: Performed by: EMERGENCY MEDICINE

## 2020-01-01 PROCEDURE — 76770 US EXAM ABDO BACK WALL COMP: CPT

## 2020-01-01 PROCEDURE — 82947 ASSAY GLUCOSE BLOOD QUANT: CPT

## 2020-01-01 PROCEDURE — 96375 TX/PRO/DX INJ NEW DRUG ADDON: CPT

## 2020-01-01 PROCEDURE — 82542 COL CHROMOTOGRAPHY QUAL/QUAN: CPT

## 2020-01-01 PROCEDURE — 5A1D70Z PERFORMANCE OF URINARY FILTRATION, INTERMITTENT, LESS THAN 6 HOURS PER DAY: ICD-10-PCS | Performed by: HOSPITALIST

## 2020-01-01 PROCEDURE — 77030034537

## 2020-01-01 PROCEDURE — 0BH17EZ INSERTION OF ENDOTRACHEAL AIRWAY INTO TRACHEA, VIA NATURAL OR ARTIFICIAL OPENING: ICD-10-PCS | Performed by: HOSPITALIST

## 2020-01-01 PROCEDURE — 70450 CT HEAD/BRAIN W/O DYE: CPT

## 2020-01-01 PROCEDURE — 87804 INFLUENZA ASSAY W/OPTIC: CPT

## 2020-01-01 PROCEDURE — 74011000250 HC RX REV CODE- 250: Performed by: RADIOLOGY

## 2020-01-01 PROCEDURE — 87340 HEPATITIS B SURFACE AG IA: CPT

## 2020-01-01 PROCEDURE — 5A12012 PERFORMANCE OF CARDIAC OUTPUT, SINGLE, MANUAL: ICD-10-PCS | Performed by: HOSPITALIST

## 2020-01-01 PROCEDURE — 83036 HEMOGLOBIN GLYCOSYLATED A1C: CPT

## 2020-01-01 PROCEDURE — C1892 INTRO/SHEATH,FIXED,PEEL-AWAY: HCPCS

## 2020-01-01 PROCEDURE — 96365 THER/PROPH/DIAG IV INF INIT: CPT

## 2020-01-01 RX ORDER — SODIUM CHLORIDE 450 MG/100ML
150 INJECTION, SOLUTION INTRAVENOUS CONTINUOUS
Status: DISCONTINUED | OUTPATIENT
Start: 2020-01-01 | End: 2020-01-01

## 2020-01-01 RX ORDER — ONDANSETRON 2 MG/ML
4 INJECTION INTRAMUSCULAR; INTRAVENOUS
Status: DISCONTINUED | OUTPATIENT
Start: 2020-01-01 | End: 2020-01-01 | Stop reason: HOSPADM

## 2020-01-01 RX ORDER — SODIUM BICARBONATE 84 MG/ML
50 INJECTION, SOLUTION INTRAVENOUS ONCE
Status: COMPLETED | OUTPATIENT
Start: 2020-01-01 | End: 2020-01-01

## 2020-01-01 RX ORDER — SODIUM CHLORIDE 0.9 % (FLUSH) 0.9 %
5-40 SYRINGE (ML) INJECTION EVERY 8 HOURS
Status: DISCONTINUED | OUTPATIENT
Start: 2020-01-01 | End: 2020-01-01 | Stop reason: HOSPADM

## 2020-01-01 RX ORDER — CALCIUM CHLORIDE INJECTION 100 MG/ML
INJECTION, SOLUTION INTRAVENOUS
Status: COMPLETED | OUTPATIENT
Start: 2020-01-01 | End: 2020-01-01

## 2020-01-01 RX ORDER — ACETAMINOPHEN 650 MG/1
650 SUPPOSITORY RECTAL
Status: DISCONTINUED | OUTPATIENT
Start: 2020-01-01 | End: 2020-01-01

## 2020-01-01 RX ORDER — NOREPINEPHRINE BITARTRATE/D5W 8 MG/250ML
2-100 PLASTIC BAG, INJECTION (ML) INTRAVENOUS
Status: DISCONTINUED | OUTPATIENT
Start: 2020-01-01 | End: 2020-01-01 | Stop reason: HOSPADM

## 2020-01-01 RX ORDER — HEPARIN 100 UNIT/ML
300 SYRINGE INTRAVENOUS AS NEEDED
Status: DISCONTINUED | OUTPATIENT
Start: 2020-01-01 | End: 2020-01-01 | Stop reason: HOSPADM

## 2020-01-01 RX ORDER — SODIUM CHLORIDE 9 MG/ML
150 INJECTION, SOLUTION INTRAVENOUS CONTINUOUS
Status: DISCONTINUED | OUTPATIENT
Start: 2020-01-01 | End: 2020-01-01

## 2020-01-01 RX ORDER — MAGNESIUM SULFATE 100 %
4 CRYSTALS MISCELLANEOUS AS NEEDED
Status: DISCONTINUED | OUTPATIENT
Start: 2020-01-01 | End: 2020-01-01 | Stop reason: SDUPTHER

## 2020-01-01 RX ORDER — EPINEPHRINE 0.1 MG/ML
INJECTION INTRACARDIAC; INTRAVENOUS
Status: COMPLETED | OUTPATIENT
Start: 2020-01-01 | End: 2020-01-01

## 2020-01-01 RX ORDER — MAGNESIUM SULFATE 100 %
4 CRYSTALS MISCELLANEOUS AS NEEDED
Status: DISCONTINUED | OUTPATIENT
Start: 2020-01-01 | End: 2020-01-01 | Stop reason: HOSPADM

## 2020-01-01 RX ORDER — DEXTROSE MONOHYDRATE 100 MG/ML
0-250 INJECTION, SOLUTION INTRAVENOUS AS NEEDED
Status: DISCONTINUED | OUTPATIENT
Start: 2020-01-01 | End: 2020-01-01 | Stop reason: HOSPADM

## 2020-01-01 RX ORDER — MAGNESIUM SULFATE HEPTAHYDRATE 40 MG/ML
2 INJECTION, SOLUTION INTRAVENOUS
Status: DISCONTINUED | OUTPATIENT
Start: 2020-01-01 | End: 2020-01-01 | Stop reason: HOSPADM

## 2020-01-01 RX ORDER — SODIUM CHLORIDE 0.9 % (FLUSH) 0.9 %
5-40 SYRINGE (ML) INJECTION AS NEEDED
Status: DISCONTINUED | OUTPATIENT
Start: 2020-01-01 | End: 2020-01-01 | Stop reason: HOSPADM

## 2020-01-01 RX ORDER — DEXTROSE MONOHYDRATE AND SODIUM CHLORIDE 5; .45 G/100ML; G/100ML
125 INJECTION, SOLUTION INTRAVENOUS CONTINUOUS
Status: DISCONTINUED | OUTPATIENT
Start: 2020-01-01 | End: 2020-01-01

## 2020-01-01 RX ORDER — SODIUM BICARBONATE 1 MEQ/ML
SYRINGE (ML) INTRAVENOUS
Status: COMPLETED | OUTPATIENT
Start: 2020-01-01 | End: 2020-01-01

## 2020-01-01 RX ORDER — POTASSIUM CHLORIDE 29.8 MG/ML
20 INJECTION INTRAVENOUS ONCE
Status: COMPLETED | OUTPATIENT
Start: 2020-01-01 | End: 2020-01-01

## 2020-01-01 RX ORDER — BALSAM PERU/CASTOR OIL
OINTMENT (GRAM) TOPICAL 2 TIMES DAILY
Status: DISCONTINUED | OUTPATIENT
Start: 2020-01-01 | End: 2020-01-01 | Stop reason: HOSPADM

## 2020-01-01 RX ORDER — ROCURONIUM BROMIDE 10 MG/ML
100 INJECTION, SOLUTION INTRAVENOUS
Status: DISCONTINUED | OUTPATIENT
Start: 2020-01-01 | End: 2020-01-01 | Stop reason: HOSPADM

## 2020-01-01 RX ORDER — SODIUM CHLORIDE 0.9 % (FLUSH) 0.9 %
5-10 SYRINGE (ML) INJECTION AS NEEDED
Status: DISCONTINUED | OUTPATIENT
Start: 2020-01-01 | End: 2020-01-01 | Stop reason: HOSPADM

## 2020-01-01 RX ORDER — INSULIN LISPRO 100 [IU]/ML
INJECTION, SOLUTION INTRAVENOUS; SUBCUTANEOUS
Status: DISCONTINUED | OUTPATIENT
Start: 2020-01-01 | End: 2020-01-01 | Stop reason: HOSPADM

## 2020-01-01 RX ORDER — SODIUM BICARBONATE 84 MG/ML
100 INJECTION, SOLUTION INTRAVENOUS ONCE
Status: COMPLETED | OUTPATIENT
Start: 2020-01-01 | End: 2020-01-01

## 2020-01-01 RX ORDER — DEXTROSE MONOHYDRATE AND SODIUM CHLORIDE 5; .9 G/100ML; G/100ML
150 INJECTION, SOLUTION INTRAVENOUS CONTINUOUS
Status: DISCONTINUED | OUTPATIENT
Start: 2020-01-01 | End: 2020-01-01

## 2020-01-01 RX ORDER — HEPARIN 100 UNIT/ML
300 SYRINGE INTRAVENOUS ONCE
Status: COMPLETED | OUTPATIENT
Start: 2020-01-01 | End: 2020-01-01

## 2020-01-01 RX ORDER — AMIODARONE HYDROCHLORIDE 150 MG/3ML
INJECTION, SOLUTION INTRAVENOUS
Status: COMPLETED | OUTPATIENT
Start: 2020-01-01 | End: 2020-01-01

## 2020-01-01 RX ORDER — SODIUM BICARBONATE 84 MG/ML
INJECTION, SOLUTION INTRAVENOUS
Status: COMPLETED
Start: 2020-01-01 | End: 2020-01-01

## 2020-01-01 RX ORDER — DEXTROSE MONOHYDRATE AND SODIUM CHLORIDE 5; .45 G/100ML; G/100ML
75 INJECTION, SOLUTION INTRAVENOUS CONTINUOUS
Status: DISCONTINUED | OUTPATIENT
Start: 2020-01-01 | End: 2020-01-01 | Stop reason: HOSPADM

## 2020-01-01 RX ORDER — ETOMIDATE 2 MG/ML
20 INJECTION INTRAVENOUS
Status: DISCONTINUED | OUTPATIENT
Start: 2020-01-01 | End: 2020-01-01 | Stop reason: HOSPADM

## 2020-01-01 RX ORDER — HEPARIN SODIUM 5000 [USP'U]/ML
5000 INJECTION, SOLUTION INTRAVENOUS; SUBCUTANEOUS 2 TIMES DAILY
Status: DISCONTINUED | OUTPATIENT
Start: 2020-01-01 | End: 2020-01-01

## 2020-01-01 RX ORDER — POTASSIUM CHLORIDE 14.9 MG/ML
10 INJECTION INTRAVENOUS
Status: COMPLETED | OUTPATIENT
Start: 2020-01-01 | End: 2020-01-01

## 2020-01-01 RX ORDER — ACETAMINOPHEN 650 MG/1
650 SUPPOSITORY RECTAL
Status: DISCONTINUED | OUTPATIENT
Start: 2020-01-01 | End: 2020-01-01 | Stop reason: HOSPADM

## 2020-01-01 RX ORDER — LIDOCAINE HYDROCHLORIDE 20 MG/ML
20 INJECTION, SOLUTION INFILTRATION; PERINEURAL ONCE
Status: COMPLETED | OUTPATIENT
Start: 2020-01-01 | End: 2020-01-01

## 2020-01-01 RX ORDER — PHENYLEPHRINE HCL IN 0.9% NACL 0.4MG/10ML
100 SYRINGE (ML) INTRAVENOUS
Status: DISCONTINUED | OUTPATIENT
Start: 2020-01-01 | End: 2020-01-01 | Stop reason: HOSPADM

## 2020-01-01 RX ORDER — PHENYLEPHRINE HYDROCHLORIDE 10 MG/ML
100 INJECTION INTRAVENOUS ONCE
Status: DISCONTINUED | OUTPATIENT
Start: 2020-01-01 | End: 2020-01-01

## 2020-01-01 RX ORDER — NOREPINEPHRINE BITARTRATE/D5W 8 MG/250ML
2-200 PLASTIC BAG, INJECTION (ML) INTRAVENOUS
Status: DISCONTINUED | OUTPATIENT
Start: 2020-01-01 | End: 2020-01-01

## 2020-01-01 RX ORDER — NALOXONE HYDROCHLORIDE 1 MG/ML
2 INJECTION INTRAMUSCULAR; INTRAVENOUS; SUBCUTANEOUS
Status: COMPLETED | OUTPATIENT
Start: 2020-01-01 | End: 2020-01-01

## 2020-01-01 RX ORDER — CALCIUM GLUCONATE 94 MG/ML
1 INJECTION, SOLUTION INTRAVENOUS ONCE
Status: DISCONTINUED | OUTPATIENT
Start: 2020-01-01 | End: 2020-01-01

## 2020-01-01 RX ORDER — HEPARIN SODIUM 200 [USP'U]/100ML
400 INJECTION, SOLUTION INTRAVENOUS ONCE
Status: COMPLETED | OUTPATIENT
Start: 2020-01-01 | End: 2020-01-01

## 2020-01-01 RX ORDER — CHLORHEXIDINE GLUCONATE 0.12 MG/ML
15 RINSE ORAL EVERY 12 HOURS
Status: DISCONTINUED | OUTPATIENT
Start: 2020-01-01 | End: 2020-01-01 | Stop reason: HOSPADM

## 2020-01-01 RX ORDER — FENTANYL CITRATE 50 UG/ML
25 INJECTION, SOLUTION INTRAMUSCULAR; INTRAVENOUS
Status: DISCONTINUED | OUTPATIENT
Start: 2020-01-01 | End: 2020-01-01 | Stop reason: HOSPADM

## 2020-01-01 RX ADMIN — SODIUM CHLORIDE 42.8 UNITS/HR: 9 INJECTION, SOLUTION INTRAVENOUS at 06:45

## 2020-01-01 RX ADMIN — SODIUM BICARBONATE 50 MEQ: 84 INJECTION, SOLUTION INTRAVENOUS at 09:06

## 2020-01-01 RX ADMIN — NOREPINEPHRINE BITARTRATE 100 MCG/MIN: 1 INJECTION INTRAVENOUS at 07:51

## 2020-01-01 RX ADMIN — Medication 300 UNITS: at 11:15

## 2020-01-01 RX ADMIN — HEPARIN SODIUM 5000 UNITS: 5000 INJECTION INTRAVENOUS; SUBCUTANEOUS at 17:29

## 2020-01-01 RX ADMIN — SODIUM CHLORIDE 45.1 UNITS/HR: 9 INJECTION, SOLUTION INTRAVENOUS at 01:58

## 2020-01-01 RX ADMIN — SODIUM CHLORIDE, SODIUM LACTATE, POTASSIUM CHLORIDE, AND CALCIUM CHLORIDE 862 ML: 600; 310; 30; 20 INJECTION, SOLUTION INTRAVENOUS at 09:26

## 2020-01-01 RX ADMIN — NALOXONE HYDROCHLORIDE 2 MG: 1 INJECTION PARENTERAL at 09:13

## 2020-01-01 RX ADMIN — SODIUM BICARBONATE 50 MEQ: 84 INJECTION, SOLUTION INTRAVENOUS at 13:12

## 2020-01-01 RX ADMIN — EPINEPHRINE 1 MG: 0.1 INJECTION, SOLUTION ENDOTRACHEAL; INTRACARDIAC; INTRAVENOUS at 13:19

## 2020-01-01 RX ADMIN — PIPERACILLIN AND TAZOBACTAM 3.38 G: 3; .375 INJECTION, POWDER, LYOPHILIZED, FOR SOLUTION INTRAVENOUS at 17:24

## 2020-01-01 RX ADMIN — SODIUM BICARBONATE 100 MEQ: 84 INJECTION, SOLUTION INTRAVENOUS at 02:18

## 2020-01-01 RX ADMIN — SODIUM CHLORIDE 150 ML/HR: 900 INJECTION, SOLUTION INTRAVENOUS at 14:02

## 2020-01-01 RX ADMIN — EPINEPHRINE 1 MG: 0.1 INJECTION, SOLUTION ENDOTRACHEAL; INTRACARDIAC; INTRAVENOUS at 13:08

## 2020-01-01 RX ADMIN — FENTANYL CITRATE 25 MCG: 50 INJECTION, SOLUTION INTRAMUSCULAR; INTRAVENOUS at 00:01

## 2020-01-01 RX ADMIN — DEXTROSE MONOHYDRATE AND SODIUM CHLORIDE 125 ML/HR: 5; .45 INJECTION, SOLUTION INTRAVENOUS at 00:11

## 2020-01-01 RX ADMIN — CALCIUM CHLORIDE, MAGNESIUM CHLORIDE, DEXTROSE MONOHYDRATE, LACTIC ACID, SODIUM CHLORIDE, SODIUM BICARBONATE AND POTASSIUM CHLORIDE 2000 ML/HR: 3.68; 3.05; 22; 5.4; 6.46; 3.09; .314 INJECTION INTRAVENOUS at 22:00

## 2020-01-01 RX ADMIN — Medication 1 AMPULE: at 10:43

## 2020-01-01 RX ADMIN — HUMAN INSULIN 10 UNITS: 100 INJECTION, SOLUTION SUBCUTANEOUS at 10:20

## 2020-01-01 RX ADMIN — Medication 1 AMPULE: at 20:30

## 2020-01-01 RX ADMIN — HEPARIN SODIUM 5000 UNITS: 5000 INJECTION INTRAVENOUS; SUBCUTANEOUS at 15:34

## 2020-01-01 RX ADMIN — SODIUM CHLORIDE 48.4 UNITS/HR: 9 INJECTION, SOLUTION INTRAVENOUS at 04:34

## 2020-01-01 RX ADMIN — POTASSIUM CHLORIDE 10 MEQ: 200 INJECTION, SOLUTION INTRAVENOUS at 22:21

## 2020-01-01 RX ADMIN — SODIUM CHLORIDE 43.2 UNITS/HR: 9 INJECTION, SOLUTION INTRAVENOUS at 19:06

## 2020-01-01 RX ADMIN — PIPERACILLIN AND TAZOBACTAM 3.38 G: 3; .375 INJECTION, POWDER, LYOPHILIZED, FOR SOLUTION INTRAVENOUS at 09:30

## 2020-01-01 RX ADMIN — VANCOMYCIN HYDROCHLORIDE 1000 MG: 1 INJECTION, POWDER, LYOPHILIZED, FOR SOLUTION INTRAVENOUS at 11:41

## 2020-01-01 RX ADMIN — CALCIUM CHLORIDE, MAGNESIUM CHLORIDE, DEXTROSE MONOHYDRATE, LACTIC ACID, SODIUM CHLORIDE, SODIUM BICARBONATE AND POTASSIUM CHLORIDE 2000 ML/HR: 5.15; 2.03; 22; 5.4; 6.46; 3.09; .157 INJECTION INTRAVENOUS at 07:29

## 2020-01-01 RX ADMIN — CHLORHEXIDINE GLUCONATE 15 ML: 0.12 RINSE ORAL at 09:31

## 2020-01-01 RX ADMIN — SODIUM CHLORIDE 24.7 UNITS/HR: 9 INJECTION, SOLUTION INTRAVENOUS at 13:02

## 2020-01-01 RX ADMIN — ACETAMINOPHEN 650 MG: 650 SUPPOSITORY RECTAL at 18:28

## 2020-01-01 RX ADMIN — SODIUM CHLORIDE 43.6 UNITS/HR: 9 INJECTION, SOLUTION INTRAVENOUS at 03:30

## 2020-01-01 RX ADMIN — Medication 10 ML: at 21:01

## 2020-01-01 RX ADMIN — SODIUM BICARBONATE: 84 INJECTION, SOLUTION INTRAVENOUS at 04:00

## 2020-01-01 RX ADMIN — DEXTROSE MONOHYDRATE AND SODIUM CHLORIDE 75 ML/HR: 5; .45 INJECTION, SOLUTION INTRAVENOUS at 06:05

## 2020-01-01 RX ADMIN — ALTEPLASE 0.5 MG: 2.2 INJECTION, POWDER, LYOPHILIZED, FOR SOLUTION INTRAVENOUS at 21:18

## 2020-01-01 RX ADMIN — FAMOTIDINE 20 MG: 10 INJECTION, SOLUTION INTRAVENOUS at 09:30

## 2020-01-01 RX ADMIN — PIPERACILLIN AND TAZOBACTAM 3.38 G: 3; .375 INJECTION, POWDER, LYOPHILIZED, FOR SOLUTION INTRAVENOUS at 04:20

## 2020-01-01 RX ADMIN — CHLORHEXIDINE GLUCONATE 15 ML: 0.12 RINSE ORAL at 20:32

## 2020-01-01 RX ADMIN — SODIUM BICARBONATE 50 MEQ: 84 INJECTION, SOLUTION INTRAVENOUS at 09:05

## 2020-01-01 RX ADMIN — POTASSIUM CHLORIDE 20 MEQ: 29.8 INJECTION, SOLUTION INTRAVENOUS at 23:33

## 2020-01-01 RX ADMIN — HEPARIN SODIUM 5000 UNITS: 5000 INJECTION INTRAVENOUS; SUBCUTANEOUS at 11:41

## 2020-01-01 RX ADMIN — ACETAMINOPHEN 650 MG: 650 SUPPOSITORY RECTAL at 11:45

## 2020-01-01 RX ADMIN — CHLORHEXIDINE GLUCONATE 15 ML: 0.12 RINSE ORAL at 20:50

## 2020-01-01 RX ADMIN — SODIUM CHLORIDE 10.8 UNITS/HR: 9 INJECTION, SOLUTION INTRAVENOUS at 11:48

## 2020-01-01 RX ADMIN — VANCOMYCIN HYDROCHLORIDE 1500 MG: 10 INJECTION, POWDER, LYOPHILIZED, FOR SOLUTION INTRAVENOUS at 00:01

## 2020-01-01 RX ADMIN — SODIUM CHLORIDE 150 ML/HR: 450 INJECTION, SOLUTION INTRAVENOUS at 00:02

## 2020-01-01 RX ADMIN — SODIUM BICARBONATE 50 MEQ: 84 INJECTION, SOLUTION INTRAVENOUS at 04:00

## 2020-01-01 RX ADMIN — NOREPINEPHRINE BITARTRATE 150 MCG/MIN: 1 INJECTION INTRAVENOUS at 09:14

## 2020-01-01 RX ADMIN — CALCIUM CHLORIDE, MAGNESIUM CHLORIDE, DEXTROSE MONOHYDRATE, LACTIC ACID, SODIUM CHLORIDE, SODIUM BICARBONATE AND POTASSIUM CHLORIDE 2000 ML/HR: 3.68; 3.05; 22; 5.4; 6.46; 3.09; .314 INJECTION INTRAVENOUS at 00:22

## 2020-01-01 RX ADMIN — NOREPINEPHRINE BITARTRATE 51 MCG/MIN: 1 INJECTION INTRAVENOUS at 17:26

## 2020-01-01 RX ADMIN — DEXTROSE MONOHYDRATE AND SODIUM CHLORIDE 75 ML/HR: 5; .45 INJECTION, SOLUTION INTRAVENOUS at 17:45

## 2020-01-01 RX ADMIN — CASTOR OIL AND BALSAM, PERU: 788; 87 OINTMENT TOPICAL at 21:25

## 2020-01-01 RX ADMIN — Medication 10 ML: at 17:29

## 2020-01-01 RX ADMIN — Medication 10 ML: at 21:07

## 2020-01-01 RX ADMIN — SODIUM CHLORIDE 150 ML/HR: 450 INJECTION, SOLUTION INTRAVENOUS at 05:02

## 2020-01-01 RX ADMIN — Medication 40 MCG/MIN: at 11:26

## 2020-01-01 RX ADMIN — SODIUM CHLORIDE, SODIUM LACTATE, POTASSIUM CHLORIDE, AND CALCIUM CHLORIDE 1000 ML: 600; 310; 30; 20 INJECTION, SOLUTION INTRAVENOUS at 09:25

## 2020-01-01 RX ADMIN — NOREPINEPHRINE BITARTRATE 140 MCG/MIN: 1 INJECTION INTRAVENOUS at 19:45

## 2020-01-01 RX ADMIN — NOREPINEPHRINE BITARTRATE 160 MCG/MIN: 1 INJECTION INTRAVENOUS at 00:19

## 2020-01-01 RX ADMIN — SODIUM CHLORIDE 66.7 UNITS/HR: 9 INJECTION, SOLUTION INTRAVENOUS at 00:18

## 2020-01-01 RX ADMIN — CASTOR OIL AND BALSAM, PERU: 788; 87 OINTMENT TOPICAL at 09:31

## 2020-01-01 RX ADMIN — CALCIUM CHLORIDE, MAGNESIUM CHLORIDE, DEXTROSE MONOHYDRATE, LACTIC ACID, SODIUM CHLORIDE, SODIUM BICARBONATE AND POTASSIUM CHLORIDE 2000 ML/HR: 3.68; 3.05; 22; 5.4; 6.46; 3.09; .314 INJECTION INTRAVENOUS at 19:03

## 2020-01-01 RX ADMIN — VANCOMYCIN HYDROCHLORIDE 2000 MG: 10 INJECTION, POWDER, LYOPHILIZED, FOR SOLUTION INTRAVENOUS at 11:35

## 2020-01-01 RX ADMIN — CHLORHEXIDINE GLUCONATE 15 ML: 0.12 RINSE ORAL at 10:43

## 2020-01-01 RX ADMIN — EPINEPHRINE 1 MG: 0.1 INJECTION, SOLUTION ENDOTRACHEAL; INTRACARDIAC; INTRAVENOUS at 09:03

## 2020-01-01 RX ADMIN — AMIODARONE HYDROCHLORIDE 150 MG: 50 INJECTION, SOLUTION INTRAVENOUS at 13:14

## 2020-01-01 RX ADMIN — Medication 40 MCG/MIN: at 12:54

## 2020-01-01 RX ADMIN — Medication 10 ML: at 05:11

## 2020-01-01 RX ADMIN — SODIUM CHLORIDE 32.4 UNITS/HR: 9 INJECTION, SOLUTION INTRAVENOUS at 16:19

## 2020-01-01 RX ADMIN — CHLORHEXIDINE GLUCONATE 15 ML: 0.12 RINSE ORAL at 23:34

## 2020-01-01 RX ADMIN — Medication 10 ML: at 15:20

## 2020-01-01 RX ADMIN — EPINEPHRINE 1 MG: 0.1 INJECTION, SOLUTION ENDOTRACHEAL; INTRACARDIAC; INTRAVENOUS at 13:16

## 2020-01-01 RX ADMIN — DEXTROSE MONOHYDRATE AND SODIUM CHLORIDE 75 ML/HR: 5; .45 INJECTION, SOLUTION INTRAVENOUS at 02:10

## 2020-01-01 RX ADMIN — Medication 10 ML: at 21:38

## 2020-01-01 RX ADMIN — DEXTROSE MONOHYDRATE AND SODIUM CHLORIDE 125 ML/HR: 5; .45 INJECTION, SOLUTION INTRAVENOUS at 18:02

## 2020-01-01 RX ADMIN — Medication 10 ML: at 06:00

## 2020-01-01 RX ADMIN — CALCIUM CHLORIDE 1 G: 100 INJECTION, SOLUTION INTRAVENOUS at 13:10

## 2020-01-01 RX ADMIN — SODIUM CHLORIDE, PRESERVATIVE FREE 300 UNITS: 5 INJECTION INTRAVENOUS at 15:38

## 2020-01-01 RX ADMIN — Medication 1 AMPULE: at 20:04

## 2020-01-01 RX ADMIN — Medication 10 ML: at 14:21

## 2020-01-01 RX ADMIN — SODIUM BICARBONATE: 84 INJECTION, SOLUTION INTRAVENOUS at 10:08

## 2020-01-01 RX ADMIN — PIPERACILLIN AND TAZOBACTAM 3.38 G: 3; .375 INJECTION, POWDER, LYOPHILIZED, FOR SOLUTION INTRAVENOUS at 17:27

## 2020-01-01 RX ADMIN — FAMOTIDINE 20 MG: 10 INJECTION, SOLUTION INTRAVENOUS at 11:41

## 2020-01-01 RX ADMIN — SODIUM BICARBONATE 50 MEQ: 84 INJECTION, SOLUTION INTRAVENOUS at 13:16

## 2020-01-01 RX ADMIN — FAMOTIDINE 20 MG: 10 INJECTION, SOLUTION INTRAVENOUS at 08:51

## 2020-01-01 RX ADMIN — POTASSIUM CHLORIDE 10 MEQ: 200 INJECTION, SOLUTION INTRAVENOUS at 01:30

## 2020-01-01 RX ADMIN — SODIUM CHLORIDE 1000 ML: 900 INJECTION, SOLUTION INTRAVENOUS at 16:14

## 2020-01-01 RX ADMIN — NOREPINEPHRINE BITARTRATE 63 MCG/MIN: 1 INJECTION INTRAVENOUS at 14:34

## 2020-01-01 RX ADMIN — SODIUM CHLORIDE, PRESERVATIVE FREE 300 UNITS: 5 INJECTION INTRAVENOUS at 12:05

## 2020-01-01 RX ADMIN — SODIUM CHLORIDE 43.6 UNITS/HR: 9 INJECTION, SOLUTION INTRAVENOUS at 04:10

## 2020-01-01 RX ADMIN — DEXTROSE MONOHYDRATE AND SODIUM CHLORIDE 150 ML/HR: 5; .9 INJECTION, SOLUTION INTRAVENOUS at 10:19

## 2020-01-01 RX ADMIN — Medication 1 AMPULE: at 20:50

## 2020-01-01 RX ADMIN — Medication 1 AMPULE: at 09:31

## 2020-01-01 RX ADMIN — Medication 1 AMPULE: at 08:46

## 2020-01-01 RX ADMIN — CALCIUM CHLORIDE 2 G: 100 INJECTION, SOLUTION INTRAVENOUS at 04:06

## 2020-01-01 RX ADMIN — SODIUM BICARBONATE 50 MEQ: 84 INJECTION, SOLUTION INTRAVENOUS at 10:24

## 2020-01-01 RX ADMIN — CALCIUM CHLORIDE, MAGNESIUM CHLORIDE, DEXTROSE MONOHYDRATE, LACTIC ACID, SODIUM CHLORIDE, SODIUM BICARBONATE AND POTASSIUM CHLORIDE 2000 ML/HR: 3.68; 3.05; 22; 5.4; 6.46; 3.09; .314 INJECTION INTRAVENOUS at 16:35

## 2020-01-01 RX ADMIN — CALCIUM CHLORIDE, MAGNESIUM CHLORIDE, DEXTROSE MONOHYDRATE, LACTIC ACID, SODIUM CHLORIDE, SODIUM BICARBONATE AND POTASSIUM CHLORIDE 2000 ML/HR: 5.15; 2.03; 22; 5.4; 6.46; 3.09; .157 INJECTION INTRAVENOUS at 04:54

## 2020-01-01 RX ADMIN — LIDOCAINE HYDROCHLORIDE 200 MG: 20 INJECTION, SOLUTION INFILTRATION; PERINEURAL at 11:15

## 2020-01-01 RX ADMIN — EPINEPHRINE 1 MG: 0.1 INJECTION, SOLUTION ENDOTRACHEAL; INTRACARDIAC; INTRAVENOUS at 13:12

## 2020-01-01 RX ADMIN — POTASSIUM CHLORIDE 20 MEQ: 29.8 INJECTION, SOLUTION INTRAVENOUS at 00:00

## 2020-01-01 RX ADMIN — CALCIUM GLUCONATE 1 G: 98 INJECTION, SOLUTION INTRAVENOUS at 03:06

## 2020-01-01 RX ADMIN — Medication 20 MCG/MIN: at 10:40

## 2020-01-01 RX ADMIN — HEPARIN SODIUM 200 UNITS: 200 INJECTION, SOLUTION INTRAVENOUS at 11:15

## 2020-01-01 RX ADMIN — PIPERACILLIN AND TAZOBACTAM 3.38 G: 3; .375 INJECTION, POWDER, LYOPHILIZED, FOR SOLUTION INTRAVENOUS at 16:28

## 2020-01-01 RX ADMIN — CALCIUM CHLORIDE 2 G: 100 INJECTION, SOLUTION INTRAVENOUS at 20:30

## 2020-01-01 RX ADMIN — POTASSIUM CHLORIDE 10 MEQ: 200 INJECTION, SOLUTION INTRAVENOUS at 01:11

## 2020-01-01 RX ADMIN — PIPERACILLIN AND TAZOBACTAM 3.38 G: 3; .375 INJECTION, POWDER, LYOPHILIZED, FOR SOLUTION INTRAVENOUS at 05:00

## 2020-01-01 RX ADMIN — SODIUM CHLORIDE 150 ML/HR: 450 INJECTION, SOLUTION INTRAVENOUS at 17:22

## 2020-01-01 RX ADMIN — NOREPINEPHRINE BITARTRATE 180 MCG/MIN: 1 INJECTION INTRAVENOUS at 12:30

## 2020-01-01 RX ADMIN — PIPERACILLIN AND TAZOBACTAM 3.38 G: 3; .375 INJECTION, POWDER, LYOPHILIZED, FOR SOLUTION INTRAVENOUS at 10:57

## 2020-01-01 RX ADMIN — POTASSIUM CHLORIDE 10 MEQ: 200 INJECTION, SOLUTION INTRAVENOUS at 23:15

## 2020-01-01 RX ADMIN — CHLORHEXIDINE GLUCONATE 15 ML: 0.12 RINSE ORAL at 08:44

## 2020-01-01 RX ADMIN — PIPERACILLIN AND TAZOBACTAM 3.38 G: 3; .375 INJECTION, POWDER, LYOPHILIZED, FOR SOLUTION INTRAVENOUS at 00:59

## 2020-01-01 RX ADMIN — SODIUM CHLORIDE 36.9 UNITS/HR: 9 INJECTION, SOLUTION INTRAVENOUS at 09:37

## 2020-01-01 RX ADMIN — DEXTROSE MONOHYDRATE AND SODIUM CHLORIDE 150 ML/HR: 5; .9 INJECTION, SOLUTION INTRAVENOUS at 16:40

## 2020-01-01 RX ADMIN — Medication 10 ML: at 05:15

## 2020-01-01 RX ADMIN — CALCIUM CHLORIDE 1 G: 100 INJECTION, SOLUTION INTRAVENOUS at 09:09

## 2020-01-07 PROBLEM — T68.XXXA HYPOTHERMIA: Status: ACTIVE | Noted: 2020-01-01

## 2020-01-07 NOTE — CONSULTS
NEUROLOGY NOTE     No chief complaint on file. Reason for Consult  I have been asked by Alexx Sommer MD to see the patient in neurological consultation to render advice and opinion regarding alt mental status. HPI  Andra Ríos is a 52 y.o. male who presents to the hospital because of alt mental status. Pt is intubated and cannot provide any hx. Hx obtained by chart review. According to admission notes \"history of diabetes on Lantus who was brought by the EMS today after being found down at his residence. Per notes patient's friend did not hear from him for the past few days until today when  he decided to go and see him at [facility]. pt was  found him on the floor. No evidence of trauma of note patient was admitted to this hospital back in October 2018 with a similar presentation of DKA was a started on insulin and discharged home per records there is no record that he follow-up with any provider and also this is corroborated by his friend that he said that he does not remember that the patient takes any medications. Patient in the emergency room was found to be hypothermic with a low blood pressure w and a sugar of 1400 patient was an unresponsive patient was intubated in the emergency room. started on Insulin drip/bicar drip and started on on pressors\"    ROS  A ten system review of constitutional, cardiovascular, respiratory, musculoskeletal, endocrine, skin, SHEENT, genitourinary, psychiatric and neurologic systems was obtained and is unremarkable except as stated in HPI     PMH  No past medical history on file. FH  No family history on file. SH  Social History     Socioeconomic History    Marital status: SINGLE     Spouse name: Not on file    Number of children: Not on file    Years of education: Not on file    Highest education level: Not on file   Tobacco Use    Smoking status: Never Smoker    Smokeless tobacco: Never Used   Substance and Sexual Activity    Alcohol use:  Yes    Drug use: No       ALLERGIES  No Known Allergies    PHYSICAL EXAMINATION:   Patient Vitals for the past 24 hrs:   Temp Pulse Resp BP SpO2   01/07/20 1420  (!) 113 22 97/53 97 %   01/07/20 1410  (!) 111 23 (!) 80/54 99 %   01/07/20 1407  (!) 111 23 (!) 80/54 99 %   01/07/20 1400 99.1 °F (37.3 °C) (!) 110 24 (!) 87/48    01/07/20 1344 99.1 °F (37.3 °C) (!) 112 24 (!) 87/48 99 %   01/07/20 1300  (!) 106 26 93/59    01/07/20 1245  (!) 106 26 97/48    01/07/20 1230  (!) 105 26 104/51    01/07/20 1215  (!) 105 25 99/52    01/07/20 1200 97.7 °F (36.5 °C) (!) 103 26 94/51 100 %   01/07/20 1145  (!) 102 25 92/48    01/07/20 1130  (!) 102 25 104/49    01/07/20 1115  (!) 102 26 114/53    01/07/20 1105  (!) 104 22 106/56    01/07/20 1100  (!) 103 22 112/56 100 %   01/07/20 1055  (!) 101 23 94/53    01/07/20 1050  100 22 (!) 79/47    01/07/20 1040  99 22 (!) 70/41    01/07/20 1035  99  (!) 63/37         General:   General appearance: Pt is in no acute distress   Distal pulses are preserved    Neurological Examination:   Mental Status:  Intubated. Comatose. On no sedation. Cranial Nerves: Left gaze deviation. Perrl. Corneal is present. Breathing over the vent.      Motor: 0/5 in all 4 ext    Sensation: No response to pain      LAB DATA REVIEWED:    Recent Results (from the past 24 hour(s))   GLUCOSE, POC    Collection Time: 01/07/20  9:08 AM   Result Value Ref Range    Glucose (POC) >600 (HH) 65 - 100 mg/dL    Performed by [address] [place] [address], POC    Collection Time: 01/07/20  9:09 AM   Result Value Ref Range    Glucose (POC) >600 (HH) 65 - 100 mg/dL    Performed by [facility]    Collection Time: 01/07/20  9:27 AM   Result Value Ref Range    Sodium 146 (H) 136 - 145 mmol/L    Potassium 5.1 3.5 - 5.1 mmol/L    Chloride 107 97 - 108 mmol/L    CO2 12 (LL) 21 - 32 mmol/L    Anion gap 27 (H) 5 - 15 mmol/L    Glucose 1,491 (HH) 65 - 100 mg/dL     (H) 6 - 20 MG/DL    Creatinine 9.66 (H) 0.70 - 1.30 MG/DL    BUN/Creatinine ratio 13 12 - 20      GFR est AA 7 (L) >60 ml/min/1.73m2    GFR est non-AA 6 (L) >60 ml/min/1.73m2    Calcium 8.5 8.5 - 10.1 MG/DL    Bilirubin, total 0.6 0.2 - 1.0 MG/DL    ALT (SGPT) 30 12 - 78 U/L    AST (SGOT) 13 (L) 15 - 37 U/L    Alk. phosphatase 139 (H) 45 - 117 U/L    Protein, total 7.4 6.4 - 8.2 g/dL    Albumin 3.0 (L) 3.5 - 5.0 g/dL    Globulin 4.4 (H) 2.0 - 4.0 g/dL    A-G Ratio 0.7 (L) 1.1 - 2.2     CBC WITH AUTOMATED DIFF    Collection Time: 01/07/20  9:27 AM   Result Value Ref Range    WBC 14.4 (H) 4.1 - 11.1 K/uL    RBC 5.44 4.10 - 5.70 M/uL    HGB 14.9 12.1 - 17.0 g/dL    HCT 48.3 36.6 - 50.3 %    MCV 88.8 80.0 - 99.0 FL    MCH 27.4 26.0 - 34.0 PG    MCHC 30.8 30.0 - 36.5 g/dL    RDW 14.9 (H) 11.5 - 14.5 %    PLATELET 364 870 - 074 K/uL    MPV 12.8 8.9 - 12.9 FL    NRBC 0.5 (H) 0  WBC    ABSOLUTE NRBC 0.07 (H) 0.00 - 0.01 K/uL    NEUTROPHILS 88 (H) 32 - 75 %    BAND NEUTROPHILS 3 %    LYMPHOCYTES 7 (L) 12 - 49 %    MONOCYTES 2 (L) 5 - 13 %    EOSINOPHILS 0 0 - 7 %    BASOPHILS 0 0 - 1 %    IMMATURE GRANULOCYTES 0 0.0 - 0.5 %    ABS. NEUTROPHILS 13.1 (H) 1.8 - 8.0 K/UL    ABS. LYMPHOCYTES 1.0 0.8 - 3.5 K/UL    ABS. MONOCYTES 0.3 0.0 - 1.0 K/UL    ABS. EOSINOPHILS 0.0 0.0 - 0.4 K/UL    ABS. BASOPHILS 0.0 0.0 - 0.1 K/UL    ABS. IMM.  GRANS. 0.0 0.00 - 0.04 K/UL    DF MANUAL      RBC COMMENTS NORMOCYTIC, NORMOCHROMIC     POC CHEM8    Collection Time: 01/07/20  9:27 AM   Result Value Ref Range    Calcium, ionized (POC) 1.10 (L) 1.12 - 1.32 mmol/L    Sodium (POC) 148 (H) 136 - 145 mmol/L    Potassium (POC) 5.1 3.5 - 5.1 mmol/L    Chloride (POC) 115 (H) 98 - 107 mmol/L    CO2 (POC) 14 (LL) 21 - 32 mmol/L    Anion gap (POC) 25 (H) 10 - 20 mmol/L    Glucose (POC) >700 (HH) 65 - 100 mg/dL    BUN (POC) 116 (H) 9 - 20 mg/dL    Creatinine (POC) 8.5 (H) 0.6 - 1.3 mg/dL    GFRAA, POC 8 (L) >60 ml/min/1.73m2    GFRNA, POC 7 (L) >60 ml/min/1.73m2 Hematocrit (POC) 47 36.6 - 50.3 %    Comment Comment Not Indicated.      EKG, 12 LEAD, INITIAL    Collection Time: 01/07/20  9:33 AM   Result Value Ref Range    Ventricular Rate 94 BPM    Atrial Rate 94 BPM    P-R Interval 136 ms    QRS Duration 88 ms    Q-T Interval 368 ms    QTC Calculation (Bezet) 460 ms    Calculated P Axis 49 degrees    Calculated R Axis 17 degrees    Calculated T Axis -10 degrees    Diagnosis       Normal sinus rhythm  Normal ECG  No previous ECGs available     POC LACTIC ACID    Collection Time: 01/07/20  9:36 AM   Result Value Ref Range    Lactic Acid (POC) 3.55 (HH) 0.40 - 2.00 mmol/L   URINALYSIS W/ REFLEX CULTURE    Collection Time: 01/07/20 11:25 AM   Result Value Ref Range    Color YELLOW/STRAW      Appearance CLEAR CLEAR      Specific gravity 1.026 1.003 - 1.030      pH (UA) 5.0 5.0 - 8.0      Protein NEGATIVE  NEG mg/dL    Glucose >1,000 (A) NEG mg/dL    Ketone TRACE (A) NEG mg/dL    Blood NEGATIVE  NEG      Urobilinogen 0.2 0.2 - 1.0 EU/dL    Nitrites NEGATIVE  NEG      Leukocyte Esterase NEGATIVE  NEG      WBC 0-4 0 - 4 /hpf    RBC 0-5 0 - 5 /hpf    Epithelial cells FEW FEW /lpf    Bacteria NEGATIVE  NEG /hpf    UA:UC IF INDICATED CULTURE NOT INDICATED BY UA RESULT CNI      Hyaline cast 2-5 0 - 5 /lpf   DRUG SCREEN, URINE    Collection Time: 01/07/20 11:25 AM   Result Value Ref Range    AMPHETAMINES NEGATIVE  NEG      BARBITURATES NEGATIVE  NEG      BENZODIAZEPINES NEGATIVE  NEG      COCAINE NEGATIVE  NEG      METHADONE NEGATIVE  NEG      OPIATES NEGATIVE  NEG      PCP(PHENCYCLIDINE) NEGATIVE  NEG      THC (TH-CANNABINOL) NEGATIVE  NEG      Drug screen comment (NOTE)    BILIRUBIN, CONFIRM    Collection Time: 01/07/20 11:25 AM   Result Value Ref Range    Bilirubin UA, confirm NEGATIVE  NEG     CK    Collection Time: 01/07/20 11:34 AM   Result Value Ref Range     (H) 39 - 308 U/L   TROPONIN I    Collection Time: 01/07/20 11:34 AM   Result Value Ref Range    Troponin-I, Qt. 0.33 (H) <5.40 ng/mL   METABOLIC PANEL, BASIC    Collection Time: 01/07/20 11:34 AM   Result Value Ref Range    Sodium 151 (H) 136 - 145 mmol/L    Potassium 4.5 3.5 - 5.1 mmol/L    Chloride 110 (H) 97 - 108 mmol/L    CO2 14 (LL) 21 - 32 mmol/L    Anion gap 27 (H) 5 - 15 mmol/L    Glucose 1,384 (HH) 65 - 100 mg/dL     (H) 6 - 20 MG/DL    Creatinine 9.97 (H) 0.70 - 1.30 MG/DL    BUN/Creatinine ratio 13 12 - 20      GFR est AA 7 (L) >60 ml/min/1.73m2    GFR est non-AA 6 (L) >60 ml/min/1.73m2    Calcium 8.4 (L) 8.5 - 10.1 MG/DL   ETHYL ALCOHOL    Collection Time: 01/07/20 11:34 AM   Result Value Ref Range    ALCOHOL(ETHYL),SERUM <10 <10 MG/DL   GLUCOSE, POC    Collection Time: 01/07/20 11:43 AM   Result Value Ref Range    Glucose (POC) >600 (HH) 65 - 100 mg/dL    Performed by Josee Rodriguez RN (traveler)    GLUCOSTABILIZER    Collection Time: 01/07/20 11:48 AM   Result Value Ref Range    Glucose 600 mg/dL    Insulin order 10.8 units/hour    Insulin adminstered 10.8 units/hour    Multiplier 0.020     Low target 150 mg/dL    High target 250 mg/dL    D50 order 0.0 ml    D50 administered 0.00 ml    Minutes until next BG 60 min    Order initials md     Administered initials celestine     GLSCOM Comments     GLUCOSE, POC    Collection Time: 01/07/20 12:47 PM   Result Value Ref Range    Glucose (POC) >600 (HH) 65 - 100 mg/dL    Performed by Josee oRdriguez RN (traveler)    GLUCOSTABILIZER    Collection Time: 01/07/20 12:49 PM   Result Value Ref Range    Glucose 600 mg/dL    Insulin order 16.2 units/hour    Insulin adminstered 16.2 units/hour    Multiplier 0.030     Low target 150 mg/dL    High target 250 mg/dL    D50 order 0.0 ml    D50 administered 0.00 ml    Minutes until next BG 60 min    Order initials celestine     Administered initials celestine     GLSCOM Comments     ACETAMINOPHEN    Collection Time: 01/07/20  1:04 PM   Result Value Ref Range    Acetaminophen level 2 (L) 10 - 30 ug/mL   SALICYLATE    Collection Time: 01/07/20 1:04 PM   Result Value Ref Range    Salicylate level 2.3 (L) 2.8 - 10.9 MG/DL   METABOLIC PANEL, BASIC    Collection Time: 01/07/20  1:04 PM   Result Value Ref Range    Sodium 152 (H) 136 - 145 mmol/L    Potassium 4.3 3.5 - 5.1 mmol/L    Chloride 114 (H) 97 - 108 mmol/L    CO2 15 (LL) 21 - 32 mmol/L    Anion gap 23 (H) 5 - 15 mmol/L    Glucose 1,262 (HH) 65 - 100 mg/dL     (H) 6 - 20 MG/DL    Creatinine 9.73 (H) 0.70 - 1.30 MG/DL    BUN/Creatinine ratio 13 12 - 20      GFR est AA 7 (L) >60 ml/min/1.73m2    GFR est non-AA 6 (L) >60 ml/min/1.73m2    Calcium 8.0 (L) 8.5 - 10.1 MG/DL   MAGNESIUM    Collection Time: 01/07/20  1:04 PM   Result Value Ref Range    Magnesium 4.2 (H) 1.6 - 2.4 mg/dL   PTT    Collection Time: 01/07/20  1:04 PM   Result Value Ref Range    aPTT 21.2 (L) 22.1 - 32.0 sec    aPTT, therapeutic range     58.0 - 77.0 SECS   PROTHROMBIN TIME + INR    Collection Time: 01/07/20  1:04 PM   Result Value Ref Range    INR 1.1 0.9 - 1.1      Prothrombin time 11.4 (H) 9.0 - 11.1 sec   SAMPLES BEING HELD    Collection Time: 01/07/20  1:04 PM   Result Value Ref Range    SAMPLES BEING HELD  1 PST     COMMENT        Add-on orders for these samples will be processed based on acceptable specimen integrity and analyte stability, which may vary by analyte.    PHOSPHORUS    Collection Time: 01/07/20  1:04 PM   Result Value Ref Range    Phosphorus 6.1 (H) 2.6 - 4.7 MG/DL   POC G3 - PUL    Collection Time: 01/07/20  1:19 PM   Result Value Ref Range    FIO2 (POC) 50 %    pH (POC) 7.286 (L) 7.35 - 7.45      pCO2 (POC) 25.1 (L) 35.0 - 45.0 MMHG    pO2 (POC) 234 (H) 80 - 100 MMHG    HCO3 (POC) 12.0 (L) 22 - 26 MMOL/L    sO2 (POC) 100 (H) 92 - 97 %    Base deficit (POC) 15 mmol/L    Site RIGHT RADIAL      Device: VENT      Mode ASSIST CONTROL      Tidal volume 500 ml    Set Rate 14 bpm    PEEP/CPAP (POC) 6 cmH2O    Allens test (POC) YES      Specimen type (POC) ARTERIAL     GLUCOSE, POC    Collection Time: 01/07/20 1:50 PM   Result Value Ref Range    Glucose (POC) >600 (HH) 65 - 100 mg/dL    Performed by Nilo Salomon RN    GLUCOSTABILIZER    Collection Time: 01/07/20  1:51 PM   Result Value Ref Range    Glucose 600 mg/dL    Insulin order 21.6 units/hour    Insulin adminstered 21.6 units/hour    Multiplier 0.040     Low target 150 mg/dL    High target 250 mg/dL    D50 order 0.0 ml    D50 administered 0.00 ml    Minutes until next BG 60 min    Order initials jdo     Administered initials jdo     GLSCOM Comments          Imaging review:  None    HOME MEDS  Prior to Admission Medications   Prescriptions Last Dose Informant Patient Reported? Taking?   insulin glargine (LANTUS SOLOSTAR U-100 INSULIN) 100 unit/mL (3 mL) inpn Unknown at Unknown time  No No   Sig: Inject 55 units under the skin nightly.  Please provide needles with Rx.   insulin lispro (HUMALOG KWIKPEN INSULIN) 100 unit/mL kwikpen Unknown at Unknown time  No No   Sig: Inject 15 units under the skin three times daily before meals +  Sliding scale regimen as below, TIDAC (before meals):             140-199=2 u            200-249=3 u  250-299=5 u  300-349=7 u  350 or greaterer = 10u      Facility-Administered Medications: None       CURRENT MEDS  Current Facility-Administered Medications   Medication Dose Route Frequency    insulin regular (NOVOLIN R, HUMULIN R) 100 Units in 0.9% sodium chloride 100 mL infusion  0-50 Units/hr IntraVENous TITRATE    piperacillin-tazobactam (ZOSYN) 3.375 g in 0.9% sodium chloride (MBP/ADV) 100 mL  3.375 g IntraVENous NOW    insulin lispro (HUMALOG) injection   SubCUTAneous TIDAC    piperacillin-tazobactam (ZOSYN) 3.375 g in 0.9% sodium chloride (MBP/ADV) 100 mL  3.375 g IntraVENous Q12H    sodium chloride (NS) flush 5-40 mL  5-40 mL IntraVENous Q8H    0.9% sodium chloride infusion  150 mL/hr IntraVENous CONTINUOUS    [START ON 1/8/2020] famotidine (PF) (PEPCID) 20 mg in 0.9% sodium chloride 10 mL injection  20 mg IntraVENous DAILY  NOREPINephrine (LEVOPHED) 32,000 mcg in dextrose 5% 250 mL (128 mcg/mL) infusion  0.5-200 mcg/min IntraVENous TITRATE    [START ON 1/9/2020] vancomycin (VANCOCIN) 1,000 mg in 0.9% sodium chloride (MBP/ADV) 250 mL  1,000 mg IntraVENous Q48H       IMPRESSION/RECOMMENDATIONS:  Gari Habermann is a 52 y.o. male who was found unresponsive at home. He was last seen normal around 2 days ago. He was brought to the hospital and was found to be in DKA. On exam, he is off sedation and intubated. He does have left gaze deviation. +perrl, corneal and is breathing over the vent. Suspect encephalopathy is secondary to hyperglycemia. Need to r/o seizure and ischemic damage as he does have left gaze deviation. - CT head  - EEG    30 min cct provided. Thank you very much for this consultation.      Kyle Larose MD  Neurologist

## 2020-01-07 NOTE — PROGRESS NOTES
Pharmacy Clarification of Prior to Admission Medication Regimen-Follow Up Needed    The patient was not able to participate in interview regarding clarification of the prior to admission medication regimen. Pharmacy attempted to clarify the prior to admission medication regimen for the patient, but was unsuccessful after multiple attempts. The following resources were used to facilitate this attempt:  [facility] attempted to interview patient, but patient was intubated. Patient's roommate was in room but did not anything about the patient's medications. Patient nor roommate could verify patient's pharmacy. The medication history will need to be re-evaluated at a later time during admission when patient is willing/able to participate or if more information is provided.     Thank you,  Eriberto Rand  Medication History Pharmacy Technician

## 2020-01-07 NOTE — PROGRESS NOTES
Spoke with primary RN Shane Mota regarding Sepsis bundle completion. RN verbalized understanding. Will continue to monitor. Thank you,    VIVI Murrell RN   Sepsis [facility]  Call Back # [phone]

## 2020-01-07 NOTE — CONSULTS
[facility]  Pulmonary, Critical Care, and Sleep Medicine    Name: Nohemy Goetz MRN: [id number]   : 1970 Hospital: [facility]   Date: 2020        Critical Care Initial Patient Consult    IMPRESSION:   · Acute respiratory failure - intubated for airway protection  · Severe mixed DKA/HHS  · Acute (and possibly chronic) renal failure  · Shock - probably hypovolemic, but can not exclude septic  · Hypernatremia - severe/profound, especially when corrected for glucose  · Rhabdomyolysis  · Metabolic encephalopathy       RECOMMENDATIONS:   · Ventilator support - adjust for ABG when available  · IV fluids - would continue with isotonic fluids (which would still by hypotonic for him)  · Pressors   · Nephrology consultation  · Monitor creatinine  · Monitor CK  · Insulin drip per DKA protocol  · Serial neuro exams  · Empiric antibiotics pending culture data - unfortunately antibiotics have already been given without cultures being drawn  · DVT/PUD prophylaxis     Subjective/History: This patient has been seen and evaluated at the request of Dr. Fernando Antoine for respiratory failure. Patient is a 52 y.o. male with DM, presented to the ER today unresponsive. He was intubated on arrival due to failure to protect his airway. He is now unresponsive, intubated, on pressors. He has severe DKA. Last known well several days ago. The patient can provide no history at this time. No past medical history on file. Past Surgical History:   Procedure Laterality Date    HX WISDOM TEETH EXTRACTION        Prior to Admission medications    Medication Sig Start Date End Date Taking? Authorizing Provider   insulin glargine (LANTUS SOLOSTAR U-100 INSULIN) 100 unit/mL (3 mL) inpn Inject 55 units under the skin nightly.  Please provide needles with Rx. 10/12/18   Graceann Apley, MD   insulin lispro (HUMALOG KWIKPEN INSULIN) 100 unit/mL kwikpen Inject 15 units under the skin three times daily before meals +  Sliding scale regimen as below, TIDAC (before meals):             140-199=2 u            200-249=3 u  250-299=5 u  300-349=7 u  350 or greaterer = 10u 10/12/18   Shala Mccauley MD     Current Facility-Administered Medications   Medication Dose Route Frequency    NOREPINephrine (LEVOPHED) 8 mg in 5% dextrose 250mL (32 mcg/mL) infusion  2-100 mcg/min IntraVENous TITRATE    insulin regular (NOVOLIN R, HUMULIN R) 100 Units in 0.9% sodium chloride 100 mL infusion  0-50 Units/hr IntraVENous TITRATE    vancomycin (VANCOCIN) 2,000 mg in 0.9% sodium chloride 500 mL IVPB  2,000 mg IntraVENous NOW    piperacillin-tazobactam (ZOSYN) 3.375 g in 0.9% sodium chloride (MBP/ADV) 100 mL  3.375 g IntraVENous NOW    insulin lispro (HUMALOG) injection   SubCUTAneous TIDAC    piperacillin-tazobactam (ZOSYN) 3.375 g in 0.9% sodium chloride (MBP/ADV) 100 mL  3.375 g IntraVENous Q12H    sodium chloride (NS) flush 5-40 mL  5-40 mL IntraVENous Q8H     No Known Allergies   Social History     Tobacco Use    Smoking status: Never Smoker    Smokeless tobacco: Never Used   Substance Use Topics    Alcohol use: Yes      No family history on file. Review of Systems:  Review of systems not obtained due to patient factors. Objective:   Vital Signs:    Visit Vitals  /53   Pulse (!) 102   Resp 26   SpO2 100%               Temp (24hrs), Av.5 °F (35.8 °C), Min:96.5 °F (35.8 °C), Max:96.5 °F (35.8 °C)       Intake/Output:   Last shift:      No intake/output data recorded. Last 3 shifts: No intake/output data recorded.   No intake or output data in the 24 hours ending 20 1303  Hemodynamics:   PAP:   CO:     Wedge:   CI:     CVP:    SVR:       PVR:       Ventilator Settings:  Mode Rate Tidal Volume Pressure FiO2 PEEP   Assist control   500 ml    100 % 6 cm H20     Peak airway pressure: 17 cm H2O    Minute ventilation: 11.1 l/min      Physical Exam:    General:  Intubated, unresponsive on no sedation Head:  Normocephalic, without obvious abnormality, atraumatic. Eyes:  Conjunctivae/corneas clear. Nose: Nares normal. Septum midline. Mucosa normal.     Throat: Lips, mucosa, and tongue normal.     Neck: Supple, symmetrical, trachea midline    Back:   Symmetric, no curvature. ROM normal.   Lungs:   Clear to auscultation bilaterally. Chest wall:  No tenderness or deformity. Heart:  Regular rate and rhythm    Abdomen:   Soft, non-tender. Bowel sounds normal.    Extremities: Extremities normal, atraumatic, no cyanosis or clubbing, femoral CVL placed in ER   Skin: Skin color, texture, turgor normal. No rashes or lesions   Lymph nodes: Cervical, supraclavicular nodes normal.   Neurologic: Unresponsive at this time       Data:     Recent Results (from the past 24 hour(s))   GLUCOSE, POC    Collection Time: 01/07/20  9:08 AM   Result Value Ref Range    Glucose (POC) >600 (HH) 65 - 100 mg/dL    Performed by [address] [place] [address], POC    Collection Time: 01/07/20  9:09 AM   Result Value Ref Range    Glucose (POC) >600 (HH) 65 - 100 mg/dL    Performed by [facility] COMPREHENSIVE    Collection Time: 01/07/20  9:27 AM   Result Value Ref Range    Sodium 146 (H) 136 - 145 mmol/L    Potassium 5.1 3.5 - 5.1 mmol/L    Chloride 107 97 - 108 mmol/L    CO2 12 (LL) 21 - 32 mmol/L    Anion gap 27 (H) 5 - 15 mmol/L    Glucose 1,491 (HH) 65 - 100 mg/dL     (H) 6 - 20 MG/DL    Creatinine 9.66 (H) 0.70 - 1.30 MG/DL    BUN/Creatinine ratio 13 12 - 20      GFR est AA 7 (L) >60 ml/min/1.73m2    GFR est non-AA 6 (L) >60 ml/min/1.73m2    Calcium 8.5 8.5 - 10.1 MG/DL    Bilirubin, total 0.6 0.2 - 1.0 MG/DL    ALT (SGPT) 30 12 - 78 U/L    AST (SGOT) 13 (L) 15 - 37 U/L    Alk.  phosphatase 139 (H) 45 - 117 U/L    Protein, total 7.4 6.4 - 8.2 g/dL    Albumin 3.0 (L) 3.5 - 5.0 g/dL    Globulin 4.4 (H) 2.0 - 4.0 g/dL    A-G Ratio 0.7 (L) 1.1 - 2.2     CBC WITH AUTOMATED DIFF    Collection Time: 01/07/20  9:27 AM   Result Value Ref Range    WBC 14.4 (H) 4.1 - 11.1 K/uL    RBC 5.44 4.10 - 5.70 M/uL    HGB 14.9 12.1 - 17.0 g/dL    HCT 48.3 36.6 - 50.3 %    MCV 88.8 80.0 - 99.0 FL    MCH 27.4 26.0 - 34.0 PG    MCHC 30.8 30.0 - 36.5 g/dL    RDW 14.9 (H) 11.5 - 14.5 %    PLATELET 064 193 - 851 K/uL    MPV 12.8 8.9 - 12.9 FL    NRBC 0.5 (H) 0  WBC    ABSOLUTE NRBC 0.07 (H) 0.00 - 0.01 K/uL    NEUTROPHILS 88 (H) 32 - 75 %    BAND NEUTROPHILS 3 %    LYMPHOCYTES 7 (L) 12 - 49 %    MONOCYTES 2 (L) 5 - 13 %    EOSINOPHILS 0 0 - 7 %    BASOPHILS 0 0 - 1 %    IMMATURE GRANULOCYTES 0 0.0 - 0.5 %    ABS. NEUTROPHILS 13.1 (H) 1.8 - 8.0 K/UL    ABS. LYMPHOCYTES 1.0 0.8 - 3.5 K/UL    ABS. MONOCYTES 0.3 0.0 - 1.0 K/UL    ABS. EOSINOPHILS 0.0 0.0 - 0.4 K/UL    ABS. BASOPHILS 0.0 0.0 - 0.1 K/UL    ABS. IMM. GRANS. 0.0 0.00 - 0.04 K/UL    DF MANUAL      RBC COMMENTS NORMOCYTIC, NORMOCHROMIC     POC CHEM8    Collection Time: 01/07/20  9:27 AM   Result Value Ref Range    Calcium, ionized (POC) 1.10 (L) 1.12 - 1.32 mmol/L    Sodium (POC) 148 (H) 136 - 145 mmol/L    Potassium (POC) 5.1 3.5 - 5.1 mmol/L    Chloride (POC) 115 (H) 98 - 107 mmol/L    CO2 (POC) 14 (LL) 21 - 32 mmol/L    Anion gap (POC) 25 (H) 10 - 20 mmol/L    Glucose (POC) >700 (HH) 65 - 100 mg/dL    BUN (POC) 116 (H) 9 - 20 mg/dL    Creatinine (POC) 8.5 (H) 0.6 - 1.3 mg/dL    GFRAA, POC 8 (L) >60 ml/min/1.73m2    GFRNA, POC 7 (L) >60 ml/min/1.73m2    Hematocrit (POC) 47 36.6 - 50.3 %    Comment Comment Not Indicated.      EKG, 12 LEAD, INITIAL    Collection Time: 01/07/20  9:33 AM   Result Value Ref Range    Ventricular Rate 94 BPM    Atrial Rate 94 BPM    P-R Interval 136 ms    QRS Duration 88 ms    Q-T Interval 368 ms    QTC Calculation (Bezet) 460 ms    Calculated P Axis 49 degrees    Calculated R Axis 17 degrees    Calculated T Axis -10 degrees    Diagnosis       Normal sinus rhythm  Normal ECG  No previous ECGs available     POC LACTIC ACID    Collection Time: 01/07/20 9:36 AM   Result Value Ref Range    Lactic Acid (POC) 3.55 (HH) 0.40 - 2.00 mmol/L   URINALYSIS W/ REFLEX CULTURE    Collection Time: 01/07/20 11:25 AM   Result Value Ref Range    Color YELLOW/STRAW      Appearance CLEAR CLEAR      Specific gravity 1.026 1.003 - 1.030      pH (UA) 5.0 5.0 - 8.0      Protein NEGATIVE  NEG mg/dL    Glucose >1,000 (A) NEG mg/dL    Ketone TRACE (A) NEG mg/dL    Blood NEGATIVE  NEG      Urobilinogen 0.2 0.2 - 1.0 EU/dL    Nitrites NEGATIVE  NEG      Leukocyte Esterase NEGATIVE  NEG      WBC 0-4 0 - 4 /hpf    RBC 0-5 0 - 5 /hpf    Epithelial cells FEW FEW /lpf    Bacteria NEGATIVE  NEG /hpf    UA:UC IF INDICATED CULTURE NOT INDICATED BY UA RESULT CNI      Hyaline cast 2-5 0 - 5 /lpf   DRUG SCREEN, URINE    Collection Time: 01/07/20 11:25 AM   Result Value Ref Range    AMPHETAMINES NEGATIVE  NEG      BARBITURATES NEGATIVE  NEG      BENZODIAZEPINES NEGATIVE  NEG      COCAINE NEGATIVE  NEG      METHADONE NEGATIVE  NEG      OPIATES NEGATIVE  NEG      PCP(PHENCYCLIDINE) NEGATIVE  NEG      THC (TH-CANNABINOL) NEGATIVE  NEG      Drug screen comment (NOTE)    BILIRUBIN, CONFIRM    Collection Time: 01/07/20 11:25 AM   Result Value Ref Range    Bilirubin UA, confirm NEGATIVE  NEG     CK    Collection Time: 01/07/20 11:34 AM   Result Value Ref Range     (H) 39 - 308 U/L   TROPONIN I    Collection Time: 01/07/20 11:34 AM   Result Value Ref Range    Troponin-I, Qt. 0.33 (H) <5.75 ng/mL   METABOLIC PANEL, BASIC    Collection Time: 01/07/20 11:34 AM   Result Value Ref Range    Sodium 151 (H) 136 - 145 mmol/L    Potassium 4.5 3.5 - 5.1 mmol/L    Chloride 110 (H) 97 - 108 mmol/L    CO2 14 (LL) 21 - 32 mmol/L    Anion gap 27 (H) 5 - 15 mmol/L    Glucose 1,384 (HH) 65 - 100 mg/dL     (H) 6 - 20 MG/DL    Creatinine 9.97 (H) 0.70 - 1.30 MG/DL    BUN/Creatinine ratio 13 12 - 20      GFR est AA 7 (L) >60 ml/min/1.73m2    GFR est non-AA 6 (L) >60 ml/min/1.73m2    Calcium 8.4 (L) 8.5 - 10.1 MG/DL ETHYL ALCOHOL    Collection Time: 01/07/20 11:34 AM   Result Value Ref Range    ALCOHOL(ETHYL),SERUM <10 <10 MG/DL   GLUCOSE, POC    Collection Time: 01/07/20 11:43 AM   Result Value Ref Range    Glucose (POC) >600 (HH) 65 - 100 mg/dL    Performed by Jaqueline Deleon RN (traveler)    GLUCOSTABILIZER    Collection Time: 01/07/20 11:48 AM   Result Value Ref Range    Glucose 600 mg/dL    Insulin order 10.8 units/hour    Insulin adminstered 10.8 units/hour    Multiplier 0.020     Low target 150 mg/dL    High target 250 mg/dL    D50 order 0.0 ml    D50 administered 0.00 ml    Minutes until next BG 60 min    Order initials md     Administered initials celestine     GLSCOM Comments     GLUCOSE, POC    Collection Time: 01/07/20 12:47 PM   Result Value Ref Range    Glucose (POC) >600 (HH) 65 - 100 mg/dL    Performed by Jaqueline Deleon RN (traveler)    GLUCOSTABILIZER    Collection Time: 01/07/20 12:49 PM   Result Value Ref Range    Glucose 600 mg/dL    Insulin order 16.2 units/hour    Insulin adminstered 16.2 units/hour    Multiplier 0.030     Low target 150 mg/dL    High target 250 mg/dL    D50 order 0.0 ml    D50 administered 0.00 ml    Minutes until next BG 60 min    Order initials celestine     Administered initials kb     GLSCOM Comments             Imaging:  I have personally reviewed the patients radiographs and have reviewed the reports:  No acute process        Total critical care time exclusive of procedures: 45 minutes  Andrew Borja MD

## 2020-01-07 NOTE — CONSULTS
Consultation Note    NAME: Kavitha Luz   :  1970   MRN:  [id number]     Date/Time:  2020 3:34 PM    I have been asked to see this patient by Dr. Shelton Wilkinson  for advice/opinion re: PETER. Assessment :    Plan: PETER  CKD  DKA  Hypernatremia  rhabdo - mild  AMS  hypotension   Sodium corrects to 170 on most recent BMP. Would continue with NS for now. Next labs at 4pm.  On an insulin drip. Frequent glucose checks. Rhabdo is mild. I'm hopeful that his renal function will improve as his glucose and acidosis correct. As well, increasing BP will help. He is critically ill. D/W RN, Dr. Manoj Feliz, family and boss. Subjective:   CHIEF COMPLAINT:  UTO    HISTORY OF PRESENT ILLNESS:     George Garrido is a 52 y.o.   male who has a history of DKA admitted with the same. He can give no history. Review of his chart shows that he was here last October with a remarkably similar presentation. His peak creatinine was 6.1 and it declined to 3 by discharge. He was supposed to follow up with Dr. Camryn Resendiz but I'm not sure if he did. Apparently, he was found down at home. In the ER, he was hypotensive and hypothermic and was intubated. His most recent creatinine was 9.73. This is slightly improved from his admitting creatinine. No past medical history on file. Past Surgical History:   Procedure Laterality Date    HX WISDOM TEETH EXTRACTION       Social History     Tobacco Use    Smoking status: Never Smoker    Smokeless tobacco: Never Used   Substance Use Topics    Alcohol use: Yes      No family history on file. No Known Allergies   Prior to Admission medications    Medication Sig Start Date End Date Taking? Authorizing Provider   insulin glargine (LANTUS SOLOSTAR U-100 INSULIN) 100 unit/mL (3 mL) inpn Inject 55 units under the skin nightly.  Please provide needles with Rx. 10/12/18   Bernardino Reddy MD   insulin lispro (HUMALOG KWIKPEN INSULIN) 100 unit/mL kwikpen Inject 15 units under the skin three times daily before meals +  Sliding scale regimen as below, TIDAC (before meals):             140-199=2 u            200-249=3 u  250-299=5 u  300-349=7 u  350 or greaterer = 10u 10/12/18   Shimon Nayak MD     REVIEW OF SYSTEMS:     [x]  Unable to obtain reliable ROS due to  [x] mental status  [] sedated   [] intubated   [] Total of 12 systems reviewed as follows:  Constitutional: negative fever, negative chills, negative weight loss  Eyes:   negative visual changes  ENT:   negative sore throat, tongue or lip swelling  Respiratory:  negative cough, negative dyspnea  Cards:  negative for chest pain, palpitations, lower extremity edema  GI:   negative for nausea, vomiting, diarrhea, and abdominal pain  :  negative for frequency, dysuria  Integument:  negative for rash and pruritus  Heme:  negative for easy bruising and gum/nose bleeding  Musculoskel: negative for myalgias,  back pain and muscle weakness  Neuro:  negative for headaches, dizziness, vertigo  Psych:  negative for feelings of anxiety, depression   Travel?: none    Objective:   VITALS:    Visit Vitals  /54   Pulse (!) 116   Temp 99.1 °F (37.3 °C)   Resp 23   Ht 5' 10.87\" (1.8 m)   SpO2 97%   BMI 29.44 kg/m²     PHYSICAL EXAM:  Gen:  []  WD []  WN  [] cachectic []  thin []  obese []  disheveled             []  ill apearing  []   Critical  []   Chronic    [x]  Intubated    HEENT:   [x] NC/AT/    [x] pink conjunctivae      [] pale conjunctivae                  PERRL  [] yes  [] no      [] moist mucosa    [] dry mucosa    hearing intact to voice [] yes  [] No                 NECK:   supple [x] yes  [] no        masses [] yes  [] No               thyroid  []  non tender  []  tender    RESP:   [] CTA bilaterally/no wheezing/rhonchi/rales/crackles    [x] rhonchi bilaterally - no dullness  [] wheezing   [] rhonchi   [] crackles     use of accessory muscles [] yes [] no    CARD:   [x]  regular rate and rhythm/No murmurs/rubs/gallops    murmur  [] yes ()  [] no      Rubs  [] yes  [] no       Gallops [] yes  [] no    Rate []  regular  []  irregular        carotid bruits  [] Right  []  Left                 LE edema [] yes  [x] no           JVP  []  yes   []  no    ABD:    [x] soft/non distended/non tender/+bowel sounds/no HSM/ +[place]    []  Rigid    tenderness [] yes [] no   Liver enlargement  []  yes []  no                Spleen enlargement  []  yes []  no     distended []  yes [] no     bowel sound  [] hypoactive   [] hyperactive    LYMPH:    Neck []  yes [x]  no       Axillae []  yes [x]  no    SKIN:   Rashes []  yes   [x]  no    Ulcers []  yes   [x]  no               [] tight to palpitation    skin turgor []  good  [] poor  [] decreased               Cyanosis/clubbing []  yes []  no    NEUR:   [] cranial nerves II-XII grossly intact       [] Cranial nerves deficit                 []  facial droop    []  slurred speech   [] aphasic     [] Strength normal     []  weakness  []  LUE  []   RUE/ []  LLE  []   RLE    follows commands  []  yes [x]  no           PSYCH:   insight [x] poor [] good   Alert and Oriented to  [] person  [] place  []  time                    [] depressed [] anxious [] agitated  [] lethargic [] stuporous  [] sedated     LAB DATA REVIEWED:    Recent Labs     01/07/20 0927   WBC 14.4*   HGB 14.9   HCT 48.3        Recent Labs     01/07/20  1304 01/07/20  1134 01/07/20 0927   * 151* 146*   K 4.3 4.5 5.1   * 110* 107   CO2 15* 14* 12*   * 128* 127*   CREA 9.73* 9.97* 9.66*   GLU 1,262* 1,384* 1,491*   CA 8.0* 8.4* 8.5   MG 4.2*  --   --    PHOS 6.1*  --   --      Recent Labs     01/07/20 0927   SGOT 13*   ALT 30   *   TBILI 0.6   ALB 3.0*   GLOB 4.4*     Recent Labs     01/07/20  1304   INR 1.1   PTP 11.4*   APTT 21.2*      No results for input(s): FE, TIBC, PSAT, FERR in the last 72 hours. No results for input(s): PH, PCO2, PO2 in the last 72 hours.   Recent Labs     01/07/20  1134   *     Lab Results Component Value Date/Time    Glucose (POC) >600 (HH) 01/07/2020 02:59 PM    Glucose (POC) >600 (HH) 01/07/2020 01:50 PM    Glucose (POC) >600 (HH) 01/07/2020 12:47 PM    Glucose (POC) >600 (HH) 01/07/2020 11:43 AM    Glucose (POC) >700 (HH) 01/07/2020 09:27 AM    Glucose (POC) >600 () 01/07/2020 09:09 AM       Procedures: see electronic medical records for all procedures/Xrays and details which were not copied into this note but were reviewed prior to creation of Plan.    ________________________________________________________________________       ___________________________________________________  Consulting Physician: Cade Martinez MD

## 2020-01-07 NOTE — ED TRIAGE NOTES
Per EMS roommate called because pt was on floor in room and unresponsive. Last known well was Sunday per EMS. Pt will respond to painful/verbal stimuli. Blood sugar for EMS high. Pt received 500cc bolus from EMS.

## 2020-01-08 NOTE — PROGRESS NOTES
01/08/20 0609   ABCDEF Bundle   SBT Safety Screen Passed No   SBT Screen Reason for Failure Vasopressor use   Weaning Parameters   Spontaneous Breathing Trial Complete No (Comments)

## 2020-01-08 NOTE — PROGRESS NOTES
[address] [facility]   This patient has met the clinical trigger to notify [address] [place] [address]. [address] [place] [address] staff performed an initial evaluation on 01/07/2020  at 2000. [address] [place] [address] staff will continue to follow this case either in person and/or by telephone at this time. Thank you for the referral.   Please contact [address] [place] [address] at [phone] immediately should ANY of the following occur:   - The patient loses additional cranial nerve/brain stem reflexes   - The patient becomes hemodynamically unstable   - The patient suffers cardiac arrest   - Brain death testing is planned or has begun   - The family initiates a donation discussion   (Please do not initiate donation discussions with families)   - A DNR order is written   - The family wishes to withdraw life-sustaining measures   Please call [phone] BEFORE any withdrawal occurs    Thank you for the referral. Please call back above number with any changes in pts POC or status.

## 2020-01-08 NOTE — PROGRESS NOTES
Dr. Lidia Tejeda in to see patient. 0818 lactic acid 3.2. Discussed with Dr. Lidia Tejeda and not checking lactic with next lab draw per MD order. 0900 Dr. Darby Villavicencio in to see patient. Dr. Darby Villavicencio talked with patients girlfriend. 4965 Patient to CT at this time on vent with respiratory therapist and nurse. 4407 patient back to room from CT scan. 1010 Dr. Lidia Tejeda notified of fingerstick blood sugar result of 238. Order placed by MD to add dextrose to IV fluids. 1330 BMP results with Na of discussed with Dr. Darby Villavicencio. No change in orders at this time. 1420 [place] Cassidy Braswell in to see patient. 1538 Heparin 300 units put into white port of right femoral line to dwell.  1600 Heparin removed from white port of central line and line flushes easily, but no blood return noted. Port capped. Temp 101.3 bladder temp. Cooling blanket turned on at this time. 1700 Dr. Mikael Rene in to see patient. 1800 BMP results with NA of 162. Discussed BMP results, NA and urine output of less than 100cc for the shift with Dr. Dominique Maya. Order received to change IV fluids to D5 1/2NS at 125cc/hr and to check BMP at 2200.  1900 Report to [facility] RN.

## 2020-01-08 NOTE — PROGRESS NOTES
Patient to room  from ED. Patient intubated and on vent. Patient unresponsive. Dual skin assessment done with Rocky Tang and reveals no wounds or sores. Alan scale 9. Patient on insulin drip and levophed drip. 1430 Dr. Trejo Sender in to see patient. Discussed CT of head with Dr. Neil Riddleer and patient too unstable to go down at this time. Dr. Trejo Sender aware, no change in orders at this time. [address] in to do EEG  1535 Dr. Treasure Hinojosa in to see patient. 200 Dr. Adarsh Mckeon aware of blood sugar via lab 1171 and lactic 5.2. Order for 1 liter NS bolus. 1640 Echo tech in to do echocardiogram.   1722 Dr. Oscar Hair notified of 3423 chemistry lab results. Order to change IV fluids to 1/2NS at 150cc/hr. 1828 Temp going up. Temp 101.3 via bladder at this time. Tylenol 650mg suppository given at this time. 1856 Continuing to go up on levophed drip to attain MAP of 65.   1910 report to [facility].

## 2020-01-08 NOTE — INTERDISCIPLINARY ROUNDS
Interdisciplinary team rounds were held 1/8/2020 with the following team members:Care Management, Diabetes Treatment Specialist, Nursing, Nutrition, Pharmacy, Physical Therapy, Physician, Respiratory Therapy and Clinical Coordinator. Plan of care discussed. See clinical pathway and/or care plan for interventions and desired outcomes.

## 2020-01-08 NOTE — PROGRESS NOTES
**Consult Information**  Member Facility: [address] [place] [address] Marcio Navi MRN: [id number]  Consult ID: [id number]  Facility Time Zone: ET  Date and Time of Request: 01/08/2020 12:56:16 AM  Requesting Clinician: Roma  Patient Name: Elisabeth Morrissey  YOB: 1970  Gender: Male    **Clinical Note**  Clinical Note: Notified regarding patient's labs (Na of 159 and blood glucose of 638. Patient is on the ventilator and on an insulin drip. Recommend continuing with current care and notifying intensivist as well. **Attestation**  Interaction Mode: Phone Only  Phone Duration (mins): 2  Time of Call : 01/08/2020 01:01 AM  Interaction Attestation: Interprofessional internet consultation was delivered through telephonic and/or electronic communication upon the request of the patients treating physician, while the requesting and the rendering provider were not in the same physical location. Written report was provided to the requesting provider.   Evaluation Duration (mins): 3    **Physician Signature**  This document was electronically signed by: Stefano Khan MD  01/08/2020 01:01 AM

## 2020-01-08 NOTE — PROCEDURES
Patient Name: Edenilson Padilla  : 1970  Age: 52 y.o. Ordering physician: No ref. provider found  Date of EE2020  EEG procedure number: [id number]  Diagnosis: alt mental status  Interpreting physician: Debbi Woodruff MD    ELECTROENCEPHALOGRAM REPORT     PROCEDURE: EEG. CLINICAL INDICATION: The patient is a 52 y.o. male with a history of possible seizures. EEG to rule out seizures, rule out stroke, rule out cortical abnormality. EEG CLASSIFICATION: Abnormal     DESCRIPTION OF THE RECORD:   The background of this recording contains generalized 1 hz activity. No posterior dominant rhythm present. Throughout the recording, there were no clear areas of focal slowing nor spike or spike-and-wave discharges seen. Hyperventilation was not performed. Photic stimulation produced no response. During the recording the patient did not achieve stage II sleep    INTERPRETATION:   Abnormal. Severe diffuse cerebral dysfunction which is non specific for etiology but can be seen in toxic/metabolic states. No seizures. Clinical correlation recommended.       Debbi Woodruff MD  Neurologist  .

## 2020-01-08 NOTE — PROGRESS NOTES
[facility]  Pulmonary, Critical Care, and Sleep Medicine    Name: Giovanny Tobar MRN: [id number]   : 1970 Hospital: [facility]   Date: 2020        Critical Care    IMPRESSION:   · Acute respiratory failure - intubated for airway protection  · Severe mixed DKA/HHS  · Acute (and possibly chronic) oliguric renal failure  · Shock - mixed hypovolemic/septic   · Hypernatremia - severe/profound   · Rhabdomyolysis - severe  · Shock liver  · Metabolic (?anoxic) encephalopathy       RECOMMENDATIONS:   · Ventilator support   · IV fluids per nephrology  · Likely will need RRT  · Pressors   · Monitor CK - rising  · Insulin drip per DKA protocol  · Serial neuro exams  · Empiric antibiotics pending culture data - unfortunately antibiotics given without cultures being drawn  · DVT/PUD prophylaxis  · Critically ill. Prognosis guarded at best.  Discussed with girlfriend at bedside. Subjective/History:     20: remains comatose on no sedation. Critically ill on mechanical ventilation/pressors. Low urine output. Initial history: This patient has been seen and evaluated at the request of Dr. Faisal Reyes for respiratory failure. Patient is a 52 y.o. male with DM, presented to the ER today unresponsive. He was intubated on arrival due to failure to protect his airway. He is now unresponsive, intubated, on pressors. He has severe DKA. Last known well several days ago. The patient can provide no history at this time. No past medical history on file. Past Surgical History:   Procedure Laterality Date    HX WISDOM TEETH EXTRACTION        Prior to Admission medications    Medication Sig Start Date End Date Taking? Authorizing Provider   insulin glargine (LANTUS SOLOSTAR U-100 INSULIN) 100 unit/mL (3 mL) inpn Inject 55 units under the skin nightly.  Please provide needles with Rx. 10/12/18   Charles Amaro MD   insulin lispro (HUMALOG KWIKPEN INSULIN) 100 unit/mL kwikpen Inject 15 units under the skin three times daily before meals +  Sliding scale regimen as below, TIDAC (before meals):             140-199=2 u            200-249=3 u  250-299=5 u  300-349=7 u  350 or greaterer = 10u 10/12/18   Lennie Bean MD     Current Facility-Administered Medications   Medication Dose Route Frequency    influenza vaccine - (6 mos+)(PF) (FLUARIX/FLULAVAL/FLUZONE QUAD) injection 0.5 mL  0.5 mL IntraMUSCular PRIOR TO DISCHARGE    insulin regular (NOVOLIN R, HUMULIN R) 100 Units in 0.9% sodium chloride 100 mL infusion  0-75 Units/hr IntraVENous TITRATE    insulin lispro (HUMALOG) injection   SubCUTAneous TIDAC    piperacillin-tazobactam (ZOSYN) 3.375 g in 0.9% sodium chloride (MBP/ADV) 100 mL  3.375 g IntraVENous Q12H    sodium chloride (NS) flush 5-40 mL  5-40 mL IntraVENous Q8H    famotidine (PF) (PEPCID) 20 mg in 0.9% sodium chloride 10 mL injection  20 mg IntraVENous DAILY    NOREPINephrine (LEVOPHED) 32,000 mcg in dextrose 5% 250 mL (128 mcg/mL) infusion  0.5-200 mcg/min IntraVENous TITRATE    [START ON 2020] vancomycin (VANCOCIN) 1,000 mg in 0.9% sodium chloride (MBP/ADV) 250 mL  1,000 mg IntraVENous Q48H    0.45% sodium chloride infusion  150 mL/hr IntraVENous CONTINUOUS    alcohol 62% (NOZIN) nasal  1 Ampule  1 Ampule Topical Q12H    chlorhexidine (ORAL CARE KIT) 0.12 % mouthwash 15 mL  15 mL Oral Q12H     No Known Allergies   Social History     Tobacco Use    Smoking status: Never Smoker    Smokeless tobacco: Never Used   Substance Use Topics    Alcohol use: Yes      No family history on file. Review of Systems:  Review of systems not obtained due to patient factors.     Objective:   Vital Signs:    Visit Vitals  /81   Pulse (!) 115   Temp 99.9 °F (37.7 °C)   Resp 27   Ht 6' 2\" (1.88 m)   Wt 95.3 kg (210 lb)   SpO2 100%   BMI 26.96 kg/m²       O2 Device: Nasal cannula       Temp (24hrs), Av.3 °F (37.9 °C), Min:96.5 °F (35.8 °C), Max:102.3 °F (39.1 °C)       Intake/Output:   Last shift:      No intake/output data recorded. Last 3 shifts: 01/06 1901 - 01/08 0700  In: 4873.1 [I.V.:4873.1]  Out: 370 [Urine:220]    Intake/Output Summary (Last 24 hours) at 1/8/2020 0740  Last data filed at 1/8/2020 0600  Gross per 24 hour   Intake 4873.12 ml   Output 370 ml   Net 4503.12 ml     Hemodynamics:   PAP:   CO:     Wedge:   CI:     CVP:    SVR:       PVR:       Ventilator Settings:  Mode Rate Tidal Volume Pressure FiO2 PEEP   Assist control   500 ml    50 % 6 cm H20     Peak airway pressure: 25 cm H2O    Minute ventilation: 14.6 l/min      Physical Exam:    General:  Intubated, unresponsive on no sedation    Head:  Normocephalic, without obvious abnormality, atraumatic. Eyes:  Conjunctivae/corneas clear. Nose: Nares normal. Septum midline. Mucosa normal.     Throat: Lips, mucosa, and tongue normal.     Neck: Supple, symmetrical, trachea midline    Back:   Symmetric, no curvature. ROM normal.   Lungs:   Clear to auscultation bilaterally. Chest wall:  No tenderness or deformity. Heart:  Tachycardic, regular   Abdomen:   Soft, non-tender.  Bowel sounds normal.    Extremities: Extremities normal, atraumatic, no cyanosis or clubbing, femoral CVL placed in ER   Skin: Skin color, texture, turgor normal. No rashes or lesions   Neurologic: Unresponsive at this time     Data:     Current Facility-Administered Medications   Medication Dose Route Frequency    influenza vaccine 2019-20 (6 mos+)(PF) (FLUARIX/FLULAVAL/FLUZONE QUAD) injection 0.5 mL  0.5 mL IntraMUSCular PRIOR TO DISCHARGE    insulin regular (NOVOLIN R, HUMULIN R) 100 Units in 0.9% sodium chloride 100 mL infusion  0-75 Units/hr IntraVENous TITRATE    insulin lispro (HUMALOG) injection   SubCUTAneous TIDAC    piperacillin-tazobactam (ZOSYN) 3.375 g in 0.9% sodium chloride (MBP/ADV) 100 mL  3.375 g IntraVENous Q12H    sodium chloride (NS) flush 5-40 mL  5-40 mL IntraVENous Q8H    famotidine (PF) (PEPCID) 20 mg in 0.9% sodium chloride 10 mL injection  20 mg IntraVENous DAILY    NOREPINephrine (LEVOPHED) 32,000 mcg in dextrose 5% 250 mL (128 mcg/mL) infusion  0.5-200 mcg/min IntraVENous TITRATE    [START ON 1/9/2020] vancomycin (VANCOCIN) 1,000 mg in 0.9% sodium chloride (MBP/ADV) 250 mL  1,000 mg IntraVENous Q48H    0.45% sodium chloride infusion  150 mL/hr IntraVENous CONTINUOUS    alcohol 62% (NOZIN) nasal  1 Ampule  1 Ampule Topical Q12H    chlorhexidine (ORAL CARE KIT) 0.12 % mouthwash 15 mL  15 mL Oral Q12H                Labs:  Recent Labs     01/08/20 0323 01/07/20  0927   WBC 16.3* 14.4*   HGB 15.0 14.9   HCT 45.6 48.3   * 189     Recent Labs     01/08/20 0323 01/07/20  2357 01/07/20 2001 01/07/20  1304  01/07/20  0927   * 159* 148*   < > 152*   < > 146*   K 4.0 3.6 3.1*   < > 4.3   < > 5.1   * 126* 117*   < > 114*   < > 107   CO2 21 21 17*   < > 15*   < > 12*   * 638* 722*   < > 1,262*   < > 1,491*   * 132* 107*   < > 123*   < > 127*   CREA 11.10* 10.70* 8.83*   < > 9.73*   < > 9.66*   CA 7.4* 7.8* 6.3*   < > 8.0*   < > 8.5   MG 3.5* 3.5* 3.0*   < > 4.2*  --   --    PHOS 4.7  --   --   --  6.1*  --   --    ALB 2.5*  --   --   --   --   --  3.0*   TBILI 0.7  --   --   --   --   --  0.6   SGOT >2,000*  --   --   --   --   --  13*   ALT 2,766*  --   --   --   --   --  30   INR 1.2*  --   --   --  1.1  --   --     < > = values in this interval not displayed.      Recent Labs     01/08/20  0525 01/07/20  1319   PHI 7.314* 7.286*   PCO2I 32.3* 25.1*   PO2I 138* 234*   HCO3I 16.4* 12.0*   FIO2I 50 50     Imaging:  I have personally reviewed the patients radiographs and have reviewed the reports:  No change        Total critical care time exclusive of procedures: 45 minutes  Doron Villalobos MD

## 2020-01-08 NOTE — DIABETES MGMT
[facility] SPECIALIST CONSULT    Presentation   Alicia Silverio is a 52 y.o. male admitted with DKA and consulted by Provider for advanced diabetes nursing assessment and care, specifically related to Disease Specific Care Management . Current clinical course has been complicated by coma and marked kidney dysfunction. Diabetes-related medical history    Acute complications  DKA    Subjective   Unresponsive. Patient's girlfriend Greer Ribera) states that patient has struggled with the diagnosis of diabetes. She shared the followin. Lives with a roommate and roommate's family  2. Works full-time in [employer] in [place]  3. Has insurance for medical care  4. Has brother who lives in [place] (who is coming in today)  5. Mother and father are   10. Girlfriend sees patient once a month (last time in December)  7. Girlfriend has not seen the patient give insulin or test blood glucoses  8. Girlfriend knows of no precipitating life event that would have prompted discontinuation of insulin  9.  Girlfriend does not know family medical history    Objective   Physical exam    General Unresponsive and on ventilator  Vital Signs   Visit Vitals  /87   Pulse (!) 114   Temp 99.6 °F (37.6 °C)   Resp 25   Ht 6' 2\" (1.88 m)   Wt 95.3 kg (210 lb)   SpO2 98%   BMI 26.96 kg/m²     Laboratory  Lab Results   Component Value Date/Time    Hemoglobin A1c 12.8 (H) 2020 01:04 PM     No results found for: LDL, LDLC, DLDLP  Lab Results   Component Value Date/Time    Creatinine (POC) 8.5 (H) 2020 09:27 AM    Creatinine 11.30 (H) 2020 07:42 AM     Lab Results   Component Value Date/Time    Sodium 160 (H) 2020 07:42 AM    Potassium 3.9 2020 07:42 AM    Chloride 128 (H) 2020 07:42 AM    CO2 21 2020 07:42 AM    Anion gap 11 2020 07:42 AM    Glucose 282 (H) 2020 07:42 AM     (H) 2020 07:42 AM    Creatinine 11.30 (H) 2020 07:42 AM    BUN/Creatinine ratio 11 (L) 01/08/2020 07:42 AM    GFR est AA 6 (L) 01/08/2020 07:42 AM    GFR est non-AA 5 (L) 01/08/2020 07:42 AM    Calcium 7.2 (L) 01/08/2020 07:42 AM    Bilirubin, total 0.7 01/08/2020 03:23 AM    AST (SGOT) >2,000 (H) 01/08/2020 03:23 AM    Alk. phosphatase 142 (H) 01/08/2020 03:23 AM    Protein, total 6.9 01/08/2020 03:23 AM    Albumin 2.5 (L) 01/08/2020 03:23 AM    Globulin 4.4 (H) 01/08/2020 03:23 AM    A-G Ratio 0.6 (L) 01/08/2020 03:23 AM    ALT (SGPT) 2,766 (H) 01/08/2020 03:23 AM     Lab Results   Component Value Date/Time    ALT (SGPT) 2,766 (H) 01/08/2020 03:23 AM     Blood glucose pattern  Patient requiring considerable insulin to bring blood glucose into the 200s. Assessment and Plan   Nursing Diagnosis Risk for unstable blood glucose pattern   Nursing Intervention Domain 1458 Decision-making Support   Nursing Interventions Examined current inpatient diabetes control   Explored factors facilitating and impeding inpatient management  Instructed girlfriend in the differences in types of diabetes and the need for consistent insulin administration in this patient     Evaluation   Based on conversation with girlfriend, this patient has struggled with the diagnosis of diabetes since his first admission in Oct 2018 with DKA. According to her, he is a \"spiritual man\" and may have the belief that God will heal him of this condition. Hence, spiritual care may be of considerable help under these circumstances. If he survives, I would recommend a family meeting to develop a plan for post-discharge care and support. Blood glucose management has been impacted by  [x] Change in kidney function    Recommendations   Continue current diabetes management per DKA protocol as ordered. Once the DKA has resolved, recommend:  1. In light of his weight and kidney function, would recommend basal insulin dosing at 0.3units/kg/day, which equates to 30 units of Lantus or 15 units of NPH insulin twice daily.   2. Augment with corrective insulin using normal sensitivity dosing to determine post-discharge basal insulin dosing. 3. Add mealtime insulin if/when he eats. Recommend referral to  1.  services if/when he awakens to address the impact of his spiritual beliefs on self-care practices.     2. Ambulatory diabetes self-management services if he survives     Billing Code(s)   SEBASTIAN Garcia  Access via (c) [phone]

## 2020-01-08 NOTE — PROGRESS NOTES
Hospitalist Progress Note    NAME: Maria De Jesus Weaver   :  1970   MRN:  [id number]       Assessment / Plan:    DKA/HHS:shock/hypothermic  on pressors now Gluose 1400   -continue insulin gtt per protocol with IVF  -Nutrition consult prior to discharge to start education on carb counting  -RN trained patient on insulin administration   Follow hhs./dka protocol  ekg no acute changes  Management as per ICU team           Change mental status likely secondary to metabolic encephalopathy vrs sepsis/shock  Blood cx pending/lactic acid trended down  Cont vanco/zosyn for now  -continue insulin drip   -neuro consult appreciated   Ct head reviewed  -RRT per nephrology team       Acute renal failure/ckd  secondary to dehydration /hyperglycemia  Continue IV fluid  Follow-up repeat BMP  -S/P RRT  -Nephrology following      Leukocytosis most likely reactive  Follow blood culture  Follow- calcitonin  Continue IV antibiotic empiric therapy      Hypernatremia   -corrected   -Trended down to 164  -management per nephrology  -avoid overcorrection         GI Prophylaxis: on famotidine    25.0 - 29.9 Overweight / Body mass index is 26.96 kg/m². Code status: Full  Prophylaxis: Hep SQ  Recommended Disposition: Home w/Family     Subjective:     Chief Complaint / Reason for Physician Visit  \"Intubated and unresponsive\"On levophed  Discussed with RN events overnight. Review of Systems:  Symptom Y/N Comments  Symptom Y/N Comments   Fever/Chills    Chest Pain     Poor Appetite    Edema     Cough    Abdominal Pain     Sputum    Joint Pain     SOB/HERNANDEZ    Pruritis/Rash     Nausea/vomit    Tolerating PT/OT     Diarrhea    Tolerating Diet     Constipation    Other       Could NOT obtain due to: Pt intubated and unreponsive     Objective:     VITALS:   Last 24hrs VS reviewed since prior progress note.  Most recent are:  Patient Vitals for the past 24 hrs:   Temp Pulse Resp BP SpO2   20 1400  (!) 116 25 126/60 99 % 01/08/20 1300  (!) 116 26 96/49 98 %   01/08/20 1200 99.3 °F (37.4 °C) (!) 114 25 92/65 99 %   01/08/20 1100  (!) 116 23 115/83 99 %   01/08/20 1030  (!) 117 21 127/82 98 %   01/08/20 1000  (!) 110 21 118/74 98 %   01/08/20 0938  (!) 112 23 114/68 98 %   01/08/20 0900  (!) 114 25 132/78 97 %   01/08/20 0830  (!) 114 25 154/87 98 %   01/08/20 0815  (!) 114 23 134/86 99 %   01/08/20 0800 99.6 °F (37.6 °C) (!) 112 24 123/78 100 %   01/08/20 0721  (!) 115 27  100 %   01/08/20 0700  (!) 116 26 109/81 100 %   01/08/20 0600 99.9 °F (37.7 °C) (!) 121 23 146/83 99 %   01/08/20 0530  (!) 121 22 127/88 99 %   01/08/20 0500  (!) 126 23 (!) 133/104 99 %   01/08/20 0452  (!) 127 25 (!) 152/94 99 %   01/08/20 0400 100 °F (37.8 °C) (!) 126 23 (!) 148/91 99 %   01/08/20 0349   24     01/08/20 0320  (!) 129 26 143/88 99 %   01/08/20 0300 100.2 °F (37.9 °C) (!) 137 20 (!) 140/100 100 %   01/08/20 0200  (!) 132 22 (!) 118/91 99 %   01/08/20 0131 (!) 100.6 °F (38.1 °C)       01/08/20 0100  (!) 121 30 110/75 98 %   01/08/20 0000 (!) 101.8 °F (38.8 °C) (!) 119 22 98/62 98 %   01/07/20 2342   22     01/07/20 2310 (!) 101.9 °F (38.8 °C) (!) 125 20 (!) 83/60 98 %   01/07/20 2300  (!) 126 25 94/67 98 %   01/07/20 2220  (!) 125 22 100/59 98 %   01/07/20 2130  (!) 121 25 110/44 98 %   01/07/20 2120  (!) 124 24 (!) 81/58 98 %   01/07/20 2030 (!) 102.3 °F (39.1 °C) (!) 121 23 104/61 99 %   01/07/20 2027   22     01/07/20 2020  (!) 119 24 (!) 80/61 99 %   01/07/20 2000 (!) 102 °F (38.9 °C) (!) 118 24 93/64 97 %   01/07/20 1950  (!) 115 23 98/51 94 %   01/07/20 1948  (!) 116 23 (!) 80/56 94 %   01/07/20 1943  (!) 120 25 (!) 73/50 97 %   01/07/20 1901  (!) 117 24 96/65 96 %   01/07/20 1900  (!) 118 24 (!) 81/53 96 %   01/07/20 1853 (!) 101.5 °F (38.6 °C) (!) 119 25 (!) 74/52 97 %   01/07/20 1830 (!) 101.3 °F (38.5 °C) (!) 120 27 103/52 99 %   01/07/20 1800 (!) 101.1 °F (38.4 °C) (!) 119 23 96/61 99 % 01/07/20 1730  (!) 119 24 101/49 98 %   01/07/20 1715  (!) 119 25 90/55 97 %   01/07/20 1700  (!) 118 24 102/61 96 %   01/07/20 1645    (!) 87/59    01/07/20 1630  (!) 120 15 100/53 96 %   01/07/20 1615  (!) 120 17 95/58 97 %   01/07/20 1600 100.3 °F (37.9 °C) (!) 120 24 99/52 97 %   01/07/20 1542  (!) 117 18 (!) 89/50 98 %   01/07/20 1535  (!) 118 23  98 %   01/07/20 1533  (!) 118 24 (!) 88/48 98 %   01/07/20 1530  (!) 118 24  97 %   01/07/20 1500  (!) 116 23 100/54 97 %       Intake/Output Summary (Last 24 hours) at 1/8/2020 1453  Last data filed at 1/8/2020 1400  Gross per 24 hour   Intake 6441.73 ml   Output 839 ml   Net 5602.73 ml        PHYSICAL EXAM:  General: Intubated and unresponsive  Resp:  CTA bilaterally, no wheezing or rales. No accessory muscle use  CV:  Regular  rhythm,  No edema  GI:  Soft, Non distended, Non tender.  +Bowel sounds  Neurologic:  unresponsive   Skin:  No rashes. No jaundice    Reviewed most current lab test results and cultures  YES  Reviewed most current radiology test results   YES  Review and summation of old records today    NO  Reviewed patient's current orders and MAR    YES  PMH/ reviewed - no change compared to H&P  ________________________________________________________________________  Care Plan discussed with:    Comments   Patient     Family  x    RN x    Care Manager     Consultant                        Multidiciplinary team rounds were held today with , nursing, pharmacist and clinical coordinator. Patient's plan of care was discussed; medications were reviewed and discharge planning was addressed.      ________________________________________________________________________  Total NON critical care TIME:40   Minutes    Total CRITICAL CARE TIME Spent:   Minutes non procedure based      Comments   >50% of visit spent in counseling and coordination of care ________________________________________________________________________  Tammy Mcpherson MD     Procedures: see electronic medical records for all procedures/Xrays and details which were not copied into this note but were reviewed prior to creation of Plan. LABS:  I reviewed today's most current labs and imaging studies. Pertinent labs include:  Recent Labs     01/08/20  0323 01/07/20  0927   WBC 16.3* 14.4*   HGB 15.0 14.9   HCT 45.6 48.3   * 189     Recent Labs     01/08/20  1200 01/08/20  0742 01/08/20  0323  01/07/20  1304  01/07/20  0927   * 160* 161*   < > 152*   < > 146*   K 3.9 3.9 4.0   < > 4.3   < > 5.1   * 128* 127*   < > 114*   < > 107   CO2 20* 21 21   < > 15*   < > 12*   * 282* 463*   < > 1,262*   < > 1,491*   * 123* 129*   < > 123*   < > 127*   CREA 10.70* 11.30* 11.10*   < > 9.73*   < > 9.66*   CA 7.3* 7.2* 7.4*   < > 8.0*   < > 8.5   MG 3.1* 3.3* 3.5*   < > 4.2*  --   --    PHOS  --   --  4.7  --  6.1*  --   --    ALB  --   --  2.5*  --   --   --  3.0*   TBILI  --   --  0.7  --   --   --  0.6   SGOT  --   --  >2,000*  --   --   --  13*   ALT  --   --  2,766*  --   --   --  30   INR  --   --  1.2*  --  1.1  --   --     < > = values in this interval not displayed.        Signed: Tammy Mcpherson MD

## 2020-01-08 NOTE — PROGRESS NOTES
Dione Escobar        :  1970        MRN:  [id number]        Assessment :    Plan:  --PETER  CKD  DKA  Hypernatremia  rhabdo - mild  AMS  hypotension --Creatinine about the same over the past 2 draws. Fair to poor UO. Potassium is OK. Acidosis is better. Pressors are being weaned. I would hold on RRT for today and reassess in AM.  Labs through the day. Corrected sodium on admission was 168 and it's 160 this AM.  Continue with 1/2 NS. Adjust depending on labs. D/W Dr. Salvador Medellin, RN and pt's girlfriend. Critically ill. Subjective:     Chief Complaint:  Intubated. Review of Systems:    Symptom Y/N Comments  Symptom Y/N Comments   Fever/Chills    Chest Pain     Poor Appetite    Edema     Cough    Abdominal Pain     Sputum    Joint Pain     SOB/HERNANDEZ    Pruritis/Rash     Nausea/vomit    Tolerating PT/OT     Diarrhea    Tolerating Diet     Constipation    Other       Could not obtain due to:      Objective:     VITALS:   Last 24hrs VS reviewed since prior progress note.  Most recent are:  Visit Vitals  /68   Pulse (!) 112   Temp 99.6 °F (37.6 °C)   Resp 23   Ht 6' 2\" (1.88 m)   Wt 95.3 kg (210 lb)   SpO2 98%   BMI 26.96 kg/m²       Intake/Output Summary (Last 24 hours) at 2020 0951  Last data filed at 2020 0900  Gross per 24 hour   Intake 5473.12 ml   Output 848 ml   Net 4625.12 ml      Telemetry Reviewed:     PHYSICAL EXAM:  General: NAD  CTA  RRR  abd soft  No edema      Lab Data Reviewed: (see below)    Medications Reviewed: (see below)    PMH/SH reviewed - no change compared to H&P  ________________________________________________________________________  Care Plan discussed with:  Patient     Family      RN     Care Manager                    Consultant:          Comments   >50% of visit spent in counseling and coordination of care ________________________________________________________________________  Shaun Kelley MD     Procedures: see electronic medical records for all procedures/Xrays and details which  were not copied into this note but were reviewed prior to creation of Plan. LABS:  Recent Labs     01/08/20 0323 01/07/20  0927   WBC 16.3* 14.4*   HGB 15.0 14.9   HCT 45.6 48.3   * 189     Recent Labs     01/08/20  0742 01/08/20 0323 01/07/20  2357  01/07/20  1304   * 161* 159*   < > 152*   K 3.9 4.0 3.6   < > 4.3   * 127* 126*   < > 114*   CO2 21 21 21   < > 15*   * 129* 132*   < > 123*   CREA 11.30* 11.10* 10.70*   < > 9.73*   * 463* 638*   < > 1,262*   CA 7.2* 7.4* 7.8*   < > 8.0*   MG 3.3* 3.5* 3.5*   < > 4.2*   PHOS  --  4.7  --   --  6.1*    < > = values in this interval not displayed. Recent Labs     01/08/20 0323 01/07/20  0927   SGOT >2,000* 13*   * 139*   TP 6.9 7.4   ALB 2.5* 3.0*   GLOB 4.4* 4.4*     Recent Labs     01/08/20 0323 01/07/20  1304   INR 1.2* 1.1   PTP 12.5* 11.4*   APTT  --  21.2*      No results for input(s): FE, TIBC, PSAT, FERR in the last 72 hours. Lab Results   Component Value Date/Time    Folate 8.5 10/11/2018 04:39 AM      No results for input(s): PH, PCO2, PO2 in the last 72 hours.   Recent Labs     01/08/20 0323 01/07/20  1134   CPK 16,905* 891*     No components found for: [place]  Lab Results   Component Value Date/Time    Color YELLOW/STRAW 01/07/2020 11:25 AM    Appearance CLEAR 01/07/2020 11:25 AM    Specific gravity 1.026 01/07/2020 11:25 AM    pH (UA) 5.0 01/07/2020 11:25 AM    Protein NEGATIVE  01/07/2020 11:25 AM    Glucose >1,000 (A) 01/07/2020 11:25 AM    Ketone TRACE (A) 01/07/2020 11:25 AM    Bilirubin NEGATIVE  10/06/2018 05:12 AM    Urobilinogen 0.2 01/07/2020 11:25 AM    Nitrites NEGATIVE  01/07/2020 11:25 AM    Leukocyte Esterase NEGATIVE  01/07/2020 11:25 AM    Epithelial cells FEW 01/07/2020 11:25 AM    Bacteria NEGATIVE  01/07/2020 11:25 AM    WBC 0-4 01/07/2020 11:25 AM    RBC 0-5 01/07/2020 11:25 AM       MEDICATIONS:  Current Facility-Administered Medications   Medication Dose Route Frequency    influenza vaccine 2019-20 (6 mos+)(PF) (FLUARIX/FLULAVAL/FLUZONE QUAD) injection 0.5 mL  0.5 mL IntraMUSCular PRIOR TO DISCHARGE    insulin regular (NOVOLIN R, HUMULIN R) 100 Units in 0.9% sodium chloride 100 mL infusion  0-75 Units/hr IntraVENous TITRATE    glucagon (GLUCAGEN) injection 1 mg  1 mg IntraMUSCular PRN    glucose chewable tablet 16 g  4 Tab Oral PRN    dextrose 10% infusion 0-250 mL  0-250 mL IntraVENous PRN    insulin lispro (HUMALOG) injection   SubCUTAneous TIDAC    glucose chewable tablet 16 g  4 Tab Oral PRN    glucagon (GLUCAGEN) injection 1 mg  1 mg IntraMUSCular PRN    piperacillin-tazobactam (ZOSYN) 3.375 g in 0.9% sodium chloride (MBP/ADV) 100 mL  3.375 g IntraVENous Q12H    sodium chloride (NS) flush 5-40 mL  5-40 mL IntraVENous Q8H    sodium chloride (NS) flush 5-40 mL  5-40 mL IntraVENous PRN    ondansetron (ZOFRAN) injection 4 mg  4 mg IntraVENous Q4H PRN    acetaminophen (TYLENOL) suppository 650 mg  650 mg Rectal Q4H PRN    famotidine (PF) (PEPCID) 20 mg in 0.9% sodium chloride 10 mL injection  20 mg IntraVENous DAILY    NOREPINephrine (LEVOPHED) 32,000 mcg in dextrose 5% 250 mL (128 mcg/mL) infusion  0.5-200 mcg/min IntraVENous TITRATE    [START ON 1/9/2020] vancomycin (VANCOCIN) 1,000 mg in 0.9% sodium chloride (MBP/ADV) 250 mL  1,000 mg IntraVENous Q48H    0.45% sodium chloride infusion  150 mL/hr IntraVENous CONTINUOUS    alcohol 62% (NOZIN) nasal  1 Ampule  1 Ampule Topical Q12H    chlorhexidine (ORAL CARE KIT) 0.12 % mouthwash 15 mL  15 mL Oral Q12H

## 2020-01-08 NOTE — CONSULTS
This is an unfortunate 53 yo AAM with a 1-2 year history of Type 2 diabetes largely untreated despite being placed on insulin at diagnosis. Pt's employer tells me today that the pt was not feeling well on Friday and was sent home from work with possible flu/URI. Prior to that pt had been in his usual state of health. Diet poor. .However his girlfriend indicates that he was non-adherent to insulin therapy. A1c 12.8%. There is a 4 day gap between last contact and being found down and transferred to [address] [place] [address] where initial lab tests demonstrated glucose > 1500, anion gap acidosis, ARF, hypotension. Pt was intubated, started on pressors,  Insulin via glucostabilizer (very high insulin infusion rates required) and has otherwise been supported as indicated. Since admission, blood glucose has responded to insulin infusion. Currently receiving 4 units/hr and AG closed. Other labs remarkable for marked elevation of LFTs, CK and serum creatinine >10 suggestive of shock liver and rhabdomyolysis. CT head unremarkable. .    Exam  Unresponsive AAM intubated  BP 91/60  Pulse 120  Extremities with mild edema    IMPRESSION  1. Hyperosmolar hyperglycemic coma currently with glucose 156 on glucostabilizer. Anion gap closed. Unresponsive. CT with no shift or acute process. 2. Rhabdomyolysis/ARF  3. Shock liver    RECOMMENDATIONS  1. Would continue glucostabilizer for now. I will follow closely with you and make additional recommendations as needed,  2. Decisions re additional therapy will depend on neurologic and renal input.

## 2020-01-08 NOTE — PROGRESS NOTES
Bedside shift change report given to Roma TARIQ (oncoming nurse) by [address] [place] [address] (offgoing nurse). Report included the following information SBAR, Kardex, Recent Results and Med Rec Status. Pt is still unresponsive, SBP is being maintained by levo gtt and titrated as needed, HR is elevated in the 120's spoke Dr. Eugene Rodríguez has been made aware of HR and pt most recent labs results, also temp elevating and the need for a cooling blanket has been implemented, electrolyte replacement order implemented, endocrinologist consult placed will make Day shift aware to follow-up, pt is not making any urine although nephrologist and intensivist are aware. Lab has called w/ critical values that of which the hospitalist and intensivist have been made aware. @[id number] called in reference to early morning results critical sodium at 161, bicarb is 16, and cpk is 16,905 and still low urine output Dr. Juan David Mcghee has been made aware.      Report given to [address] [place] [address]

## 2020-01-08 NOTE — PROGRESS NOTES
NEUROLOGY NOTE     No chief complaint on file. SUBJECTIVE:  No neurological changes since yesterday  EEG very slow  CT head normal    HPI  Jennie Fink is a 52 y.o. male who presents to the hospital because of alt mental status. Pt is intubated and cannot provide any hx. Hx obtained by chart review. According to admission notes \"history of diabetes on Lantus who was brought by the EMS today after being found down at his residence. Per notes patient's friend did not hear from him for the past few days until today when  he decided to go and see him at [facility]. pt was  found him on the floor. No evidence of trauma of note patient was admitted to this hospital back in October 2018 with a similar presentation of DKA was a started on insulin and discharged home per records there is no record that he follow-up with any provider and also this is corroborated by his friend that he said that he does not remember that the patient takes any medications. Patient in the emergency room was found to be hypothermic with a low blood pressure w and a sugar of 1400 patient was an unresponsive patient was intubated in the emergency room. started on Insulin drip/bicar drip and started on on pressors\"    ROS  A ten system review of constitutional, cardiovascular, respiratory, musculoskeletal, endocrine, skin, SHEENT, genitourinary, psychiatric and neurologic systems was obtained and is unremarkable except as stated in HPI     PMH  No past medical history on file. FH  No family history on file. SH  Social History     Socioeconomic History    Marital status: SINGLE     Spouse name: Not on file    Number of children: Not on file    Years of education: Not on file    Highest education level: Not on file   Tobacco Use    Smoking status: Never Smoker    Smokeless tobacco: Never Used   Substance and Sexual Activity    Alcohol use:  Yes    Drug use: No       ALLERGIES  No Known Allergies    PHYSICAL EXAMINATION:   Patient Vitals for the past 24 hrs:   Temp Pulse Resp BP SpO2   01/08/20 1600 (!) 101.3 °F (38.5 °C) (!) 120 28 91/60 99 %   01/08/20 1500  (!) 116 27 104/56 99 %   01/08/20 1400  (!) 116 25 126/60 99 %   01/08/20 1300  (!) 116 26 96/49 98 %   01/08/20 1200 99.3 °F (37.4 °C) (!) 114 25 92/65 99 %   01/08/20 1100  (!) 116 23 115/83 99 %   01/08/20 1030  (!) 117 21 127/82 98 %   01/08/20 1000  (!) 110 21 118/74 98 %   01/08/20 0938  (!) 112 23 114/68 98 %   01/08/20 0900  (!) 114 25 132/78 97 %   01/08/20 0830  (!) 114 25 154/87 98 %   01/08/20 0815  (!) 114 23 134/86 99 %   01/08/20 0800 99.6 °F (37.6 °C) (!) 112 24 123/78 100 %   01/08/20 0721  (!) 115 27  100 %   01/08/20 0700  (!) 116 26 109/81 100 %   01/08/20 0600 99.9 °F (37.7 °C) (!) 121 23 146/83 99 %   01/08/20 0530  (!) 121 22 127/88 99 %   01/08/20 0500  (!) 126 23 (!) 133/104 99 %   01/08/20 0452  (!) 127 25 (!) 152/94 99 %   01/08/20 0400 100 °F (37.8 °C) (!) 126 23 (!) 148/91 99 %   01/08/20 0349   24     01/08/20 0320  (!) 129 26 143/88 99 %   01/08/20 0300 100.2 °F (37.9 °C) (!) 137 20 (!) 140/100 100 %   01/08/20 0200  (!) 132 22 (!) 118/91 99 %   01/08/20 0131 (!) 100.6 °F (38.1 °C)       01/08/20 0100  (!) 121 30 110/75 98 %   01/08/20 0000 (!) 101.8 °F (38.8 °C) (!) 119 22 98/62 98 %   01/07/20 2342   22     01/07/20 2310 (!) 101.9 °F (38.8 °C) (!) 125 20 (!) 83/60 98 %   01/07/20 2300  (!) 126 25 94/67 98 %   01/07/20 2220  (!) 125 22 100/59 98 %   01/07/20 2130  (!) 121 25 110/44 98 %   01/07/20 2120  (!) 124 24 (!) 81/58 98 %   01/07/20 2030 (!) 102.3 °F (39.1 °C) (!) 121 23 104/61 99 %   01/07/20 2027   22     01/07/20 2020  (!) 119 24 (!) 80/61 99 %   01/07/20 2000 (!) 102 °F (38.9 °C) (!) 118 24 93/64 97 %   01/07/20 1950  (!) 115 23 98/51 94 %   01/07/20 1948  (!) 116 23 (!) 80/56 94 %   01/07/20 1943  (!) 120 25 (!) 73/50 97 %   01/07/20 1901  (!) 117 24 96/65 96 %   01/07/20 1900  (!) 118 24 (!) 81/53 96 % 01/07/20 1853 (!) 101.5 °F (38.6 °C) (!) 119 25 (!) 74/52 97 %   01/07/20 1830 (!) 101.3 °F (38.5 °C) (!) 120 27 103/52 99 %   01/07/20 1800 (!) 101.1 °F (38.4 °C) (!) 119 23 96/61 99 %   01/07/20 1730  (!) 119 24 101/49 98 %   01/07/20 1715  (!) 119 25 90/55 97 %        General:   General appearance: Pt is in no acute distress   Distal pulses are preserved    Neurological Examination:   Mental Status:  Intubated. Comatose. On no sedation. Cranial Nerves: EOMI normal. Perrl. Corneal is present. Breathing over the vent.      Motor: 0/5 in all 4 ext    Sensation: No response to pain      LAB DATA REVIEWED:    Recent Results (from the past 24 hour(s))   GLUCOSTABILIZER    Collection Time: 01/07/20  5:14 PM   Result Value Ref Range    Glucose 600 mg/dL    Insulin order 37.8 units/hour    Insulin adminstered 37.8 units/hour    Multiplier 0.070     Low target 150 mg/dL    High target 250 mg/dL    D50 order 0.0 ml    D50 administered 0.00 ml    Minutes until next BG 60 min    Order initials sl     Administered initials sl     GLSCOM Comments     ECHO ADULT COMPLETE    Collection Time: 01/07/20  5:14 PM   Result Value Ref Range    LV E' Lateral Velocity 8.77 cm/s    LV E' Septal Velocity 3.90 cm/s    Aortic Valve Systolic Peak Velocity 316.75 cm/s    Aortic Valve Area by Continuity of Peak Velocity 3.1 cm2    AoV PG 12.5 mmHg    LVIDd 4.33 4.2 - 5.9 cm    LVPWd 1.13 (A) 0.6 - 1.0 cm    LVIDs 2.21 cm    IVSd 1.24 (A) 0.6 - 1.0 cm    LVOT d 1.90 cm    LVOT Peak Velocity 193.53 cm/s    LVOT Peak Gradient 15.0 mmHg    MV A Sukh 61.70 cm/s    MV E Sukh 37.72 cm/s    MV E/A 0.61     LA Vol 4C 26.92 18 - 58 mL    LA Area 4C 12.0 cm2    LV Mass .4 88 - 224 g    LV Mass AL Index 96.6 49 - 115 g/m2    E/E' lateral 4.30     E/E' septal 9.67     RVSP 20.2 mmHg    E/E' ratio (averaged) 6.99     Est. RA Pressure 10.0 mmHg    Mitral Regurgitant Peak Velocity 272.97 cm/s    Mitral Valve E Wave Deceleration Time 329.2 ms    Left Atrium Major Axis 2.51 cm    Triscuspid Valve Regurgitation Peak Gradient 10.2 mmHg    TR Max Velocity 159.85 cm/s    PASP 20.2 mmHg    LA Vol Index 12.13 16 - 28 ml/m2    MR Peak Gradient 29.8 mmHg    AMINAH/BSA Pk Sukh 1.4 cm2/m2    Left Ventricular Fractional Shortening by 2D 34.16295625 %    Mitral Valve Deceleration Warren [id number]     AV Velocity Ratio 1.10     Left Ventricular End Diastolic Volume by Teichholz Method 1.63879073633 mL    Left Ventricular End Systolic Volume by Teichholz Method 2.261900370784 mL    Left Ventricular Stroke Volume by Teichholz Method 31.361055757 mL   GLUCOSE, POC    Collection Time: 01/07/20  6:16 PM   Result Value Ref Range    Glucose (POC) >600 (HH) 65 - 100 mg/dL    Performed by Paula Pain    Collection Time: 01/07/20  6:17 PM   Result Value Ref Range    Glucose 600 mg/dL    Insulin order 43.2 units/hour    Insulin adminstered 43.2 units/hour    Multiplier 0.080     Low target 150 mg/dL    High target 250 mg/dL    D50 order 0.0 ml    D50 administered 0.00 ml    Minutes until next BG 60 min    Order initials      Administered initials      GLSCOM Comments     GLUCOSE, RANDOM    Collection Time: 01/07/20  6:22 PM   Result Value Ref Range    Glucose 959 (HH) 65 - 100 mg/dL   GLUCOSE, POC    Collection Time: 01/07/20  7:21 PM   Result Value Ref Range    Glucose (POC) >600 (HH) 65 - 100 mg/dL    Performed by Paula Pain    Collection Time: 01/07/20  7:22 PM   Result Value Ref Range    Glucose 600 mg/dL    Insulin order 48.6 units/hour    Insulin adminstered 48.6 units/hour    Multiplier 0.090     Low target 150 mg/dL    High target 250 mg/dL    D50 order 0.0 ml    D50 administered 0.00 ml    Minutes until next BG 60 min    Order initials sl     Administered initials sl     GLSCOM Comments     METABOLIC PANEL, BASIC    Collection Time: 01/07/20  8:01 PM   Result Value Ref Range    Sodium 148 (H) 136 - 145 mmol/L    Potassium 3.1 (L) 3.5 - 5.1 mmol/L    Chloride 117 (H) 97 - 108 mmol/L    CO2 17 (L) 21 - 32 mmol/L    Anion gap 14 5 - 15 mmol/L    Glucose 722 (HH) 65 - 100 mg/dL     (H) 6 - 20 MG/DL    Creatinine 8.83 (H) 0.70 - 1.30 MG/DL    BUN/Creatinine ratio 12 12 - 20      GFR est AA 8 (L) >60 ml/min/1.73m2    GFR est non-AA 6 (L) >60 ml/min/1.73m2    Calcium 6.3 (LL) 8.5 - 10.1 MG/DL   MAGNESIUM    Collection Time: 01/07/20  8:01 PM   Result Value Ref Range    Magnesium 3.0 (H) 1.6 - 2.4 mg/dL   GLUCOSE, POC    Collection Time: 01/07/20  8:27 PM   Result Value Ref Range    Glucose (POC) >600 (HH) 65 - 100 mg/dL    Performed by [employer]    Collection Time: 01/07/20  8:28 PM   Result Value Ref Range    Glucose 600 mg/dL    Insulin order 54.0 units/hour    Insulin adminstered 54.0 units/hour    Multiplier 0.100     Low target 150 mg/dL    High target 250 mg/dL    D50 order 0.0 ml    D50 administered 0.00 ml    Minutes until next BG 60 min    Order initials AK     Administered initials AK     GLSCOM Comments     GLUCOSE, POC    Collection Time: 01/07/20  9:33 PM   Result Value Ref Range    Glucose (POC) >600 (HH) 65 - 100 mg/dL    Performed by Yareli Cheema RN    GLUCOSTABILIZER    Collection Time: 01/07/20  9:34 PM   Result Value Ref Range    Glucose 600 mg/dL    Insulin order 59.4 units/hour    Insulin adminstered 59.4 units/hour    Multiplier 0.110     Low target 150 mg/dL    High target 250 mg/dL    D50 order 0.0 ml    D50 administered 0.00 ml    Minutes until next BG 60 min    Order initials sst     Administered initials sst     GLSCOM Comments     GLUCOSE, POC    Collection Time: 01/07/20 10:36 PM   Result Value Ref Range    Glucose (POC) 550 (H) 65 - 100 mg/dL    Performed by Katie Palafox    Collection Time: 01/07/20 10:37 PM   Result Value Ref Range    Glucose 550 mg/dL    Insulin order 58.8 units/hour    Insulin adminstered 58.8 units/hour    Multiplier 0.120     Low target 150 mg/dL    High target 250 mg/dL    D50 order 0.0 ml    D50 administered 0.00 ml    Minutes until next BG 60 min    Order initials AK     Administered initials AK     GLSCOM Comments     GLUCOSE, POC    Collection Time: 01/07/20 11:38 PM   Result Value Ref Range    Glucose (POC) 573 (H) 65 - 100 mg/dL    Performed by Chapis Norris    Collection Time: 01/07/20 11:38 PM   Result Value Ref Range    Glucose 573 mg/dL    Insulin order 66.7 units/hour    Insulin adminstered 66.7 units/hour    Multiplier 0.130     Low target 150 mg/dL    High target 250 mg/dL    D50 order 0.0 ml    D50 administered 0.00 ml    Minutes until next BG 60 min    Order initials AK     Administered initials AK     GLSCOM Comments     LACTIC ACID    Collection Time: 01/07/20 11:55 PM   Result Value Ref Range    Lactic acid 5.0 (HH) 0.4 - 2.0 MMOL/L   METABOLIC PANEL, BASIC    Collection Time: 01/07/20 11:57 PM   Result Value Ref Range    Sodium 159 (H) 136 - 145 mmol/L    Potassium 3.6 3.5 - 5.1 mmol/L    Chloride 126 (H) 97 - 108 mmol/L    CO2 21 21 - 32 mmol/L    Anion gap 12 5 - 15 mmol/L    Glucose 638 (HH) 65 - 100 mg/dL     (H) 6 - 20 MG/DL    Creatinine 10.70 (H) 0.70 - 1.30 MG/DL    BUN/Creatinine ratio 12 12 - 20      GFR est AA 6 (L) >60 ml/min/1.73m2    GFR est non-AA 5 (L) >60 ml/min/1.73m2    Calcium 7.8 (L) 8.5 - 10.1 MG/DL   MAGNESIUM    Collection Time: 01/07/20 11:57 PM   Result Value Ref Range    Magnesium 3.5 (H) 1.6 - 2.4 mg/dL   GLUCOSE, POC    Collection Time: 01/08/20 12:24 AM   Result Value Ref Range    Glucose (POC) 563 (H) 65 - 100 mg/dL    Performed by Chapis Norris    Collection Time: 01/08/20 12:36 AM   Result Value Ref Range    Glucose 563 mg/dL    Insulin order 15.1 units/hour    Insulin adminstered 15.1 units/hour    Multiplier 0.030     Low target 150 mg/dL    High target 250 mg/dL    D50 order 0.0 ml    D50 administered 0.00 ml    Minutes until next BG 60 min Order initials AK     Administered initials AK     GLSCOM Comments     GLUCOSE, POC    Collection Time: 01/08/20  1:26 AM   Result Value Ref Range    Glucose (POC) 511 (H) 65 - 100 mg/dL    Performed by Abel Lime    Collection Time: 01/08/20  1:29 AM   Result Value Ref Range    Glucose 511 mg/dL    Insulin order 45.1 units/hour    Insulin adminstered 45.1 units/hour    Multiplier 0.100     Low target 150 mg/dL    High target 250 mg/dL    D50 order 0.0 ml    D50 administered 0.00 ml    Minutes until next BG 60 min    Order initials AK     Administered initials AK     GLSCOM Comments     GLUCOSE, POC    Collection Time: 01/08/20  2:31 AM   Result Value Ref Range    Glucose (POC) 490 (H) 65 - 100 mg/dL    Performed by [employer]    Collection Time: 01/08/20  2:31 AM   Result Value Ref Range    Glucose 490 mg/dL    Insulin order 47.3 units/hour    Insulin adminstered 47.3 units/hour    Multiplier 0.110     Low target 150 mg/dL    High target 250 mg/dL    D50 order 0.0 ml    D50 administered 0.00 ml    Minutes until next BG 60 min    Order initials AK     Administered initials AK     GLSCOM Comments     TSH 3RD GENERATION    Collection Time: 01/08/20  3:23 AM   Result Value Ref Range    TSH 0.45 0.36 - 3.74 uIU/mL   CBC WITH AUTOMATED DIFF    Collection Time: 01/08/20  3:23 AM   Result Value Ref Range    WBC 16.3 (H) 4.1 - 11.1 K/uL    RBC 5.55 4.10 - 5.70 M/uL    HGB 15.0 12.1 - 17.0 g/dL    HCT 45.6 36.6 - 50.3 %    MCV 82.2 80.0 - 99.0 FL    MCH 27.0 26.0 - 34.0 PG    MCHC 32.9 30.0 - 36.5 g/dL    RDW 14.3 11.5 - 14.5 %    PLATELET 012 (L) 888 - 400 K/uL    MPV 12.0 8.9 - 12.9 FL    NRBC 2.0 (H) 0  WBC    ABSOLUTE NRBC 0.33 (H) 0.00 - 0.01 K/uL    NEUTROPHILS 63 32 - 75 %    BAND NEUTROPHILS 28 %    LYMPHOCYTES 5 (L) 12 - 49 %    MONOCYTES 1 (L) 5 - 13 %    EOSINOPHILS 0 0 - 7 %    BASOPHILS 0 0 - 1 %    MYELOCYTES 3 %    IMMATURE GRANULOCYTES 0 0.0 - 0.5 % ABS. NEUTROPHILS 14.8 (H) 1.8 - 8.0 K/UL    ABS. LYMPHOCYTES 0.8 0.8 - 3.5 K/UL    ABS. MONOCYTES 0.2 0.0 - 1.0 K/UL    ABS. EOSINOPHILS 0.0 0.0 - 0.4 K/UL    ABS. BASOPHILS 0.0 0.0 - 0.1 K/UL    ABS. IMM. GRANS. 0.0 0.00 - 0.04 K/UL    DF MANUAL      RBC COMMENTS NORMOCYTIC, NORMOCHROMIC     METABOLIC PANEL, COMPREHENSIVE    Collection Time: 01/08/20  3:23 AM   Result Value Ref Range    Sodium 161 (HH) 136 - 145 mmol/L    Potassium 4.0 3.5 - 5.1 mmol/L    Chloride 127 (H) 97 - 108 mmol/L    CO2 21 21 - 32 mmol/L    Anion gap 13 5 - 15 mmol/L    Glucose 463 (H) 65 - 100 mg/dL     (H) 6 - 20 MG/DL    Creatinine 11.10 (H) 0.70 - 1.30 MG/DL    BUN/Creatinine ratio 12 12 - 20      GFR est AA 6 (L) >60 ml/min/1.73m2    GFR est non-AA 5 (L) >60 ml/min/1.73m2    Calcium 7.4 (L) 8.5 - 10.1 MG/DL    Bilirubin, total 0.7 0.2 - 1.0 MG/DL    ALT (SGPT) 2,766 (H) 12 - 78 U/L    AST (SGOT) >2,000 (H) 15 - 37 U/L    Alk.  phosphatase 142 (H) 45 - 117 U/L    Protein, total 6.9 6.4 - 8.2 g/dL    Albumin 2.5 (L) 3.5 - 5.0 g/dL    Globulin 4.4 (H) 2.0 - 4.0 g/dL    A-G Ratio 0.6 (L) 1.1 - 2.2     MAGNESIUM    Collection Time: 01/08/20  3:23 AM   Result Value Ref Range    Magnesium 3.5 (H) 1.6 - 2.4 mg/dL   PHOSPHORUS    Collection Time: 01/08/20  3:23 AM   Result Value Ref Range    Phosphorus 4.7 2.6 - 4.7 MG/DL   PROTHROMBIN TIME + INR    Collection Time: 01/08/20  3:23 AM   Result Value Ref Range    INR 1.2 (H) 0.9 - 1.1      Prothrombin time 12.5 (H) 9.0 - 11.1 sec   CK    Collection Time: 01/08/20  3:23 AM   Result Value Ref Range    CK 16,905 (HH) 39 - 308 U/L   GLUCOSE, POC    Collection Time: 01/08/20  3:26 AM   Result Value Ref Range    Glucose (POC) >600 (HH) 65 - 100 mg/dL    Performed by Gail Brandon    GLUCOSE, POC    Collection Time: 01/08/20  3:29 AM   Result Value Ref Range    Glucose (POC) 423 (H) 65 - 100 mg/dL    Performed by Daniel Fonseca    Collection Time: 01/08/20  3:29 AM   Result Value Ref Range    Glucose 423 mg/dL    Insulin order 43.6 units/hour    Insulin adminstered 43.6 units/hour    Multiplier 0.120     Low target 150 mg/dL    High target 250 mg/dL    D50 order 0.0 ml    D50 administered 0.00 ml    Minutes until next BG 60 min    Order initials AK     Administered initials AK     GLSCOM Comments     GLUCOSE, POC    Collection Time: 01/08/20  4:33 AM   Result Value Ref Range    Glucose (POC) 432 (H) 65 - 100 mg/dL    Performed by Betzaida Rod  RN    GLUCOSTABILIZER    Collection Time: 01/08/20  4:33 AM   Result Value Ref Range    Glucose 432 mg/dL    Insulin order 48.4 units/hour    Insulin adminstered 48.4 units/hour    Multiplier 0.130     Low target 150 mg/dL    High target 250 mg/dL    D50 order 0.0 ml    D50 administered 0.00 ml    Minutes until next BG 60 min    Order initials sst     Administered initials sst     GLSCOM Comments     GLUCOSE, POC    Collection Time: 01/08/20  5:24 AM   Result Value Ref Range    Glucose (POC) 455 (H) 65 - 100 mg/dL    Performed by Aruna Brandon    POC G3 - PUL    Collection Time: 01/08/20  5:25 AM   Result Value Ref Range    FIO2 (POC) 50 %    pH (POC) 7.314 (L) 7.35 - 7.45      pCO2 (POC) 32.3 (L) 35.0 - 45.0 MMHG    pO2 (POC) 138 (H) 80 - 100 MMHG    HCO3 (POC) 16.4 (L) 22 - 26 MMOL/L    sO2 (POC) 99 (H) 92 - 97 %    Base deficit (POC) 10 mmol/L    Site LEFT RADIAL      Device: VENT      Mode ASSIST CONTROL      Tidal volume 500 ml    Set Rate 14 bpm    PEEP/CPAP (POC) 8 cmH2O    Allens test (POC) YES      Specimen type (POC) ARTERIAL      Total resp.  rate 22     GLUCOSE, POC    Collection Time: 01/08/20  5:38 AM   Result Value Ref Range    Glucose (POC) 400 (H) 65 - 100 mg/dL    Performed by Brenda Garcia    Collection Time: 01/08/20  5:39 AM   Result Value Ref Range    Glucose 400 mg/dL    Insulin order 47.6 units/hour    Insulin adminstered 47.6 units/hour    Multiplier 0.140     Low target 150 mg/dL    High target 250 mg/dL    D50 order 0.0 ml    D50 administered 0.00 ml    Minutes until next BG 60 min    Order initials jlm     Administered initials jlm     GLSCOM Comments     GLUCOSE, POC    Collection Time: 01/08/20  6:45 AM   Result Value Ref Range    Glucose (POC) 345 (H) 65 - 100 mg/dL    Performed by Sebastian Alves    Collection Time: 01/08/20  6:45 AM   Result Value Ref Range    Glucose 345 mg/dL    Insulin order 42.8 units/hour    Insulin adminstered 42.8 units/hour    Multiplier 0.150     Low target 150 mg/dL    High target 250 mg/dL    D50 order 0.0 ml    D50 administered 0.00 ml    Minutes until next BG 60 min    Order initials AK     Administered initials AK     GLSCOM Comments     METABOLIC PANEL, BASIC    Collection Time: 01/08/20  7:42 AM   Result Value Ref Range    Sodium 160 (H) 136 - 145 mmol/L    Potassium 3.9 3.5 - 5.1 mmol/L    Chloride 128 (H) 97 - 108 mmol/L    CO2 21 21 - 32 mmol/L    Anion gap 11 5 - 15 mmol/L    Glucose 282 (H) 65 - 100 mg/dL     (H) 6 - 20 MG/DL    Creatinine 11.30 (H) 0.70 - 1.30 MG/DL    BUN/Creatinine ratio 11 (L) 12 - 20      GFR est AA 6 (L) >60 ml/min/1.73m2    GFR est non-AA 5 (L) >60 ml/min/1.73m2    Calcium 7.2 (L) 8.5 - 10.1 MG/DL   MAGNESIUM    Collection Time: 01/08/20  7:42 AM   Result Value Ref Range    Magnesium 3.3 (H) 1.6 - 2.4 mg/dL   LACTIC ACID    Collection Time: 01/08/20  7:42 AM   Result Value Ref Range    Lactic acid 3.2 (HH) 0.4 - 2.0 MMOL/L   GLUCOSE, POC    Collection Time: 01/08/20  7:48 AM   Result Value Ref Range    Glucose (POC) 338 (H) 65 - 100 mg/dL    Performed by Hedy Daily    Collection Time: 01/08/20  7:49 AM   Result Value Ref Range    Glucose 338 mg/dL    Insulin order 44.5 units/hour    Insulin adminstered 44.5 units/hour    Multiplier 0.160     Low target 150 mg/dL    High target 250 mg/dL    D50 order 0.0 ml    D50 administered 0.00 ml    Minutes until next BG 60 min    Order initials sl Administered initials      GLSCOM Comments     GLUCOSE, POC    Collection Time: 01/08/20  8:54 AM   Result Value Ref Range    Glucose (POC) 277 (H) 65 - 100 mg/dL    Performed by Paula Stern    Collection Time: 01/08/20  8:54 AM   Result Value Ref Range    Glucose 277 mg/dL    Insulin order 36.9 units/hour    Insulin adminstered 36.9 units/hour    Multiplier 0.170     Low target 150 mg/dL    High target 250 mg/dL    D50 order 0.0 ml    D50 administered 0.00 ml    Minutes until next BG 60 min    Order initials      Administered initials      GLSCOM Comments     GLUCOSE, POC    Collection Time: 01/08/20  9:57 AM   Result Value Ref Range    Glucose (POC) 238 (H) 65 - 100 mg/dL    Performed by Dell Mejia    Collection Time: 01/08/20  9:59 AM   Result Value Ref Range    Glucose 238 mg/dL    Insulin order 30.3 units/hour    Insulin adminstered 30.3 units/hour    Multiplier 0.170     Low target 150 mg/dL    High target 250 mg/dL    D50 order 0.0 ml    D50 administered 0.00 ml    Minutes until next BG 60 min    Order initials ekr     Administered initials ekr     GLSCOM Comments     GLUCOSE, POC    Collection Time: 01/08/20 10:59 AM   Result Value Ref Range    Glucose (POC) 173 (H) 65 - 100 mg/dL    Performed by Paula Stern    Collection Time: 01/08/20 11:00 AM   Result Value Ref Range    Glucose 173 mg/dL    Insulin order 19.2 units/hour    Insulin adminstered 19.2 units/hour    Multiplier 0.170     Low target 150 mg/dL    High target 250 mg/dL    D50 order 0.0 ml    D50 administered 0.00 ml    Minutes until next BG 60 min    Order initials      Administered initials      GLSCOM Comments     MAGNESIUM    Collection Time: 01/08/20 12:00 PM   Result Value Ref Range    Magnesium 3.1 (H) 1.6 - 2.4 mg/dL   METABOLIC PANEL, BASIC    Collection Time: 01/08/20 12:00 PM   Result Value Ref Range    Sodium 164 (HH) 136 - 145 mmol/L    Potassium 3.9 3.5 - 5.1 mmol/L    Chloride 130 (H) 97 - 108 mmol/L    CO2 20 (L) 21 - 32 mmol/L    Anion gap 14 5 - 15 mmol/L    Glucose 156 (H) 65 - 100 mg/dL     (H) 6 - 20 MG/DL    Creatinine 10.70 (H) 0.70 - 1.30 MG/DL    BUN/Creatinine ratio 13 12 - 20      GFR est AA 6 (L) >60 ml/min/1.73m2    GFR est non-AA 5 (L) >60 ml/min/1.73m2    Calcium 7.3 (L) 8.5 - 10.1 MG/DL   GLUCOSE, POC    Collection Time: 01/08/20 12:04 PM   Result Value Ref Range    Glucose (POC) 205 (H) 65 - 100 mg/dL    Performed by Lucina Oneill    Collection Time: 01/08/20 12:04 PM   Result Value Ref Range    Glucose 205 mg/dL    Insulin order 24.7 units/hour    Insulin adminstered 24.7 units/hour    Multiplier 0.170     Low target 150 mg/dL    High target 250 mg/dL    D50 order 0.0 ml    D50 administered 0.00 ml    Minutes until next BG 60 min    Order initials sl     Administered initials sl     GLSCOM Comments     GLUCOSE, POC    Collection Time: 01/08/20  1:11 PM   Result Value Ref Range    Glucose (POC) 117 (H) 65 - 100 mg/dL    Performed by Gerhardt Folks    Collection Time: 01/08/20  1:12 PM   Result Value Ref Range    Glucose 117 mg/dL    Insulin order 7.8 units/hour    Insulin adminstered 7.8 units/hour    Multiplier 0.136     Low target 150 mg/dL    High target 250 mg/dL    D50 order 0.0 ml    D50 administered 0.00 ml    Minutes until next BG 60 min    Order initials sl     Administered initials sl     GLSCOM Comments     GLUCOSE, POC    Collection Time: 01/08/20  2:18 PM   Result Value Ref Range    Glucose (POC) 112 (H) 65 - 100 mg/dL    Performed by Gerhardt Folks    Collection Time: 01/08/20  2:19 PM   Result Value Ref Range    Glucose 112 mg/dL    Insulin order 5.7 units/hour    Insulin adminstered 5.7 units/hour    Multiplier 0.109     Low target 150 mg/dL    High target 250 mg/dL    D50 order 0.0 ml    D50 administered 0.00 ml    Minutes until next BG 60 min    Order initials sl Administered initials sl     GLSCOM Comments     GLUCOSE, POC    Collection Time: 01/08/20  3:25 PM   Result Value Ref Range    Glucose (POC) 110 (H) 65 - 100 mg/dL    Performed by Claus Acosta    Collection Time: 01/08/20  3:25 PM   Result Value Ref Range    Glucose 110 mg/dL    Insulin order 4.4 units/hour    Insulin adminstered 4.4 units/hour    Multiplier 0.087     Low target 150 mg/dL    High target 250 mg/dL    D50 order 0.0 ml    D50 administered 0.00 ml    Minutes until next BG 60 min    Order initials sl     Administered initials      [facility] Comments     METABOLIC PANEL, BASIC    Collection Time: 01/08/20  4:04 PM   Result Value Ref Range    Sodium 162 (HH) 136 - 145 mmol/L    Potassium 4.2 3.5 - 5.1 mmol/L    Chloride 130 (H) 97 - 108 mmol/L    CO2 20 (L) 21 - 32 mmol/L    Anion gap 12 5 - 15 mmol/L    Glucose 130 (H) 65 - 100 mg/dL     (H) 6 - 20 MG/DL    Creatinine 11.40 (H) 0.70 - 1.30 MG/DL    BUN/Creatinine ratio 11 (L) 12 - 20      GFR est AA 6 (L) >60 ml/min/1.73m2    GFR est non-AA 5 (L) >60 ml/min/1.73m2    Calcium 6.8 (L) 8.5 - 10.1 MG/DL   MAGNESIUM    Collection Time: 01/08/20  4:04 PM   Result Value Ref Range    Magnesium 2.9 (H) 1.6 - 2.4 mg/dL   GLUCOSE, POC    Collection Time: 01/08/20  4:28 PM   Result Value Ref Range    Glucose (POC) 123 (H) 65 - 100 mg/dL    Performed by Claus Acosta    Collection Time: 01/08/20  4:29 PM   Result Value Ref Range    Glucose 123 mg/dL    Insulin order 4.4 units/hour    Insulin adminstered 4.4 units/hour    Multiplier 0.070     Low target 150 mg/dL    High target 250 mg/dL    D50 order 0.0 ml    D50 administered 0.00 ml    Minutes until next BG 60 min    Order initials sl     Administered initials sl     GLSCOM Comments          Imaging review:  CT Head  Normal    EEG  Severe encephalopathy    HOME MEDS  Prior to Admission Medications   Prescriptions Last Dose Informant Patient Reported?  Taking?   insulin glargine (LANTUS SOLOSTAR U-100 INSULIN) 100 unit/mL (3 mL) inpn Unknown at Unknown time  No No   Sig: Inject 55 units under the skin nightly. Please provide needles with Rx.   insulin lispro (HUMALOG KWIKPEN INSULIN) 100 unit/mL kwikpen Unknown at Unknown time  No No   Sig: Inject 15 units under the skin three times daily before meals +  Sliding scale regimen as below, TIDAC (before meals):             140-199=2 u            200-249=3 u  250-299=5 u  300-349=7 u  350 or greaterer = 10u      Facility-Administered Medications: None       CURRENT MEDS  Current Facility-Administered Medications   Medication Dose Route Frequency    influenza vaccine 2019-20 (6 mos+)(PF) (FLUARIX/FLULAVAL/FLUZONE QUAD) injection 0.5 mL  0.5 mL IntraMUSCular PRIOR TO DISCHARGE    dextrose 5% and 0.9% NaCl infusion  150 mL/hr IntraVENous CONTINUOUS    heparin (porcine) injection 5,000 Units  5,000 Units SubCUTAneous BID    insulin regular (NOVOLIN R, HUMULIN R) 100 Units in 0.9% sodium chloride 100 mL infusion  0-75 Units/hr IntraVENous TITRATE    insulin lispro (HUMALOG) injection   SubCUTAneous TIDAC    piperacillin-tazobactam (ZOSYN) 3.375 g in 0.9% sodium chloride (MBP/ADV) 100 mL  3.375 g IntraVENous Q12H    sodium chloride (NS) flush 5-40 mL  5-40 mL IntraVENous Q8H    famotidine (PF) (PEPCID) 20 mg in 0.9% sodium chloride 10 mL injection  20 mg IntraVENous DAILY    NOREPINephrine (LEVOPHED) 32,000 mcg in dextrose 5% 250 mL (128 mcg/mL) infusion  0.5-200 mcg/min IntraVENous TITRATE    [START ON 1/9/2020] vancomycin (VANCOCIN) 1,000 mg in 0.9% sodium chloride (MBP/ADV) 250 mL  1,000 mg IntraVENous Q48H    alcohol 62% (NOZIN) nasal  1 Ampule  1 Ampule Topical Q12H    chlorhexidine (ORAL CARE KIT) 0.12 % mouthwash 15 mL  15 mL Oral Q12H       IMPRESSION/RECOMMENDATIONS:  Guillermo Barreto is a 52 y.o. male who was found unresponsive at home. He was last seen normal around 2 days ago.  He was brought to the hospital and was found to be in DKA. On exam, he is off sedation and intubated. Comatose. +perrl, corneal and is breathing over the vent. Suspect encephalopathy is metabolic. If no improvement after correction, will get MRI brain to check for ischemic/anoxic damage. - Ammonia level  - EEG slowed severe slowing. No seizures  - if no improvement after correction of metabolic abnormality, will proceed with MRI brain to check for ischemic damage. April Campos MD  Neurologist     30 min cct provided.

## 2020-01-09 NOTE — PROGRESS NOTES
1900-Received report from [address]  Pt is stable on levo gtt, temp is fluctuating b/w 99.8 and 100.8. cooling blanket as needed. Pt is having resp in the 30's RT came to assess also lab called critical on Ca and CO2 all which has been text paged to the MD on call. Spoke with Dr. Jewel Garcia and Dr. Garrick Prince CXR and ABG ordered, and Intensivist Dr. Burak Clarke has been paged and spoken with orders received and implemented. Also Dr. Casandra Gonzalez has been made aware of pt status orders received and implemented.     Report given to Blaise Salinas RN

## 2020-01-09 NOTE — PROGRESS NOTES
Labs delayed d/t unable to obtain blood from pt. Femoral CVL not returning blood, flushed with heparin with not result. PIVs without blood return as well. Poor vasculature for perpherial stick. Finally able to obtain adaquete sample using ultrasound.

## 2020-01-09 NOTE — PROGRESS NOTES
[facility]  Pulmonary, Critical Care, and Sleep Medicine    Name: Kavitha Luz MRN: [id number]   : 1970 Hospital: [facility]   Date: 2020        Critical Care    IMPRESSION:   · Acute respiratory failure - intubated for airway protection  · Severe mixed DKA/HHS  · Acute (and possibly chronic) anuric renal failure  · Shock - mixed hypovolemic/septic   · Hypernatremia - severe/profound   · Rhabdomyolysis - severe  · Shock liver  · Metabolic (?anoxic) encephalopathy       RECOMMENDATIONS:   · Ventilator support   · IV fluids per nephrology  · Likely will need RRT   · Pressors   · Monitor CK - rising  · LFTs continue to rise  · Insulin drip per DKA protocol  · Serial neuro exams  · Empiric antibiotics pending culture data   · DVT/PUD prophylaxis  · Critically ill. Prognosis guarded at best.  Discussed with brother at bedside. Subjective/History:     20: critically ill on mechanical ventilation/pressors. Still unresponsive on no sedation. 20: remains comatose on no sedation. Critically ill on mechanical ventilation/pressors. Low urine output. Initial history: This patient has been seen and evaluated at the request of Dr. Shelton Wilkinson for respiratory failure. Patient is a 52 y.o. male with DM, presented to the ER today unresponsive. He was intubated on arrival due to failure to protect his airway. He is now unresponsive, intubated, on pressors. He has severe DKA. Last known well several days ago. The patient can provide no history at this time. No past medical history on file. Past Surgical History:   Procedure Laterality Date    HX WISDOM TEETH EXTRACTION        Prior to Admission medications    Medication Sig Start Date End Date Taking? Authorizing Provider   insulin glargine (LANTUS SOLOSTAR U-100 INSULIN) 100 unit/mL (3 mL) inpn Inject 55 units under the skin nightly.  Please provide needles with Rx. 10/12/18   Bernardino Reddy MD insulin lispro (HUMALOG KWIKPEN INSULIN) 100 unit/mL kwikpen Inject 15 units under the skin three times daily before meals +  Sliding scale regimen as below, TIDAC (before meals):             140-199=2 u            200-249=3 u  250-299=5 u  300-349=7 u  350 or greaterer = 10u 10/12/18   Karsten Lentz MD     Current Facility-Administered Medications   Medication Dose Route Frequency    influenza vaccine 2019-20 (6 mos+)(PF) (FLUARIX/FLULAVAL/FLUZONE QUAD) injection 0.5 mL  0.5 mL IntraMUSCular PRIOR TO DISCHARGE    heparin (porcine) injection 5,000 Units  5,000 Units SubCUTAneous BID    dextrose 5 % - 0.45% NaCl infusion  75 mL/hr IntraVENous CONTINUOUS    insulin regular (NOVOLIN R, HUMULIN R) 100 Units in 0.9% sodium chloride 100 mL infusion  0-75 Units/hr IntraVENous TITRATE    insulin lispro (HUMALOG) injection   SubCUTAneous TIDAC    piperacillin-tazobactam (ZOSYN) 3.375 g in 0.9% sodium chloride (MBP/ADV) 100 mL  3.375 g IntraVENous Q12H    sodium chloride (NS) flush 5-40 mL  5-40 mL IntraVENous Q8H    famotidine (PF) (PEPCID) 20 mg in 0.9% sodium chloride 10 mL injection  20 mg IntraVENous DAILY    NOREPINephrine (LEVOPHED) 32,000 mcg in dextrose 5% 250 mL (128 mcg/mL) infusion  0.5-200 mcg/min IntraVENous TITRATE    vancomycin (VANCOCIN) 1,000 mg in 0.9% sodium chloride (MBP/ADV) 250 mL  1,000 mg IntraVENous Q48H    alcohol 62% (NOZIN) nasal  1 Ampule  1 Ampule Topical Q12H    chlorhexidine (ORAL CARE KIT) 0.12 % mouthwash 15 mL  15 mL Oral Q12H     No Known Allergies   Social History     Tobacco Use    Smoking status: Never Smoker    Smokeless tobacco: Never Used   Substance Use Topics    Alcohol use: Yes      No family history on file. Review of Systems:  Review of systems not obtained due to patient factors.     Objective:   Vital Signs:    Visit Vitals  /79   Pulse (!) 114   Temp (!) 100.7 °F (38.2 °C)   Resp 27   Ht 6' 2\" (1.88 m)   Wt 95.3 kg (210 lb)   SpO2 98% BMI 26.96 kg/m²       O2 Device: Nasal cannula       Temp (24hrs), Av.3 °F (37.9 °C), Min:99.3 °F (37.4 °C), Max:101.3 °F (38.5 °C)       Intake/Output:   Last shift:      No intake/output data recorded. Last 3 shifts:  1901 -  0700  In: 6365.2 [I.V.:6365.2]  Out: 684 [Urine:159]    Intake/Output Summary (Last 24 hours) at 2020 0756  Last data filed at 2020 0400  Gross per 24 hour   Intake 3183.13 ml   Output 619 ml   Net 2564.13 ml     Hemodynamics:   PAP:   CO:     Wedge:   CI:     CVP:    SVR:       PVR:       Ventilator Settings:  Mode Rate Tidal Volume Pressure FiO2 PEEP   Assist control   500 ml    40 % 6 cm H20     Peak airway pressure: 25 cm H2O    Minute ventilation: 15 l/min      Physical Exam:    General:  Intubated, unresponsive on no sedation    Head:  Normocephalic, without obvious abnormality, atraumatic. Eyes:  Conjunctivae/corneas clear. Nose: Nares normal. Septum midline. Mucosa normal.     Throat: Lips, mucosa, and tongue normal.     Neck: Supple, symmetrical, trachea midline    Back:   Symmetric, no curvature. ROM normal.   Lungs:   Clear to auscultation bilaterally. Chest wall:  No tenderness or deformity. Heart:  Tachycardic, regular   Abdomen:   Soft, non-tender.  Bowel sounds normal.    Extremities: Extremities normal, atraumatic, no cyanosis or clubbing, femoral CVL placed in ER   Skin: Skin color, texture, turgor normal. No rashes or lesions   Neurologic: Unresponsive at this time     Data:     Current Facility-Administered Medications   Medication Dose Route Frequency    influenza vaccine 2019- (6 mos+)(PF) (FLUARIX/FLULAVAL/FLUZONE QUAD) injection 0.5 mL  0.5 mL IntraMUSCular PRIOR TO DISCHARGE    heparin (porcine) injection 5,000 Units  5,000 Units SubCUTAneous BID    dextrose 5 % - 0.45% NaCl infusion  75 mL/hr IntraVENous CONTINUOUS    insulin regular (NOVOLIN R, HUMULIN R) 100 Units in 0.9% sodium chloride 100 mL infusion  0-75 Units/hr IntraVENous TITRATE    insulin lispro (HUMALOG) injection   SubCUTAneous TIDAC    piperacillin-tazobactam (ZOSYN) 3.375 g in 0.9% sodium chloride (MBP/ADV) 100 mL  3.375 g IntraVENous Q12H    sodium chloride (NS) flush 5-40 mL  5-40 mL IntraVENous Q8H    famotidine (PF) (PEPCID) 20 mg in 0.9% sodium chloride 10 mL injection  20 mg IntraVENous DAILY    NOREPINephrine (LEVOPHED) 32,000 mcg in dextrose 5% 250 mL (128 mcg/mL) infusion  0.5-200 mcg/min IntraVENous TITRATE    vancomycin (VANCOCIN) 1,000 mg in 0.9% sodium chloride (MBP/ADV) 250 mL  1,000 mg IntraVENous Q48H    alcohol 62% (NOZIN) nasal  1 Ampule  1 Ampule Topical Q12H    chlorhexidine (ORAL CARE KIT) 0.12 % mouthwash 15 mL  15 mL Oral Q12H                Labs:  Recent Labs     01/09/20  0316 01/08/20  0323 01/07/20  0927   WBC 18.7* 16.3* 14.4*   HGB 13.4 15.0 14.9   HCT 39.7 45.6 48.3   PLT 79* 139* 189     Recent Labs     01/09/20  0316 01/08/20  2345 01/08/20  1604  01/08/20  0323  01/07/20  1304  01/07/20  0927   * 156* 162*   < > 161*   < > 152*   < > 146*   K 4.2 4.1 4.2   < > 4.0   < > 4.3   < > 5.1   * 127* 130*   < > 127*   < > 114*   < > 107   CO2 17* 13* 20*   < > 21   < > 15*   < > 12*   * 434* 130*   < > 463*   < > 1,262*   < > 1,491*   * 118* 130*   < > 129*   < > 123*   < > 127*   CREA 12.30* 10.10* 11.40*   < > 11.10*   < > 9.73*   < > 9.66*   CA 6.2* 5.7* 6.8*   < > 7.4*   < > 8.0*   < > 8.5   MG 2.5* 2.3 2.9*   < > 3.5*   < > 4.2*  --   --    PHOS 4.7  --   --   --  4.7  --  6.1*  --   --    ALB 2.0*  --   --   --  2.5*  --   --   --  3.0*   TBILI 0.7  --   --   --  0.7  --   --   --  0.6   SGOT >2,000*  --   --   --  >2,000*  --   --   --  13*   ALT 3,067*  --   --   --  2,766*  --   --   --  30   INR  --   --   --   --  1.2*  --  1.1  --   --     < > = values in this interval not displayed.      Recent Labs     01/09/20  0106 01/08/20  0525 01/07/20  1319   PHI 7.382 7.314* 7.286*   PCO2I 22.2* 32.3* 25.1*   PO2I 152* 138* 234*   HCO3I 13.2* 16.4* 12.0*   FIO2I 40 50 50     Imaging:  I have personally reviewed the patients radiographs and have reviewed the reports:  No change        Total critical care time exclusive of procedures: 40 minutes  Tray Banegas MD

## 2020-01-09 NOTE — PROGRESS NOTES
Hospitalist Progress Note    NAME: Nohemy Goetz   :  1970   MRN:  [id number]       Assessment / Plan:    DKA/HHS:shock/hypothermic  on pressors now Gluose 1400   --Continue Insulin gtt per protocol  -Blood sugars better control but pt is extremely encephalopathic and uremic   Follow hhs./dka protocol  ekg no acute changes  Management as per ICU team           Change mental status likely secondary to metabolic encephalopathy vrs sepsis/shock  Blood cx positive for gram positive cocci/lactic acid trended down  Cont vanco/zosyn for now  -continue insulin drip   -neuro consult appreciated   Ct head reviewed  -RRT per nephrology team    Gram positive cocci bateremia:  C/W vancomycin  Sensitivities pending       Acute renal failure/ckd  secondary to dehydration /hyperglycemia  Continue IV fluid  Follow-up repeat BMP  -S/P RRT  -Nephrology following      Leukocytosis most likely reactive  Follow blood culture  Follow- calcitonin  Continue IV antibiotic empiric therapy      Hypernatremia   -corrected  on admission  -Trended down to 160  -management per nephrology  -avoid overcorrection         GI Prophylaxis: on famotidine     25.0 - 29.9 Overweight / Body mass index is 26.96 kg/m².       25.0 - 29.9 Overweight / Body mass index is 26.96 kg/m². Code status: Full  Prophylaxis: Hep SQ  Recommended Disposition: TBD     Subjective:     Chief Complaint / Reason for Physician Visit  \"remains intubated and unresponsive, still on levophed\". Discussed with RN events overnight. Review of Systems:  Symptom Y/N Comments  Symptom Y/N Comments   Fever/Chills    Chest Pain     Poor Appetite    Edema     Cough    Abdominal Pain     Sputum    Joint Pain     SOB/HERNANDEZ    Pruritis/Rash     Nausea/vomit    Tolerating PT/OT     Diarrhea    Tolerating Diet     Constipation    Other       Could NOT obtain due to: Intubated and unresponsive     Objective:     VITALS:   Last 24hrs VS reviewed since prior progress note. Most recent are:  Patient Vitals for the past 24 hrs:   Temp Pulse Resp BP SpO2   01/09/20 1530  (!) 108 27 98/65 99 %   01/09/20 1529  (!) 107 30  97 %   01/09/20 1500 98.7 °F (37.1 °C) (!) 107 24 114/73 99 %   01/09/20 1430  (!) 108 25 112/80    01/09/20 1400 98.6 °F (37 °C) (!) 106 17  99 %   01/09/20 1330  (!) 105 21 124/83 99 %   01/09/20 1300 99.3 °F (37.4 °C) (!) 106 24 114/76 99 %   01/09/20 1230  (!) 107 19 107/76 100 %   01/09/20 1215  (!) 108 30  100 %   01/09/20 1200 99.6 °F (37.6 °C) (!) 107 24  99 %   01/09/20 1100 100.1 °F (37.8 °C) (!) 109 26 131/74 100 %   01/09/20 1030  (!) 108 28 120/75 99 %   01/09/20 1000 100.1 °F (37.8 °C) (!) 109 21 107/72 100 %   01/09/20 0930  (!) 109 21 109/71 100 %   01/09/20 0900 100.2 °F (37.9 °C) (!) 112 29 113/80 99 %   01/09/20 0830  (!) 110 28 109/69 100 %   01/09/20 0826  (!) 110 28  100 %   01/09/20 0800 100.3 °F (37.9 °C) (!) 110 29 116/75 100 %   01/09/20 0730  (!) 114 25 118/76 100 %   01/09/20 0700 100.3 °F (37.9 °C) (!) 113 28 107/77 (!) 73 %   01/09/20 0630  (!) 114 (!) 35 117/88 96 %   01/09/20 0600  (!) 114 27 118/79 98 %   01/09/20 0500  (!) 114 28 127/87    01/09/20 0400 (!) 100.7 °F (38.2 °C) (!) 115 28 113/75    01/09/20 0358   25     01/09/20 0300  (!) 118 20 102/71    01/09/20 0200  (!) 122 (!) 32 97/73    01/09/20 0130  (!) 121 30 94/61    01/09/20 0100  (!) 120 (!) 32 (!) 83/59    01/09/20 0016  (!) 123 (!) 34 104/66 99 %   01/09/20 0000 (!) 101 °F (38.3 °C) (!) 121 28 (!) 89/66 99 %   01/08/20 2348   (!) 34     01/08/20 2300  (!) 117 (!) 31 116/79 99 %   01/08/20 2200  (!) 115 28 110/77 100 %   01/08/20 2100  (!) 112 27 113/79 100 %   01/08/20 2023   27     01/08/20 2000 99.9 °F (37.7 °C) (!) 112 23 115/74 99 %   01/08/20 1900 100.3 °F (37.9 °C) (!) 109 22 97/58 95 %   01/08/20 1800  (!) 115 24 100/63 98 %   01/08/20 1700  (!) 118 26 94/58 98 %       Intake/Output Summary (Last 24 hours) at 1/9/2020 [address] filed at 1/9/2020 1549  Gross per 24 hour   Intake 1234.56 ml   Output 110 ml   Net 1124.56 ml        PHYSICAL EXAM:  General: Intubated and unresponsive  EENT:  Left pupils react to light  Resp:  CTA bilaterally, no wheezing or rales. No accessory muscle use  CV:  Regular  rhythm,  No edema  GI:  Soft, Non distended, Non tender.  +Bowel sounds  Neurologic:  Unresponsive to pain  Skin:  No rashes. No jaundice    Reviewed most current lab test results and cultures  YES  Reviewed most current radiology test results   YES  Review and summation of old records today    NO  Reviewed patient's current orders and MAR    YES  PMH/SH reviewed - no change compared to H&P  ________________________________________________________________________  Care Plan discussed with:    Comments   Patient     Family      RN x    Care Manager     Consultant                        Multidiciplinary team rounds were held today with , nursing, pharmacist and clinical coordinator. Patient's plan of care was discussed; medications were reviewed and discharge planning was addressed. ________________________________________________________________________  Total NON critical care TIME:  33   Minutes    Total CRITICAL CARE TIME Spent:   Minutes non procedure based      Comments   >50% of visit spent in counseling and coordination of care     ________________________________________________________________________  Jazmín Vergara MD     Procedures: see electronic medical records for all procedures/Xrays and details which were not copied into this note but were reviewed prior to creation of Plan. LABS:  I reviewed today's most current labs and imaging studies.   Pertinent labs include:  Recent Labs     01/09/20  1325 01/09/20  0316 01/08/20  0323   WBC 17.4* 18.7* 16.3*   HGB 13.0 13.4 15.0   HCT 38.5 39.7 45.6   PLT 67* 79* 139*     Recent Labs     01/09/20  1325 01/09/20  0316 01/08/20  2345  01/08/20  6744 01/07/20  1304  01/07/20  0927   * 158* 156*   < > 161*   < > 152*   < > 146*   K 4.3 4.2 4.1   < > 4.0   < > 4.3   < > 5.1   * 124* 127*   < > 127*   < > 114*   < > 107   CO2 18* 17* 13*   < > 21   < > 15*   < > 12*   * 401* 434*   < > 463*   < > 1,262*   < > 1,491*   * 137* 118*   < > 129*   < > 123*   < > 127*   CREA 13.20* 12.30* 10.10*   < > 11.10*   < > 9.73*   < > 9.66*   CA 6.5* 6.2* 5.7*   < > 7.4*   < > 8.0*   < > 8.5   MG 2.6* 2.5* 2.3   < > 3.5*   < > 4.2*  --   --    PHOS 7.7* 4.7  --   --  4.7  --  6.1*   < >  --    ALB 1.9* 2.0*  --   --  2.5*  --   --   --  3.0*   TBILI  --  0.7  --   --  0.7  --   --   --  0.6   SGOT  --  >2,000*  --   --  >2,000*  --   --   --  13*   ALT  --  3,067*  --   --  2,766*  --   --   --  30   INR  --   --   --   --  1.2*  --  1.1  --   --     < > = values in this interval not displayed.        Signed: Katerine Granda MD

## 2020-01-09 NOTE — PROGRESS NOTES
NEUROLOGY NOTE     CC: Alt mental status    SUBJECTIVE:  No neurological changes since yesterday. Ammonia level is normal.  EEG very slow  CT head normal    HPI  Rakesh Danger is a 52 y.o. male who presents to the hospital because of alt mental status. Pt is intubated and cannot provide any hx. Hx obtained by chart review. According to admission notes \"history of diabetes on Lantus who was brought by the EMS today after being found down at his residence. Per notes patient's friend did not hear from him for the past few days until today when  he decided to go and see him at [facility]. pt was  found him on the floor. No evidence of trauma of note patient was admitted to this hospital back in October 2018 with a similar presentation of DKA was a started on insulin and discharged home per records there is no record that he follow-up with any provider and also this is corroborated by his friend that he said that he does not remember that the patient takes any medications. Patient in the emergency room was found to be hypothermic with a low blood pressure w and a sugar of 1400 patient was an unresponsive patient was intubated in the emergency room. started on Insulin drip/bicar drip and started on on pressors\"    ROS  A ten system review of constitutional, cardiovascular, respiratory, musculoskeletal, endocrine, skin, SHEENT, genitourinary, psychiatric and neurologic systems was obtained and is unremarkable except as stated in HPI     PMH  No past medical history on file. FH  No family history on file. SH  Social History     Socioeconomic History    Marital status: SINGLE     Spouse name: Not on file    Number of children: Not on file    Years of education: Not on file    Highest education level: Not on file   Tobacco Use    Smoking status: Never Smoker    Smokeless tobacco: Never Used   Substance and Sexual Activity    Alcohol use:  Yes    Drug use: No       ALLERGIES  No Known Allergies    PHYSICAL EXAMINATION:   Patient Vitals for the past 24 hrs:   Temp Pulse Resp BP SpO2   01/09/20 1215  (!) 108 30  100 %   01/09/20 1100 100.1 °F (37.8 °C) (!) 109 26 131/74 100 %   01/09/20 1030  (!) 108 28 120/75 99 %   01/09/20 1000 100.1 °F (37.8 °C) (!) 109 21 107/72 100 %   01/09/20 0930  (!) 109 21 109/71 100 %   01/09/20 0900 100.2 °F (37.9 °C) (!) 112 29 113/80 99 %   01/09/20 0830  (!) 110 28 109/69 100 %   01/09/20 0826  (!) 110 28  100 %   01/09/20 0800 100.3 °F (37.9 °C) (!) 110 29 116/75 100 %   01/09/20 0730  (!) 114 25 118/76 100 %   01/09/20 0700 100.3 °F (37.9 °C) (!) 113 28 107/77 (!) 73 %   01/09/20 0630  (!) 114 (!) 35 117/88 96 %   01/09/20 0600  (!) 114 27 118/79 98 %   01/09/20 0500  (!) 114 28 127/87    01/09/20 0400 (!) 100.7 °F (38.2 °C) (!) 115 28 113/75    01/09/20 0358   25     01/09/20 0300  (!) 118 20 102/71    01/09/20 0200  (!) 122 (!) 32 97/73    01/09/20 0130  (!) 121 30 94/61    01/09/20 0100  (!) 120 (!) 32 (!) 83/59    01/09/20 0016  (!) 123 (!) 34 104/66 99 %   01/09/20 0000 (!) 101 °F (38.3 °C) (!) 121 28 (!) 89/66 99 %   01/08/20 2348   (!) 34     01/08/20 2300  (!) 117 (!) 31 116/79 99 %   01/08/20 2200  (!) 115 28 110/77 100 %   01/08/20 2100  (!) 112 27 113/79 100 %   01/08/20 2023   27     01/08/20 2000 99.9 °F (37.7 °C) (!) 112 23 115/74 99 %   01/08/20 1900 100.3 °F (37.9 °C) (!) 109 22 97/58 95 %   01/08/20 1800  (!) 115 24 100/63 98 %   01/08/20 1700  (!) 118 26 94/58 98 %   01/08/20 1600 (!) 101.3 °F (38.5 °C) (!) 120 28 91/60 99 %   01/08/20 1500  (!) 116 27 104/56 99 %   01/08/20 1400  (!) 116 25 126/60 99 %   01/08/20 1300  (!) 116 26 96/49 98 %        General:   General appearance: Pt is in no acute distress   Distal pulses are preserved    Neurological Examination:   Mental Status:  Intubated. Comatose. On no sedation. Cranial Nerves: EOMI normal. Perrl. Corneal is present. Breathing over the vent.      Motor: 0/5 in all 4 ext    Sensation: No response to pain      LAB DATA REVIEWED:    Recent Results (from the past 24 hour(s))   GLUCOSE, POC    Collection Time: 01/08/20  1:11 PM   Result Value Ref Range    Glucose (POC) 117 (H) 65 - 100 mg/dL    Performed by [facility]    Collection Time: 01/08/20  1:12 PM   Result Value Ref Range    Glucose 117 mg/dL    Insulin order 7.8 units/hour    Insulin adminstered 7.8 units/hour    Multiplier 0.136     Low target 150 mg/dL    High target 250 mg/dL    D50 order 0.0 ml    D50 administered 0.00 ml    Minutes until next BG 60 min    Order initials      Administered initials      GLSCOM Comments     GLUCOSE, POC    Collection Time: 01/08/20  2:18 PM   Result Value Ref Range    Glucose (POC) 112 (H) 65 - 100 mg/dL    Performed by [facility]    Collection Time: 01/08/20  2:19 PM   Result Value Ref Range    Glucose 112 mg/dL    Insulin order 5.7 units/hour    Insulin adminstered 5.7 units/hour    Multiplier 0.109     Low target 150 mg/dL    High target 250 mg/dL    D50 order 0.0 ml    D50 administered 0.00 ml    Minutes until next BG 60 min    Order initials      Administered initials      GLSCOM Comments     GLUCOSE, POC    Collection Time: 01/08/20  3:25 PM   Result Value Ref Range    Glucose (POC) 110 (H) 65 - 100 mg/dL    Performed by [facility]    Collection Time: 01/08/20  3:25 PM   Result Value Ref Range    Glucose 110 mg/dL    Insulin order 4.4 units/hour    Insulin adminstered 4.4 units/hour    Multiplier 0.087     Low target 150 mg/dL    High target 250 mg/dL    D50 order 0.0 ml    D50 administered 0.00 ml    Minutes until next BG 60 min    Order initials sl     Administered initials sl     GLSCOM Comments     METABOLIC PANEL, BASIC    Collection Time: 01/08/20  4:04 PM   Result Value Ref Range    Sodium 162 (HH) 136 - 145 mmol/L    Potassium 4.2 3.5 - 5.1 mmol/L    Chloride 130 (H) 97 - 108 mmol/L    CO2 20 (L) 21 - 32 mmol/L    Anion gap 12 5 - 15 mmol/L    Glucose 130 (H) 65 - 100 mg/dL     (H) 6 - 20 MG/DL    Creatinine 11.40 (H) 0.70 - 1.30 MG/DL    BUN/Creatinine ratio 11 (L) 12 - 20      GFR est AA 6 (L) >60 ml/min/1.73m2    GFR est non-AA 5 (L) >60 ml/min/1.73m2    Calcium 6.8 (L) 8.5 - 10.1 MG/DL   MAGNESIUM    Collection Time: 01/08/20  4:04 PM   Result Value Ref Range    Magnesium 2.9 (H) 1.6 - 2.4 mg/dL   GLUCOSE, POC    Collection Time: 01/08/20  4:28 PM   Result Value Ref Range    Glucose (POC) 123 (H) 65 - 100 mg/dL    Performed by [employer]    Collection Time: 01/08/20  4:29 PM   Result Value Ref Range    Glucose 123 mg/dL    Insulin order 4.4 units/hour    Insulin adminstered 4.4 units/hour    Multiplier 0.070     Low target 150 mg/dL    High target 250 mg/dL    D50 order 0.0 ml    D50 administered 0.00 ml    Minutes until next BG 60 min    Order initials sl     Administered initials sl     GLSCOM Comments     GLUCOSE, POC    Collection Time: 01/08/20  5:32 PM   Result Value Ref Range    Glucose (POC) 131 (H) 65 - 100 mg/dL    Performed by [employer]    Collection Time: 01/08/20  5:32 PM   Result Value Ref Range    Glucose 131 mg/dL    Insulin order 4.0 units/hour    Insulin adminstered 4.0 units/hour    Multiplier 0.056     Low target 150 mg/dL    High target 250 mg/dL    D50 order 0.0 ml    D50 administered 0.00 ml    Minutes until next BG 60 min    Order initials sl     Administered initials sl     GLSCOM Comments     GLUCOSE, POC    Collection Time: 01/08/20  6:37 PM   Result Value Ref Range    Glucose (POC) 112 (H) 65 - 100 mg/dL    Performed by [employer]    Collection Time: 01/08/20  6:38 PM   Result Value Ref Range    Glucose 112 mg/dL    Insulin order 2.3 units/hour    Insulin adminstered 2.3 units/hour    Multiplier 0.045     Low target 150 mg/dL    High target 250 mg/dL    D50 order 0.0 ml    D50 administered 0.00 ml    Minutes until next BG 60 min    Order initials sl     Administered initials sl     GLSCOM Comments     GLUCOSE, POC    Collection Time: 01/08/20  7:48 PM   Result Value Ref Range    Glucose (POC) 98 65 - 100 mg/dL    Performed by Zita Solomon    Collection Time: 01/08/20  7:49 PM   Result Value Ref Range    Glucose 98 mg/dL    Insulin order 1.3 units/hour    Insulin adminstered 1.3 units/hour    Multiplier 0.035     Low target 150 mg/dL    High target 250 mg/dL    D50 order 0.0 ml    D50 administered 0.00 ml    Minutes until next BG 60 min    Order initials AK     Administered initials AK     GLSCOM Comments     GLUCOSE, POC    Collection Time: 01/08/20  8:52 PM   Result Value Ref Range    Glucose (POC) 115 (H) 65 - 100 mg/dL    Performed by Carvel Mass    Collection Time: 01/08/20  8:52 PM   Result Value Ref Range    Glucose 115 mg/dL    Insulin order 1.4 units/hour    Insulin adminstered 1.4 units/hour    Multiplier 0.025     Low target 150 mg/dL    High target 250 mg/dL    D50 order 0.0 ml    D50 administered 0.00 ml    Minutes until next BG 60 min    Order initials AK     Administered initials AK     GLSCOM Comments     GLUCOSE, POC    Collection Time: 01/08/20  9:57 PM   Result Value Ref Range    Glucose (POC) 156 (H) 65 - 100 mg/dL    Performed by Carvel Mass    Collection Time: 01/08/20  9:57 PM   Result Value Ref Range    Glucose 156 mg/dL    Insulin order 2.4 units/hour    Insulin adminstered 2.4 units/hour    Multiplier 0.025     Low target 150 mg/dL    High target 250 mg/dL    D50 order 0.0 ml    D50 administered 0.00 ml    Minutes until next BG 60 min    Order initials AK     Administered initials AK     GLSCOM Comments     GLUCOSE, POC    Collection Time: 01/08/20 11:02 PM   Result Value Ref Range    Glucose (POC) 172 (H) 65 - 100 mg/dL    Performed by Carvel Mass    Collection Time: 01/08/20 11:02 PM   Result Value Ref Range    Glucose 172 mg/dL    Insulin order 2.8 units/hour    Insulin adminstered 2.8 units/hour    Multiplier 0.025     Low target 150 mg/dL    High target 250 mg/dL    D50 order 0.0 ml    D50 administered 0.00 ml    Minutes until next BG 60 min    Order initials AK     Administered initials AK     GLSCJULIA Comments     METABOLIC PANEL, BASIC    Collection Time: 01/08/20 11:45 PM   Result Value Ref Range    Sodium 156 (H) 136 - 145 mmol/L    Potassium 4.1 3.5 - 5.1 mmol/L    Chloride 127 (H) 97 - 108 mmol/L    CO2 13 (LL) 21 - 32 mmol/L    Anion gap 16 (H) 5 - 15 mmol/L    Glucose 434 (H) 65 - 100 mg/dL     (H) 6 - 20 MG/DL    Creatinine 10.10 (H) 0.70 - 1.30 MG/DL    BUN/Creatinine ratio 12 12 - 20      GFR est AA 7 (L) >60 ml/min/1.73m2    GFR est non-AA 6 (L) >60 ml/min/1.73m2    Calcium 5.7 (LL) 8.5 - 10.1 MG/DL   MAGNESIUM    Collection Time: 01/08/20 11:45 PM   Result Value Ref Range    Magnesium 2.3 1.6 - 2.4 mg/dL   GLUCOSE, POC    Collection Time: 01/09/20 12:04 AM   Result Value Ref Range    Glucose (POC) 212 (H) 65 - 100 mg/dL    Performed by Chadd Rose    Collection Time: 01/09/20 12:05 AM   Result Value Ref Range    Glucose 212 mg/dL    Insulin order 3.8 units/hour    Insulin adminstered 3.8 units/hour    Multiplier 0.025     Low target 150 mg/dL    High target 250 mg/dL    D50 order 0.0 ml    D50 administered 0.00 ml    Minutes until next BG 60 min    Order initials AK     Administered initials AK     GLSCJULIA Comments     GLUCOSE, POC    Collection Time: 01/09/20  1:01 AM   Result Value Ref Range    Glucose (POC) 281 (H) 65 - 100 mg/dL    Performed by Chadd Rose    Collection Time: 01/09/20  1:03 AM   Result Value Ref Range    Glucose 281 mg/dL    Insulin order 7.7 units/hour    Insulin adminstered 7.7 units/hour    Multiplier 0.035     Low target 150 mg/dL    High target 250 mg/dL    D50 order 0.0 ml    D50 administered 0.00 ml    Minutes until next BG 60 min    Order initials AK     Administered initials AK     GLSCOM Comments     POC G3 - PUL    Collection Time: 01/09/20  1:06 AM   Result Value Ref Range    FIO2 (POC) 40 %    pH (POC) 7.382 7.35 - 7.45      pCO2 (POC) 22.2 (L) 35.0 - 45.0 MMHG    pO2 (POC) 152 (H) 80 - 100 MMHG    HCO3 (POC) 13.2 (L) 22 - 26 MMOL/L    sO2 (POC) 99 (H) 92 - 97 %    Base deficit (POC) 12 mmol/L    Site LEFT RADIAL      Device: VENT      Mode ASSIST CONTROL      Tidal volume 500 ml    Set Rate 14 bpm    PEEP/CPAP (POC) 6 cmH2O    Allens test (POC) YES      Specimen type (POC) ARTERIAL      Total resp. rate 33     GLUCOSE, POC    Collection Time: 01/09/20  2:06 AM   Result Value Ref Range    Glucose (POC) 288 (H) 65 - 100 mg/dL    Performed by Angélica Samaniego    Collection Time: 01/09/20  2:07 AM   Result Value Ref Range    Glucose 288 mg/dL    Insulin order 10.3 units/hour    Insulin adminstered 10.3 units/hour    Multiplier 0.045     Low target 150 mg/dL    High target 250 mg/dL    D50 order 0.0 ml    D50 administered 0.00 ml    Minutes until next BG 60 min    Order initials      Administered initials      GLSCOM Comments     GLUCOSE, POC    Collection Time: 01/09/20  3:11 AM   Result Value Ref Range    Glucose (POC) 259 (H) 65 - 100 mg/dL    Performed by Kylie Lopez    Collection Time: 01/09/20  3:11 AM   Result Value Ref Range    Glucose 259 mg/dL    Insulin order 10.9 units/hour    Insulin adminstered 10.9 units/hour    Multiplier 0.055     Low target 150 mg/dL    High target 250 mg/dL    D50 order 0.0 ml    D50 administered 0.00 ml    Minutes until next BG 60 min    Order initials AK     Administered initials AK     GLSCOM Comments     CBC WITH AUTOMATED DIFF    Collection Time: 01/09/20  3:16 AM   Result Value Ref Range    WBC 18.7 (H) 4.1 - 11.1 K/uL    RBC 4.90 4. 10 - 5.70 M/uL    HGB 13.4 12.1 - 17.0 g/dL    HCT 39.7 36.6 - 50.3 %    MCV 81.0 80.0 - 99.0 FL    MCH 27.3 26.0 - 34.0 PG    MCHC 33.8 30.0 - 36.5 g/dL    RDW 14.5 11.5 - 14.5 %    PLATELET 79 (L) 820 - 400 K/uL    MPV 12.5 8.9 - 12.9 FL    NRBC 0.0 0  WBC    ABSOLUTE NRBC 0.00 0.00 - 0.01 K/uL    NEUTROPHILS 75 32 - 75 %    BAND NEUTROPHILS 13 %    LYMPHOCYTES 8 (L) 12 - 49 %    MONOCYTES 3 (L) 5 - 13 %    EOSINOPHILS 0 0 - 7 %    BASOPHILS 1 0 - 1 %    IMMATURE GRANULOCYTES 0 0.0 - 0.5 %    ABS. NEUTROPHILS 16.4 (H) 1.8 - 8.0 K/UL    ABS. LYMPHOCYTES 1.5 0.8 - 3.5 K/UL    ABS. MONOCYTES 0.6 0.0 - 1.0 K/UL    ABS. EOSINOPHILS 0.0 0.0 - 0.4 K/UL    ABS. BASOPHILS 0.2 (H) 0.0 - 0.1 K/UL    ABS. IMM. GRANS. 0.0 0.00 - 0.04 K/UL    DF AUTOMATED      RBC COMMENTS NORMOCYTIC, NORMOCHROMIC     METABOLIC PANEL, COMPREHENSIVE    Collection Time: 01/09/20  3:16 AM   Result Value Ref Range    Sodium 158 (H) 136 - 145 mmol/L    Potassium 4.2 3.5 - 5.1 mmol/L    Chloride 124 (H) 97 - 108 mmol/L    CO2 17 (L) 21 - 32 mmol/L    Anion gap 17 (H) 5 - 15 mmol/L    Glucose 401 (H) 65 - 100 mg/dL     (H) 6 - 20 MG/DL    Creatinine 12.30 (H) 0.70 - 1.30 MG/DL    BUN/Creatinine ratio 11 (L) 12 - 20      GFR est AA 5 (L) >60 ml/min/1.73m2    GFR est non-AA 4 (L) >60 ml/min/1.73m2    Calcium 6.2 (LL) 8.5 - 10.1 MG/DL    Bilirubin, total 0.7 0.2 - 1.0 MG/DL    ALT (SGPT) 3,067 (H) 12 - 78 U/L    AST (SGOT) >2,000 (H) 15 - 37 U/L    Alk.  phosphatase 139 (H) 45 - 117 U/L    Protein, total 5.8 (L) 6.4 - 8.2 g/dL    Albumin 2.0 (L) 3.5 - 5.0 g/dL    Globulin 3.8 2.0 - 4.0 g/dL    A-G Ratio 0.5 (L) 1.1 - 2.2     MAGNESIUM    Collection Time: 01/09/20  3:16 AM   Result Value Ref Range    Magnesium 2.5 (H) 1.6 - 2.4 mg/dL   PHOSPHORUS    Collection Time: 01/09/20  3:16 AM   Result Value Ref Range    Phosphorus 4.7 2.6 - 4.7 MG/DL   AMMONIA    Collection Time: 01/09/20  3:16 AM   Result Value Ref Range    Ammonia 21 <32 UMOL/L   GLUCOSE, POC    Collection Time: 01/09/20  4:15 AM   Result Value Ref Range    Glucose (POC) 248 (H) 65 - 100 mg/dL Performed by Lupe Morales    Collection Time: 01/09/20  4:16 AM   Result Value Ref Range    Glucose 248 mg/dL    Insulin order 10.3 units/hour    Insulin adminstered 10.3 units/hour    Multiplier 0.055     Low target 150 mg/dL    High target 250 mg/dL    D50 order 0.0 ml    D50 administered 0.00 ml    Minutes until next BG 60 min    Order initials AK     Administered initials AK     GLJEAN CLAUDE Comments     GLUCOSE, POC    Collection Time: 01/09/20  5:12 AM   Result Value Ref Range    Glucose (POC) 241 (H) 65 - 100 mg/dL    Performed by Lupe Morales    Collection Time: 01/09/20  5:13 AM   Result Value Ref Range    Glucose 241 mg/dL    Insulin order 10.0 units/hour    Insulin adminstered 10.0 units/hour    Multiplier 0.055     Low target 150 mg/dL    High target 250 mg/dL    D50 order 0.0 ml    D50 administered 0.00 ml    Minutes until next BG 60 min    Order initials AK     Administered initials NI PEDROZA Comments     GLUCOSE, POC    Collection Time: 01/09/20  6:25 AM   Result Value Ref Range    Glucose (POC) 186 (H) 65 - 100 mg/dL    Performed by Lupe Morales    Collection Time: 01/09/20  6:25 AM   Result Value Ref Range    Glucose 186 mg/dL    Insulin order 6.9 units/hour    Insulin adminstered 6.9 units/hour    Multiplier 0.055     Low target 150 mg/dL    High target 250 mg/dL    D50 order 0.0 ml    D50 administered 0.00 ml    Minutes until next BG 60 min    Order initials AK     Administered initials NI PEDROZA Comments     GLUCOSE, POC    Collection Time: 01/09/20  7:29 AM   Result Value Ref Range    Glucose (POC) 199 (H) 65 - 100 mg/dL    Performed by Kira Hernandez    Collection Time: 01/09/20  7:30 AM   Result Value Ref Range    Glucose 199 mg/dL    Insulin order 7.6 units/hour    Insulin adminstered 7.6 units/hour    Multiplier 0.055     Low target 150 mg/dL    High target 250 mg/dL    D50 order 0.0 ml    D50 administered 0.00 ml    Minutes until next BG 60 min    Order initials SM     Administered initials SM     GLSCOM Comments     GLUCOSE, POC    Collection Time: 01/09/20  8:32 AM   Result Value Ref Range    Glucose (POC) 225 (H) 65 - 100 mg/dL    Performed by Morgan Mitchell    Collection Time: 01/09/20  8:32 AM   Result Value Ref Range    Glucose 225 mg/dL    Insulin order 9.1 units/hour    Insulin adminstered 9.1 units/hour    Multiplier 0.055     Low target 150 mg/dL    High target 250 mg/dL    D50 order 0.0 ml    D50 administered 0.00 ml    Minutes until next  min    Order initials sm     Administered initials sm     GLSCOM Comments     GLUCOSE, POC    Collection Time: 01/09/20 10:41 AM   Result Value Ref Range    Glucose (POC) 157 (H) 65 - 100 mg/dL    Performed by Bishnu Huggins RN    GLUCOSTABILIZER    Collection Time: 01/09/20 10:41 AM   Result Value Ref Range    Glucose 157 mg/dL    Insulin order 5.3 units/hour    Insulin adminstered 5.3 units/hour    Multiplier 0.055     Low target 150 mg/dL    High target 250 mg/dL    D50 order 0.0 ml    D50 administered 0.00 ml    Minutes until next  min    Order initials SM     Administered initials SM     GLSCOM Comments          Imaging review:  CT Head  Normal    EEG  Severe encephalopathy    HOME MEDS  Prior to Admission Medications   Prescriptions Last Dose Informant Patient Reported? Taking?   insulin glargine (LANTUS SOLOSTAR U-100 INSULIN) 100 unit/mL (3 mL) inpn Unknown at Unknown time  No No   Sig: Inject 55 units under the skin nightly.  Please provide needles with Rx.   insulin lispro (HUMALOG KWIKPEN INSULIN) 100 unit/mL kwikpen Unknown at Unknown time  No No   Sig: Inject 15 units under the skin three times daily before meals +  Sliding scale regimen as below, TIDAC (before meals):             140-199=2 u            200-249=3 u  250-299=5 u  300-349=7 u  350 or greaterer = 10u      Facility-Administered Medications: None       CURRENT MEDS  Current Facility-Administered Medications   Medication Dose Route Frequency    bicarbonate dialysis (PRISMASOL) BG K 4/Ca 2.5 5000 ml solution   Extracorporeal DIALYSIS CONTINUOUS    influenza vaccine 2019-20 (6 mos+)(PF) (FLUARIX/FLULAVAL/FLUZONE QUAD) injection 0.5 mL  0.5 mL IntraMUSCular PRIOR TO DISCHARGE    heparin (porcine) injection 5,000 Units  5,000 Units SubCUTAneous BID    dextrose 5 % - 0.45% NaCl infusion  75 mL/hr IntraVENous CONTINUOUS    insulin regular (NOVOLIN R, HUMULIN R) 100 Units in 0.9% sodium chloride 100 mL infusion  0-75 Units/hr IntraVENous TITRATE    insulin lispro (HUMALOG) injection   SubCUTAneous TIDAC    piperacillin-tazobactam (ZOSYN) 3.375 g in 0.9% sodium chloride (MBP/ADV) 100 mL  3.375 g IntraVENous Q12H    sodium chloride (NS) flush 5-40 mL  5-40 mL IntraVENous Q8H    famotidine (PF) (PEPCID) 20 mg in 0.9% sodium chloride 10 mL injection  20 mg IntraVENous DAILY    NOREPINephrine (LEVOPHED) 32,000 mcg in dextrose 5% 250 mL (128 mcg/mL) infusion  0.5-200 mcg/min IntraVENous TITRATE    vancomycin (VANCOCIN) 1,000 mg in 0.9% sodium chloride (MBP/ADV) 250 mL  1,000 mg IntraVENous Q48H    alcohol 62% (NOZIN) nasal  1 Ampule  1 Ampule Topical Q12H    chlorhexidine (ORAL CARE KIT) 0.12 % mouthwash 15 mL  15 mL Oral Q12H       IMPRESSION/RECOMMENDATIONS:  Adamaris Rich is a 52 y.o. male who was found unresponsive at home. He was last seen normal around 2 days ago. He was brought to the hospital and was found to be in DKA. On exam, he is off sedation and intubated. Comatose. +perrl, corneal and is breathing over the vent. Suspect encephalopathy is metabolic. BUN and Na still quite elevated. If not improvement with correction, plan for MRI brain. - Ammonia level is normal  - EEG slowed severe slowing. No seizures  - if no improvement after correction of metabolic abnormality, will proceed with MRI brain to check for ischemic damage.            [place] Jasmine Colon MD  Neurologist     30 min cct provided.

## 2020-01-09 NOTE — PROGRESS NOTES
**Consult Information**  Member Facility: [facility] Kristen Mcelroy MRN: [id number]  Consult ID: [id number]  Facility Time Zone: ET  Date and Time of Request: 01/09/2020 12:33:05 AM  Requesting Clinician: Roma  Patient Name: Deirdre Lentz  YOB: 1970  Gender: Male    **Clinical Note**  Clinical Note: per RN -     \"In in addition to the message I just sent about pt here for DKA/HHk, along with the critical Ca-5.7 and CO2-13, I wanted to add the pt is having tachypnea with resp in the 30's\"    requested RN to contact in-house hospitalist for further eval and recommendations    **Attestation**  Interaction Mode: Phone Only  Phone Duration (mins): 2  Time of Call : 01/09/2020 12:39 AM  Interaction Attestation: Interprofessional internet consultation was delivered through telephonic and/or electronic communication upon the request of the patients treating physician, while the requesting and the rendering provider were not in the same physical location. Written report was provided to the requesting provider. Evaluation Duration (mins): 12    **Physician Signature**  This document was electronically signed by:  Fausto Fuentes MD  01/09/2020 12:49 AM

## 2020-01-09 NOTE — PROGRESS NOTES
Susana Yousif        :  1970        MRN:  [id number]        Assessment :    Plan:  --PETER  CKD  DKA  Hypernatremia  rhabdo - mild  AMS  hypotension --Creatinine worse. Poor UO.  on pressors. Need to start CVVHD. Factor 25. Labs 0400,1200,2000.  4K bath. Sodium slowly better. .    D/W  RN and pt's brother. Critically ill. Subjective:     Chief Complaint:  Intubated. Review of Systems:    Symptom Y/N Comments  Symptom Y/N Comments   Fever/Chills    Chest Pain     Poor Appetite    Edema     Cough    Abdominal Pain     Sputum    Joint Pain     SOB/HERNANDEZ    Pruritis/Rash     Nausea/vomit    Tolerating PT/OT     Diarrhea    Tolerating Diet     Constipation    Other       Could not obtain due to:      Objective:     VITALS:   Last 24hrs VS reviewed since prior progress note.  Most recent are:  Visit Vitals  /75   Pulse (!) 108   Temp 100.3 °F (37.9 °C)   Resp 28   Ht 6' 2\" (1.88 m)   Wt 95.3 kg (210 lb)   SpO2 99%   BMI 26.96 kg/m²       Intake/Output Summary (Last 24 hours) at 2020 1037  Last data filed at 2020 0400  Gross per 24 hour   Intake 2331.9 ml   Output 133 ml   Net 2198.9 ml      Telemetry Reviewed:     PHYSICAL EXAM:  General: NAD  CTA  RRR  abd soft  No edema      Lab Data Reviewed: (see below)    Medications Reviewed: (see below)    PMH/SH reviewed - no change compared to H&P  ________________________________________________________________________  Care Plan discussed with:  Patient     Family      RN     Care Manager                    Consultant:          Comments   >50% of visit spent in counseling and coordination of care       ________________________________________________________________________  Carol Amaya MD     Procedures: see electronic medical records for all procedures/Xrays and details which  were not copied into this note but were reviewed prior to creation of Plan. LABS:  Recent Labs     01/09/20 0316 01/08/20  0323   WBC 18.7* 16.3*   HGB 13.4 15.0   HCT 39.7 45.6   PLT 79* 139*     Recent Labs     01/09/20  0316 01/08/20  2345 01/08/20  1604  01/08/20 0323  01/07/20  1304   * 156* 162*   < > 161*   < > 152*   K 4.2 4.1 4.2   < > 4.0   < > 4.3   * 127* 130*   < > 127*   < > 114*   CO2 17* 13* 20*   < > 21   < > 15*   * 118* 130*   < > 129*   < > 123*   CREA 12.30* 10.10* 11.40*   < > 11.10*   < > 9.73*   * 434* 130*   < > 463*   < > 1,262*   CA 6.2* 5.7* 6.8*   < > 7.4*   < > 8.0*   MG 2.5* 2.3 2.9*   < > 3.5*   < > 4.2*   PHOS 4.7  --   --   --  4.7  --  6.1*    < > = values in this interval not displayed. Recent Labs     01/09/20 0316 01/08/20 0323 01/07/20  0927   SGOT >2,000* >2,000* 13*   * 142* 139*   TP 5.8* 6.9 7.4   ALB 2.0* 2.5* 3.0*   GLOB 3.8 4.4* 4.4*     Recent Labs     01/08/20 0323 01/07/20  1304   INR 1.2* 1.1   PTP 12.5* 11.4*   APTT  --  21.2*      No results for input(s): FE, TIBC, PSAT, FERR in the last 72 hours. Lab Results   Component Value Date/Time    Folate 8.5 10/11/2018 04:39 AM      No results for input(s): PH, PCO2, PO2 in the last 72 hours.   Recent Labs     01/08/20 0323 01/07/20  1134   CPK 16,905* 891*     No components found for: [place]  Lab Results   Component Value Date/Time    Color YELLOW/STRAW 01/07/2020 11:25 AM    Appearance CLEAR 01/07/2020 11:25 AM    Specific gravity 1.026 01/07/2020 11:25 AM    pH (UA) 5.0 01/07/2020 11:25 AM    Protein NEGATIVE  01/07/2020 11:25 AM    Glucose >1,000 (A) 01/07/2020 11:25 AM    Ketone TRACE (A) 01/07/2020 11:25 AM    Bilirubin NEGATIVE  10/06/2018 05:12 AM    Urobilinogen 0.2 01/07/2020 11:25 AM    Nitrites NEGATIVE  01/07/2020 11:25 AM    Leukocyte Esterase NEGATIVE  01/07/2020 11:25 AM    Epithelial cells FEW 01/07/2020 11:25 AM    Bacteria NEGATIVE  01/07/2020 11:25 AM    WBC 0-4 01/07/2020 11:25 AM    RBC 0-5 01/07/2020 11:25 AM MEDICATIONS:  Current Facility-Administered Medications   Medication Dose Route Frequency    potassium chloride 10 mEq in 50 ml IVPB  10 mEq IntraVENous Q1H PRN    calcium chloride 2 g in 0.9% sodium chloride 250 mL IVPB  2 g IntraVENous PRN    bicarbonate dialysis (PRISMASOL) BG K 4/Ca 2.5 5000 ml solution   Extracorporeal DIALYSIS CONTINUOUS    influenza vaccine 2019-20 (6 mos+)(PF) (FLUARIX/FLULAVAL/FLUZONE QUAD) injection 0.5 mL  0.5 mL IntraMUSCular PRIOR TO DISCHARGE    acetaminophen (TYLENOL) suppository 650 mg  650 mg Rectal Q8H PRN    heparin (porcine) injection 5,000 Units  5,000 Units SubCUTAneous BID    heparin (porcine) pf 300 Units  300 Units InterCATHeter PRN    dextrose 5 % - 0.45% NaCl infusion  75 mL/hr IntraVENous CONTINUOUS    insulin regular (NOVOLIN R, HUMULIN R) 100 Units in 0.9% sodium chloride 100 mL infusion  0-75 Units/hr IntraVENous TITRATE    glucagon (GLUCAGEN) injection 1 mg  1 mg IntraMUSCular PRN    glucose chewable tablet 16 g  4 Tab Oral PRN    dextrose 10% infusion 0-250 mL  0-250 mL IntraVENous PRN    insulin lispro (HUMALOG) injection   SubCUTAneous TIDAC    glucose chewable tablet 16 g  4 Tab Oral PRN    glucagon (GLUCAGEN) injection 1 mg  1 mg IntraMUSCular PRN    piperacillin-tazobactam (ZOSYN) 3.375 g in 0.9% sodium chloride (MBP/ADV) 100 mL  3.375 g IntraVENous Q12H    sodium chloride (NS) flush 5-40 mL  5-40 mL IntraVENous Q8H    sodium chloride (NS) flush 5-40 mL  5-40 mL IntraVENous PRN    ondansetron (ZOFRAN) injection 4 mg  4 mg IntraVENous Q4H PRN    famotidine (PF) (PEPCID) 20 mg in 0.9% sodium chloride 10 mL injection  20 mg IntraVENous DAILY    NOREPINephrine (LEVOPHED) 32,000 mcg in dextrose 5% 250 mL (128 mcg/mL) infusion  0.5-200 mcg/min IntraVENous TITRATE    vancomycin (VANCOCIN) 1,000 mg in 0.9% sodium chloride (MBP/ADV) 250 mL  1,000 mg IntraVENous Q48H    alcohol 62% (NOZIN) nasal  1 Ampule  1 Ampule Topical Q12H    chlorhexidine (ORAL CARE KIT) 0.12 % mouthwash 15 mL  15 mL Oral Q12H

## 2020-01-09 NOTE — PROGRESS NOTES
Pharmacy Automatic Renal Dosing Protocol - Antimicrobials    Indication for Antimicrobials: bacteremia/septic shock    Current Regimen of Each Antimicrobial:  Zosyn 3.375g Iv every 12 (Start Date ; Day # 3)  Vancomycin 100 mg q48h (start date: , day 3)    Previous Antimicrobial Therapy:   n/a    Goal Level: VANCOMYCIN TROUGH GOAL RANGE  Vancomycin Trough: 15 - 20 mcg/mL  (AUC: 400 - 600 mg/hr/Liter/day)     Date Dose & Interval Measured (mcg/mL) Extrapolated (mcg/mL)                       Date & time of next level: before 2nd dose    Significant Cultures:   : paired blood cx - GPRs in  bottles, pending   UA negative  : influenza - negative    Radiology / Imaging results: (X-ray, CT scan or MRI):   : CT Head - pending  : chest xray: Impression: No acute process. ET tube and NG tube satisfactory position    Paralysis, amputations, malnutrition: none    Labs:  Recent Labs     20  1325 20  0316 20  2345 20  1604  20  0323   CREA  --  12.30* 10.10* 11.40*   < > 11.10*   BUN  --  137* 118* 130*   < > 129*   WBC 17.4* 18.7*  --   --   --  16.3*    < > = values in this interval not displayed. Temp (24hrs), Av.4 °F (38 °C), Min:99.9 °F (37.7 °C), Max:101.3 °F (38.5 °C)    Creatinine Clearance (mL/min) or Dialysis: 8.4 mL/min (IBW)   *CVVHD to start  *    Impression/Plan:   Plan to start CVVHD today, will increase antibiotic doses in anticipation   Increase zosyn to 3.375g IV q8h  Increase vancomycin to 1500 mg q24h  Getting frequent BMPs due to DKA/HHS  Antimicrobial stop date pending     Pharmacy will follow daily and adjust medications as appropriate for renal function and/or serum levels.     Thank you,  JACINTA Wilkins

## 2020-01-09 NOTE — DIALYSIS
[address]       [phone]    Orders   Mode: CVVHD   Factor: 25 ml/hr   DFR: 2,000 ml/hr   Blood Flow Rate: 250 ml/min (lowered to 200 ml/min)     Metrics   BP / HR: 97/63 // 104   Blood Flow Rate: 200 ml/min d/t high pressures   AP:                         -99   RP: 120   TMP: 31   PD: 49   FP: 200   DFR: 2,000 ml/hr     Comments / Plan:       CRRT initiated per MD order. Patient, code status, labs, and orders verified. Consents obtained. Line placement confirmed by chest xray. Filter set-up, primed, tested and running well. RIJ temporary non-tunneled CVC, dressing CDI with bio-patch dated 1/9/20. No signs of redness, drainage, or infection visualized. Each catheter limb disinfected for 60 seconds per limb with alcohol swabs. Caps removed, dialysis CVC hub scrubbed with Prevantics for 5 seconds, followed by a 5 second dry time per Hospital P&P. +asp/+flush x 2 ports. Lines reversed visible and connections secure with blood warmer to return line at 37*C. Education, pre and post to Primary RN. BFR lowered and lines reversed due to high access pressures.

## 2020-01-09 NOTE — INTERDISCIPLINARY ROUNDS
Critical care interdisciplinary rounds held on 01/09/2020. Following members present, Pharmacy, Case Management, Respiratory Therapy, Physical Therapy and Nutrition. Led by Colton Peterson RN and Dr. Nhung Dawkins. Plan of care discussed. See clinical pathway for plan of care and interventions and desired outcomes. Nephrology following, possible CRRT today.

## 2020-01-09 NOTE — PROGRESS NOTES
01/09/20 0556   ABCDEF Bundle   SBT Safety Screen Passed No   SBT Screen Reason for Failure Vasopressor use; Increased inspiratory effort   Weaning Parameters   Spontaneous Breathing Trial Complete No (Comments)

## 2020-01-10 NOTE — CONSULTS
Events noted. Pt now s/p hemodialysis with improved electrolytes. CK continues to increase and LFT's remain extremely elevated. Regarding blood sugar readings I suspect poor perfusion on pressors  is responsible for decreased delivery of insulin to tissues and hence increased insulin requirements. I continue to recommend use of glucostabilizer but we may need to switch to alternative. Will see patient later today for full assessment and note.

## 2020-01-10 NOTE — PROGRESS NOTES
Hospitalist Progress Note    NAME: Jimy Mata   :  1970   MRN:  [id number]       Assessment / Plan:    Shock due to multi-system organ failure due to Severe DKA/Rhabdomyolysis/Shock Liver/ATN  - on pressors, wean as able     DKA/HHS:shock/hypothermic  on pressors now Gluose 1400   --Continue Insulin gtt per protocol  -Blood sugars better control but pt is extremely encephalopathic and uremic   Follow hhs./dka protocol  ekg no acute changes  Management as per ICU team    S/p PEA Cardiac arrest on 1/10  - multifactorial due to Hyperkalemia/Severe Acidosis/Severe Rhabdomyolysis/PETER  - d/w brother at length about poor prognosis at this time and likely will not survive hospitalization. He understand and wishes to continue with full measures.   -Possibly with CNS Ischemia due to initial even and now worsening due to cardiac arrest.     Acute renal failure with ATN due to Hypovolemia/Severe Rhabdomyolysis  - CVVH started   -Nephrology following    Hyperkalemia  - correct with CVVH  - renal following     Shock Liver  - likely due to hypovolemia. - monitor     Severe Rhabdomyolysis   - due to prolonged immobilization   - worsening  - IVF     Change mental status likely secondary to metabolic encephalopathy   +positve Blood cx likely contaminant  Cont vanco/zosyn for now  - repeat bcx negative  - empiric vanc and zosyn   -neuro consult appreciated   Ct head reviewed    Leukocytosis most likely reactive  Follow blood culture  Follow- calcitonin  Continue IV antibiotic empiric therapy      Hypernatremia   -corrected  on admission  -Trended down to 160  -management per nephrology  - resolved      Severe Thrombocytopenia, probably reactive  - monitor      GI Prophylaxis: on famotidine     25.0 - 29.9 Overweight / Body mass index is 26.96 kg/m².       25.0 - 29.9 Overweight / Body mass index is 26.96 kg/m².     Code status: Full  Prophylaxis: Hep SQ  Recommended Disposition: TBD     Patient has extremely poor prognosis and will likely not survive this hospitalization. I have discussed patient's condition with his brother who is patient's POA. Pt's brother wishes for full measures at this time. FULL CODE        Subjective:     Chief Complaint / Reason for Physician Visit  \"remains intubated and unresponsive  S/p cardiac arrest this Am with ROSC   Agonal breathing on vent, unresponsive     Review of Systems:  Symptom Y/N Comments  Symptom Y/N Comments   Fever/Chills    Chest Pain     Poor Appetite    Edema     Cough    Abdominal Pain     Sputum    Joint Pain     SOB/HERNANDEZ    Pruritis/Rash     Nausea/vomit    Tolerating PT/OT     Diarrhea    Tolerating Diet     Constipation    Other       Could NOT obtain due to: Intubated and unresponsive     Objective:     VITALS:   Last 24hrs VS reviewed since prior progress note.  Most recent are:  Patient Vitals for the past 24 hrs:   Temp Pulse Resp BP SpO2   01/10/20 1130  (!) 109 23 116/44 (!) 71 %   01/10/20 1127  (!) 109 26  (!) 70 %   01/10/20 1115  (!) 110 23 101/65 (!) 73 %   01/10/20 1100 99.3 °F (37.4 °C) (!) 111 23 (!) 115/34 (!) 72 %   01/10/20 1045  (!) 106 23 (!) 73/45    01/10/20 1030  (!) 115 23 114/50 (!) 76 %   01/10/20 1015  (!) 118 23 98/48 (!) 76 %   01/10/20 1000 99.2 °F (37.3 °C) (!) 121 23 117/50    01/10/20 0945  (!) 126 25 126/57    01/10/20 0932  (!) 124 22 116/69    01/10/20 0930  (!) 130 (!) 44 115/65    01/10/20 0928  (!) 133 (!) 39 106/63    01/10/20 0926  (!) 136 (!) 44 107/58    01/10/20 0924  (!) 140 (!) 36 106/71    01/10/20 0922  (!) 142 (!) 38 (!) 87/66    01/10/20 0920  (!) 145 27 130/41 (!) 65 %   01/10/20 0919  (!) 147 27 128/64 (!) 45 %   01/10/20 0918  (!) 147 21 120/72 (!) 41 %   01/10/20 0916  (!) 147 21  (!) 41 %   01/10/20 0915  (!) 144 28 114/68 (!) 42 %   01/10/20 0913  (!) 113 21 122/44 94 %   01/10/20 0910  98 (!) 32 100/60 92 %   01/10/20 0906  85      01/10/20 0906  (!) 55 19     01/10/20 0853  (!) 105 20 (!) 88/61 96 %   01/10/20 0845  (!) 109 20 (!) 84/41    01/10/20 0838  (!) 117 21 (!) 85/61    01/10/20 0830  (!) 105 19 115/58    01/10/20 0827  70 24 (!) 86/38    01/10/20 0826  (!) 53 24     01/10/20 0822     95 %   01/10/20 0815  89 20 (!) 87/26    01/10/20 0800 98.4 °F (36.9 °C) 90 21 (!) 86/39    01/10/20 0747  91 22 (!) 83/45    01/10/20 0746  91 24     01/10/20 0745  92 21 (!) 73/36    01/10/20 0730  92 21 (!) 107/14    01/10/20 0715  93 21 (!) 90/31 95 %   01/10/20 0704  93 21 95/48 (!) 79 %   01/10/20 0700  98 21 (!) 89/40 (!) 55 %   01/10/20 0645  93 21 126/56    01/10/20 0630  93 22 114/44 96 %   01/10/20 0600  98 21 (!) 88/54 (!) 81 %   01/10/20 0530  94 20 109/65 100 %   01/10/20 0515  94 21 90/67 100 %   01/10/20 0500  95 21 102/59 97 %   01/10/20 0459  93 20 105/56 97 %   01/10/20 0430  93 20 (!) 94/39 94 %   01/10/20 0425  94  117/56    01/10/20 0415  96 19 100/69 90 %   01/10/20 0406 98.2 °F (36.8 °C) 98 20 117/56 95 %   01/10/20 0349  89 21 97/44    01/10/20 0338  87 21  100 %   01/10/20 0334  85 18 (!) 142/35    01/10/20 0318  89 21 (!) 74/30    01/10/20 0315  89 20 (!) 102/12 (!) 73 %   01/10/20 0305  89 21 99/70 (!) 80 %   01/10/20 0300     (!) 76 %   01/10/20 0245  87 20 (!) 89/42 (!) 88 %   01/10/20 0230  89 19 (!) 78/41 (!) 85 %   01/10/20 0215  91 20 94/57 90 %   01/10/20 0208  91 19 (!) 94/32 94 %   01/10/20 0200  93 19 (!) 80/47 95 %   01/10/20 0145  90 19 107/65    01/10/20 0115  94 18 114/63    01/10/20 0100  98 18 117/59    01/10/20 0045  100 18 141/76 (!) 66 %   01/10/20 0030 99.5 °F (37.5 °C) (!) 103 18 126/65 92 %   01/09/20 2345  (!) 103 17 115/61    01/09/20 2343  (!) 102 18  99 %   01/09/20 2302  (!) 111 19 130/87 99 %   01/09/20 2300  (!) 111 19  99 %   01/09/20 2245  (!) 112 21 139/88    01/09/20 2225  (!) 103 19 (!) 61/47    01/09/20 2219  (!) 102 21 (!) 70/48    01/09/20 2215  100 21 (!) 88/27    01/09/20 2149  (!) 107 19 95/68 94 %   01/09/20 2115  93 22 100/50    01/09/20 2000 98.9 °F (37.2 °C) (!) 104 26 101/74 98 %   01/09/20 1945  (!) 104 21 91/70 99 %   01/09/20 1930  (!) 101 20  96 %   01/09/20 1900 98.3 °F (36.8 °C) (!) 105 18  (!) 75 %   01/09/20 1845  (!) 101 20 93/78    01/09/20 1830  (!) 101 19 (!) 89/70    01/09/20 1815  (!) 101 19 (!) 88/68    01/09/20 1800 98.2 °F (36.8 °C) 95 19 90/74    01/09/20 1745  98 20 (!) 81/56    01/09/20 1730  (!) 103 25 (!) 88/55    01/09/20 1700 98.2 °F (36.8 °C) (!) 108 23 (!) 85/55 (!) 81 %   01/09/20 1630  (!) 103 22 94/63 94 %   01/09/20 1629  (!) 104  97/63    01/09/20 1600 98.4 °F (36.9 °C) (!) 104 21 93/65 94 %   01/09/20 1530  (!) 108 27 98/65 99 %   01/09/20 1529  (!) 107 30  97 %   01/09/20 1500 98.7 °F (37.1 °C) (!) 107 24 114/73 99 %   01/09/20 1430  (!) 108 25 112/80    01/09/20 1400 98.6 °F (37 °C) (!) 106 17  99 %   01/09/20 1330  (!) 105 21 124/83 99 %   01/09/20 1300 99.3 °F (37.4 °C) (!) 106 24 114/76 99 %   01/09/20 1230  (!) 107 19 107/76 100 %   01/09/20 1215  (!) 108 30  100 %   01/09/20 1200 99.6 °F (37.6 °C) (!) 107 24  99 %       Intake/Output Summary (Last 24 hours) at 1/10/2020 1137  Last data filed at 1/10/2020 0758  Gross per 24 hour   Intake 4840.37 ml   Output 2666 ml   Net 2174.37 ml        PHYSICAL EXAM:  General: Intubated and unresponsive  EENT:  Pupil dilated and sluggish bilaterally  Resp:  Good air entry   CV:  Regular  rhythm,  No edema  GI:  Soft, ND   Neurologic:  Unresponsive to pain  Skin:  No rashes.   No jaundice    Reviewed most current lab test results and cultures  YES  Reviewed most current radiology test results   YES  Review and summation of old records today    NO  Reviewed patient's current orders and MAR    YES  PMH/SH reviewed - no change compared to H&P  ________________________________________________________________________  Care Plan discussed with: Comments   Patient     Family      RN x    Care Manager     Consultant                        Multidiciplinary team rounds were held today with , nursing, pharmacist and clinical coordinator. Patient's plan of care was discussed; medications were reviewed and discharge planning was addressed. ________________________________________________________________________  Total NON critical care TIME:   Minutes    Total CRITICAL CARE TIME Spent:   40 Minutes non procedure based      Comments   >50% of visit spent in counseling and coordination of care     ________________________________________________________________________  Elliott German MD     Procedures: see electronic medical records for all procedures/Xrays and details which were not copied into this note but were reviewed prior to creation of Plan. LABS:  I reviewed today's most current labs and imaging studies.   Pertinent labs include:  Recent Labs     01/10/20  0716 01/10/20  0539 01/09/20  1325 01/09/20  0316   WBC  --  16.9* 17.4* 18.7*   HGB  --  11.3* 13.0 13.4   HCT  --  36.4* 38.5 39.7   PLT 56* 37* 67* 79*     Recent Labs     01/10/20  1034 01/10/20  0539 01/10/20  0157 01/09/20  2111 01/09/20  1754  01/09/20  0316  01/08/20  0323  01/07/20  1304   NA  --  144 148* 150* 158*   < > 158*   < > 161*   < > 152*   K  --  6.2* 5.9* 2.4* 4.3   < > 4.2   < > 4.0   < > 4.3   CL  --  114* 117* 122* 132*   < > 124*   < > 127*   < > 114*   CO2  --  12* 12* 14* 13*   < > 17*   < > 21   < > 15*   GLU  --  403* 354* 312* 212*   < > 401*   < > 463*   < > 1,262*   BUN  --  90* 102* 122* 117*   < > 137*   < > 129*   < > 123*   CREA  --  8.35* 9.26* 10.90* 9.97*   < > 12.30*   < > 11.10*   < > 9.73*   CA  --  8.0* 6.6* 8.0* 5.3*   < > 6.2*   < > 7.4*   < > 8.0*   MG  --  2.4 2.5* 2.4 2.0   < > 2.5*   < > 3.5*   < > 4.2*   PHOS  --  10.0*  --  7.5* 6.1*   < > 4.7  --  4.7  --  6.1*   ALB  --  2.1*  --  2.1* 1.6*   < > 2.0*  --  2.5*   < >  -- TBILI  --  2.2*  --   --   --   --  0.7  --  0.7  --   --    SGOT  --  >2,000*  --   --   --   --  >2,000*  --  >2,000*  --   --    ALT  --  3,270*  --   --   --   --  3,067*  --  2,766*  --   --    INR 1.3*  --   --   --   --   --   --   --  1.2*  --  1.1    < > = values in this interval not displayed.        Signed: Nancy Almeida MD

## 2020-01-10 NOTE — PROGRESS NOTES
[facility]  Pulmonary, Critical Care, and Sleep Medicine    Name: Sue Kelly MRN: [id number]   : 1970 Hospital: [facility]   Date: 1/10/2020        Critical Care    IMPRESSION:   · Acute respiratory failure - intubated for airway protection  · Severe mixed DKA/HHS  · Acute (and possibly chronic) anuric renal failure  · Shock - mixed hypovolemic/septic   · Hypernatremia - severe/profound   · Rhabdomyolysis - severe  · Shock liver  · Metabolic (?anoxic) encephalopathy       RECOMMENDATIONS:   · Ventilator support   · RRT per nephrology   · Pressors   · Monitor CK - still rising  · LFTs continue to rise  · Insulin drip per DKA protocol  · Serial neuro exams  · Empiric antibiotics pending culture data   · DVT/PUD prophylaxis  · Critically ill. Prognosis increasingly poor. Do not think he is going to survive. Subjective/History:     1-10-20: worsening hypotension. More pressors. Unresponsive. 20: critically ill on mechanical ventilation/pressors. Still unresponsive on no sedation. 20: remains comatose on no sedation. Critically ill on mechanical ventilation/pressors. Low urine output. Initial history: This patient has been seen and evaluated at the request of Dr. Guillermo Carlson for respiratory failure. Patient is a 52 y.o. male with DM, presented to the ER today unresponsive. He was intubated on arrival due to failure to protect his airway. He is now unresponsive, intubated, on pressors. He has severe DKA. Last known well several days ago. The patient can provide no history at this time. No past medical history on file. Past Surgical History:   Procedure Laterality Date    HX WISDOM TEETH EXTRACTION      IR INSERT NON TUNL CVC OVER 5 YRS  2020      Prior to Admission medications    Medication Sig Start Date End Date Taking?  Authorizing Provider   insulin glargine (LANTUS SOLOSTAR U-100 INSULIN) 100 unit/mL (3 mL) inpn Inject 55 units under the skin nightly.  Please provide needles with Rx. 10/12/18   Estelle Mccartney MD   insulin lispro (HUMALOG KWIKPEN INSULIN) 100 unit/mL kwikpen Inject 15 units under the skin three times daily before meals +  Sliding scale regimen as below, TIDAC (before meals):             140-199=2 u            200-249=3 u  250-299=5 u  300-349=7 u  350 or greaterer = 10u 10/12/18   Estelle Mccartney MD     Current Facility-Administered Medications   Medication Dose Route Frequency    bicarbonate dialysis (PRISMASOL) BG K 2/Ca 3.5 5000 ml solution   Extracorporeal DIALYSIS CONTINUOUS    piperacillin-tazobactam (ZOSYN) 3.375 g in 0.9% sodium chloride (MBP/ADV) 100 mL  3.375 g IntraVENous Q8H    vancomycin (VANCOCIN) 1,500 mg in 0.9% sodium chloride 500 mL IVPB  1,500 mg IntraVENous Q24H    CVVHD communication reminder - please noptify pharmacy if CVVHD started/stopped  1 Each Other BID    balsam peru-castor oil (VENELEX) ointment   Topical BID    influenza vaccine 2019-20 (6 mos+)(PF) (FLUARIX/FLULAVAL/FLUZONE QUAD) injection 0.5 mL  0.5 mL IntraMUSCular PRIOR TO DISCHARGE    dextrose 5 % - 0.45% NaCl infusion  75 mL/hr IntraVENous CONTINUOUS    insulin regular (NOVOLIN R, HUMULIN R) 100 Units in 0.9% sodium chloride 100 mL infusion  0-75 Units/hr IntraVENous TITRATE    insulin lispro (HUMALOG) injection   SubCUTAneous TIDAC    sodium chloride (NS) flush 5-40 mL  5-40 mL IntraVENous Q8H    famotidine (PF) (PEPCID) 20 mg in 0.9% sodium chloride 10 mL injection  20 mg IntraVENous DAILY    NOREPINephrine (LEVOPHED) 32,000 mcg in dextrose 5% 250 mL (128 mcg/mL) infusion  0.5-200 mcg/min IntraVENous TITRATE    alcohol 62% (NOZIN) nasal  1 Ampule  1 Ampule Topical Q12H    chlorhexidine (ORAL CARE KIT) 0.12 % mouthwash 15 mL  15 mL Oral Q12H     No Known Allergies   Social History     Tobacco Use    Smoking status: Never Smoker    Smokeless tobacco: Never Used   Substance Use Topics    Alcohol use: Yes      No family history on file. Review of Systems:  Review of systems not obtained due to patient factors. Objective:   Vital Signs:    Visit Vitals  BP (!) 86/39 (BP 1 Location: Right arm, BP Patient Position: At rest)   Pulse 90   Temp 98.4 °F (36.9 °C)   Resp 21   Ht 6' 2\" (1.88 m)   Wt 95.3 kg (210 lb)   SpO2 95%   BMI 26.96 kg/m²       O2 Device: Ventilator       Temp (24hrs), Av °F (37.2 °C), Min:98.2 °F (36.8 °C), Max:100.2 °F (37.9 °C)       Intake/Output:   Last shift:      01/10 07 - 01/10 1900  In: -   Out: 140   Last 3 shifts: 1901 - 01/10 0700  In: 5465.4 [I.V.:5465.4]  Out: 2626 [Urine:115]    Intake/Output Summary (Last 24 hours) at 1/10/2020 0806  Last data filed at 1/10/2020 0758  Gross per 24 hour   Intake 4840.37 ml   Output 2666 ml   Net 2174.37 ml     Hemodynamics:   PAP:   CO:     Wedge:   CI:     CVP:    SVR:       PVR:       Ventilator Settings:  Mode Rate Tidal Volume Pressure FiO2 PEEP   Assist control   500 ml    30 % 6 cm H20     Peak airway pressure: 24 cm H2O    Minute ventilation: 14.3 l/min      Physical Exam:    General:  Intubated, unresponsive on no sedation    Head:  Normocephalic, without obvious abnormality, atraumatic. Eyes:  Conjunctivae/corneas clear. Nose: Nares normal. Septum midline. Mucosa normal.     Throat: Lips, mucosa, and tongue normal.     Neck: Supple, symmetrical, trachea midline    Back:   Symmetric, no curvature. ROM normal.   Lungs:   Clear to auscultation bilaterally. Chest wall:  No tenderness or deformity. Heart:  Tachycardic, regular   Abdomen:   Soft, non-tender.  Bowel sounds normal.    Extremities: Extremities normal, atraumatic, no cyanosis or clubbing, femoral CVL placed in ER   Skin: Skin color, texture, turgor normal. No rashes or lesions   Neurologic: Unresponsive at this time     Data:     Current Facility-Administered Medications   Medication Dose Route Frequency    bicarbonate dialysis (PRISMASOL) BG K 2/Ca 3.5 5000 ml solution   Extracorporeal DIALYSIS CONTINUOUS    piperacillin-tazobactam (ZOSYN) 3.375 g in 0.9% sodium chloride (MBP/ADV) 100 mL  3.375 g IntraVENous Q8H    vancomycin (VANCOCIN) 1,500 mg in 0.9% sodium chloride 500 mL IVPB  1,500 mg IntraVENous Q24H    CVVHD communication reminder - please noptify pharmacy if CVVHD started/stopped  1 Each Other BID    balsam peru-castor oil (VENELEX) ointment   Topical BID    influenza vaccine 2019-20 (6 mos+)(PF) (FLUARIX/FLULAVAL/FLUZONE QUAD) injection 0.5 mL  0.5 mL IntraMUSCular PRIOR TO DISCHARGE    dextrose 5 % - 0.45% NaCl infusion  75 mL/hr IntraVENous CONTINUOUS    insulin regular (NOVOLIN R, HUMULIN R) 100 Units in 0.9% sodium chloride 100 mL infusion  0-75 Units/hr IntraVENous TITRATE    insulin lispro (HUMALOG) injection   SubCUTAneous TIDAC    sodium chloride (NS) flush 5-40 mL  5-40 mL IntraVENous Q8H    famotidine (PF) (PEPCID) 20 mg in 0.9% sodium chloride 10 mL injection  20 mg IntraVENous DAILY    NOREPINephrine (LEVOPHED) 32,000 mcg in dextrose 5% 250 mL (128 mcg/mL) infusion  0.5-200 mcg/min IntraVENous TITRATE    alcohol 62% (NOZIN) nasal  1 Ampule  1 Ampule Topical Q12H    chlorhexidine (ORAL CARE KIT) 0.12 % mouthwash 15 mL  15 mL Oral Q12H                Labs:  Recent Labs     01/10/20  0716 01/10/20  0539 01/09/20  1325 01/09/20  0316   WBC  --  16.9* 17.4* 18.7*   HGB  --  11.3* 13.0 13.4   HCT  --  36.4* 38.5 39.7   PLT 56* 37* 67* 79*     Recent Labs     01/10/20  0539 01/10/20  0157 01/09/20  2111 01/09/20  1754  01/09/20  0316  01/08/20  0323  01/07/20  1304    148* 150* 158*   < > 158*   < > 161*   < > 152*   K 6.2* 5.9* 2.4* 4.3   < > 4.2   < > 4.0   < > 4.3   * 117* 122* 132*   < > 124*   < > 127*   < > 114*   CO2 12* 12* 14* 13*   < > 17*   < > 21   < > 15*   * 354* 312* 212*   < > 401*   < > 463*   < > 1,262*   BUN 90* 102* 122* 117*   < > 137*   < > 129*   < > 123*   CREA 8.35* 9.26* 10.90* 9.97* < > 12.30*   < > 11.10*   < > 9.73*   CA 8.0* 6.6* 8.0* 5.3*   < > 6.2*   < > 7.4*   < > 8.0*   MG 2.4 2.5* 2.4 2.0   < > 2.5*   < > 3.5*   < > 4.2*   PHOS 10.0*  --  7.5* 6.1*   < > 4.7  --  4.7  --  6.1*   ALB 2.1*  --  2.1* 1.6*   < > 2.0*  --  2.5*  --   --    TBILI 2.2*  --   --   --   --  0.7  --  0.7  --   --    SGOT >2,000*  --   --   --   --  >2,000*  --  >2,000*  --   --    ALT 3,270*  --   --   --   --  3,067*  --  2,766*  --   --    INR  --   --   --   --   --   --   --  1.2*  --  1.1    < > = values in this interval not displayed.      Recent Labs     01/10/20  0328 01/09/20  0106 01/08/20  0525   PHI 7.185* 7.382 7.314*   PCO2I 31.7* 22.2* 32.3*   PO2I 563* 152* 138*   HCO3I 12.0* 13.2* 16.4*   FIO2I 100 40 50     Imaging:  I have personally reviewed the patients radiographs and have reviewed the reports:  No change        Total critical care time exclusive of procedures: 40 minutes  Tom Khan MD

## 2020-01-10 NOTE — PROGRESS NOTES
Responded to code blue in room 2528. CPR in progress for PEA arrest. Please see code [facility] documentation for interventions and medications. Family present in the hallway. Dr. Susie Mcgregor and Dr. Navi Reyna present at the bedside. Code efforts terminated at [phone]. Family to the bedside. Pastoral care present for support. Patient extubated immediately at the request of the family.

## 2020-01-10 NOTE — PROGRESS NOTES
01/10/20 0558   ABCDEF Bundle   SAT Safety Screen Passed No   SAT Screen Reason for Failure   (pressors too high levophed is 75)

## 2020-01-10 NOTE — PROGRESS NOTES
Responded to CODE BLUE    Pt had PEA arrest, on arrival CPR was in progress. Pt received multiple rounds of EPI, Bicarb, Calcium Gluc, Amio X 1 during code. Pt was shocked X 1 for VFib. Patient already had a CODE blue earlier this AM and deemed to have poor prognosis based on fact that he had severe Rhabdomyolysis, ATN, Hyperkalemia, Shock, Shock Liver. After 15 minutes of CODE, failed to regain ROSC. Patient pronounced at 13:22 PM hours. Patient's brother at bedside and notified. TOD 13:22 PM Hours.

## 2020-01-10 NOTE — PROGRESS NOTES
Death Summary     Patient: Jannette Torres       MRN: [id number]       YOB: 1970       Age: 52 y.o. Date of admission:  1/7/2020    Date of death:  1/10/2020    Primary care provider:  None     Admitting provider:  Jocelyn Zamorano MD    Discharging provider:  Linn Cox MD     Consultations  · IP CONSULT TO NEUROLOGY  · IP CONSULT TO NEPHROLOGY  · IP CONSULT TO ENDOCRINOLOGY  · IP CONSULT TO PULMONOLOGY    Procedures    Admission diagnoses  · DKA (diabetic ketoacidoses) ([facility] [address] [E11.10]  · Hypothermia [T68. XXXA]    Please refer to the admission history and physical for details on the presenting problem. Final discharge diagnoses and brief hospital course    51 yo male with PMHx Dm on Lantus, admitted after being found unresponsive. Pt found to have BG of 1400. Patient was found to have Hyperosmolar state with COMA, Acute metabolic encephalopathy and shock. Pt was started on Pressors and insulin gtt as well as empiric antibiotics. Pt was intubated on admission for Airway protection. Pt has corrected Na at 173 on admission. Pt acutely decompensated on 1/9 with worsening acidosis, decreased urine output. Pt was started on CVVH . On 1/10,  Had cardiac arrest with PEA in AM . Pt was found to have CK >100K, ATN, Shock liver, Hyperkalemia. Pt has another cardiac arrest later in day. Pt was coded for for 15 minutes without ROSC.  Pt was pronounced at 13:22pm.       Last vital signs recorded:  Visit Vitals  BP (!) 102/38   Pulse (!) 174   Temp 99.6 °F (37.6 °C)   Resp 23   Ht 6' 2\" (1.88 m)   Wt 95.3 kg (210 lb)   SpO2 (!) 71%   BMI 26.96 kg/m²          Recent Labs     01/10/20  0716 01/10/20  0539 01/09/20  1325 01/09/20  0316   WBC  --  16.9* 17.4* 18.7*   HGB  --  11.3* 13.0 13.4   HCT  --  36.4* 38.5 39.7   PLT 56* 37* 67* 79*     Recent Labs     01/10/20  1034 01/10/20  0539 01/10/20  0157 01/09/20  2111 * 144 148* 150*   K 7.8* 6.2* 5.9* 2.4*   * 114* 117* 122*   CO2 12* 12* 12* 14*   BUN 92* 90* 102* 122*   CREA 9.20* 8.35* 9.26* 10.90*   * 403* 354* 312*   CA 8.6 8.0* 6.6* 8.0*   MG 3.2* 2.4 2.5* 2.4   PHOS >13.5* 10.0*  --  7.5*     Recent Labs     01/10/20  0539 01/09/20  2111 01/09/20  1754  01/09/20  0316 01/08/20  0323   SGOT >2,000*  --   --   --  >2,000* >2,000*   ALT 3,270*  --   --   --  3,067* 2,766*   *  --   --   --  139* 142*   TBILI 2.2*  --   --   --  0.7 0.7   TP 6.0*  --   --   --  5.8* 6.9   ALB 2.1* 2.1* 1.6*   < > 2.0* 2.5*   GLOB 3.9  --   --   --  3.8 4.4*    < > = values in this interval not displayed. Recent Labs     01/10/20  1034 01/08/20  0323   INR 1.3* 1.2*   PTP 13.5* 12.5*   APTT 24.5  --       No results for input(s): FE, TIBC, PSAT, FERR in the last 72 hours. No results for input(s): PH, PCO2, PO2 in the last 72 hours. Recent Labs     01/10/20  0539 01/09/20  1325 01/08/20  0323   ,380* 87,783* 16,905*     Lab Results   Component Value Date/Time    Glucose (POC) 116 (H) 01/10/2020 12:57 PM    Glucose (POC) 95 01/10/2020 11:54 AM    Glucose (POC) 216 (H) 01/10/2020 10:41 AM    Glucose (POC) 132 (H) 01/10/2020 09:29 AM    Glucose (POC) 108 (H) 01/10/2020 08:24 AM       Pertinent imaging studies:      ---------------------------------    Chronic Diagnoses:    Problem List as of 1/10/2020 Never Reviewed          Codes Class Noted - Resolved    Hypothermia ICD-10-CM: T68. Wesley Webster  ICD-9-CM: 991.6  1/7/2020 - Present        DKA (diabetic ketoacidoses) ([facility] ICD-10-CM: E11.10  ICD-9-CM: 250.10  10/6/2018 - Present                Signed:  Radha Jackson MD                 Hospitalist                 1/10/2020                 2:18 PM        Cc: None

## 2020-01-10 NOTE — INTERDISCIPLINARY ROUNDS
Critical care interdisciplinary rounds held on 01/10/2020. Following members present, Pharmacy, Diabetes Treatment, Case Management, Physical Therapy and Nutrition. Led by CHANDNI Mejia. Plan of care discussed. See clinical pathway for plan of care and interventions and desired outcomes.

## 2020-01-10 NOTE — PROGRESS NOTES
Arminda Artis        :  1970        MRN:  [id number]        Assessment :    Plan:  --PETER  CKD  DKA  Hypernatremia  rhabdo - mild  AMS  hypotension --CVVHD. Factor 25. Labs 0400,1200,2000.  2K bath. Hel during codes. Ora Speller to resume. Potassium treated acutely. S/P arrest X 2 this AM.      Critically ill. Prognosis appears to be quite grim. D/W RN. Subjective:     Chief Complaint:  Intubated. Review of Systems:    Symptom Y/N Comments  Symptom Y/N Comments   Fever/Chills    Chest Pain     Poor Appetite    Edema     Cough    Abdominal Pain     Sputum    Joint Pain     SOB/HERNANDEZ    Pruritis/Rash     Nausea/vomit    Tolerating PT/OT     Diarrhea    Tolerating Diet     Constipation    Other       Could not obtain due to:      Objective:     VITALS:   Last 24hrs VS reviewed since prior progress note.  Most recent are:  Visit Vitals  /69   Pulse (!) 124   Temp 98.4 °F (36.9 °C)   Resp 22   Ht 6' 2\" (1.88 m)   Wt 95.3 kg (210 lb)   SpO2 (!) 65%   BMI 26.96 kg/m²       Intake/Output Summary (Last 24 hours) at 1/10/2020 1003  Last data filed at 1/10/2020 0758  Gross per 24 hour   Intake 4840.37 ml   Output 2666 ml   Net 2174.37 ml      Telemetry Reviewed:     PHYSICAL EXAM:  General: NAD  CTA  RRR  abd soft  No edema      Lab Data Reviewed: (see below)    Medications Reviewed: (see below)    PMH/SH reviewed - no change compared to H&P  ________________________________________________________________________  Care Plan discussed with:  Patient     Family      RN     Care Manager                    Consultant:          Comments   >50% of visit spent in counseling and coordination of care       ________________________________________________________________________  Esteban Franks MD     Procedures: see electronic medical records for all procedures/Xrays and details which  were not copied into this note but were reviewed prior to creation of Plan. LABS:  Recent Labs     01/10/20  0716 01/10/20  0539 01/09/20  1325   WBC  --  16.9* 17.4*   HGB  --  11.3* 13.0   HCT  --  36.4* 38.5   PLT 56* 37* 67*     Recent Labs     01/10/20  0539 01/10/20  0157 01/09/20 2111 01/09/20  1754    148* 150* 158*   K 6.2* 5.9* 2.4* 4.3   * 117* 122* 132*   CO2 12* 12* 14* 13*   BUN 90* 102* 122* 117*   CREA 8.35* 9.26* 10.90* 9.97*   * 354* 312* 212*   CA 8.0* 6.6* 8.0* 5.3*   MG 2.4 2.5* 2.4 2.0   PHOS 10.0*  --  7.5* 6.1*     Recent Labs     01/10/20  0539 01/09/20 2111 01/09/20 1754 01/09/20  0316 01/08/20  0323   SGOT >2,000*  --   --   --  >2,000* >2,000*   *  --   --   --  139* 142*   TP 6.0*  --   --   --  5.8* 6.9   ALB 2.1* 2.1* 1.6*   < > 2.0* 2.5*   GLOB 3.9  --   --   --  3.8 4.4*    < > = values in this interval not displayed. Recent Labs     01/08/20  0323 01/07/20  1304   INR 1.2* 1.1   PTP 12.5* 11.4*   APTT  --  21.2*      No results for input(s): FE, TIBC, PSAT, FERR in the last 72 hours. Lab Results   Component Value Date/Time    Folate 8.5 10/11/2018 04:39 AM      No results for input(s): PH, PCO2, PO2 in the last 72 hours.   Recent Labs     01/10/20  0539 01/09/20  1325 01/08/20  0323   ,380* 87,783* 16,905*     No components found for: [place]  Lab Results   Component Value Date/Time    Color YELLOW/STRAW 01/07/2020 11:25 AM    Appearance CLEAR 01/07/2020 11:25 AM    Specific gravity 1.026 01/07/2020 11:25 AM    pH (UA) 5.0 01/07/2020 11:25 AM    Protein NEGATIVE  01/07/2020 11:25 AM    Glucose >1,000 (A) 01/07/2020 11:25 AM    Ketone TRACE (A) 01/07/2020 11:25 AM    Bilirubin NEGATIVE  10/06/2018 05:12 AM    Urobilinogen 0.2 01/07/2020 11:25 AM    Nitrites NEGATIVE  01/07/2020 11:25 AM    Leukocyte Esterase NEGATIVE  01/07/2020 11:25 AM    Epithelial cells FEW 01/07/2020 11:25 AM    Bacteria NEGATIVE  01/07/2020 11:25 AM    WBC 0-4 01/07/2020 11:25 AM    RBC 0-5 01/07/2020 11:25 AM       MEDICATIONS:  Current Facility-Administered Medications   Medication Dose Route Frequency    bicarbonate dialysis (PRISMASOL) BG K 2/Ca 3.5 5000 ml solution   Extracorporeal DIALYSIS CONTINUOUS    piperacillin-tazobactam (ZOSYN) 3.375 g in 0.9% sodium chloride (MBP/ADV) 100 mL  3.375 g IntraVENous Q8H    sodium bicarbonate (8.4%) 150 mEq in sterile water 1,000 mL infusion   IntraVENous CONTINUOUS    calcium chloride 2 g in 0.9% sodium chloride 250 mL IVPB  2 g IntraVENous PRN    magnesium sulfate 2 g/50 ml IVPB (premix or compounded)  2 g IntraVENous Q2H PRN    vancomycin (VANCOCIN) 1,500 mg in 0.9% sodium chloride 500 mL IVPB  1,500 mg IntraVENous Q24H    CVVHD communication reminder - please noptify pharmacy if CVVHD started/stopped  1 Each Other BID    balsam peru-castor oil (VENELEX) ointment   Topical BID    fentaNYL citrate (PF) injection 25 mcg  25 mcg IntraVENous Q4H PRN    influenza vaccine 2019-20 (6 mos+)(PF) (FLUARIX/FLULAVAL/FLUZONE QUAD) injection 0.5 mL  0.5 mL IntraMUSCular PRIOR TO DISCHARGE    acetaminophen (TYLENOL) suppository 650 mg  650 mg Rectal Q8H PRN    heparin (porcine) pf 300 Units  300 Units InterCATHeter PRN    dextrose 5 % - 0.45% NaCl infusion  75 mL/hr IntraVENous CONTINUOUS    insulin regular (NOVOLIN R, HUMULIN R) 100 Units in 0.9% sodium chloride 100 mL infusion  0-75 Units/hr IntraVENous TITRATE    dextrose 10% infusion 0-250 mL  0-250 mL IntraVENous PRN    insulin lispro (HUMALOG) injection   SubCUTAneous TIDAC    glucose chewable tablet 16 g  4 Tab Oral PRN    glucagon (GLUCAGEN) injection 1 mg  1 mg IntraMUSCular PRN    sodium chloride (NS) flush 5-40 mL  5-40 mL IntraVENous Q8H    sodium chloride (NS) flush 5-40 mL  5-40 mL IntraVENous PRN    ondansetron (ZOFRAN) injection 4 mg  4 mg IntraVENous Q4H PRN    famotidine (PF) (PEPCID) 20 mg in 0.9% sodium chloride 10 mL injection  20 mg IntraVENous DAILY    NOREPINephrine (LEVOPHED) 32,000 mcg in dextrose 5% 250 mL (128 mcg/mL) infusion  0.5-200 mcg/min IntraVENous TITRATE    alcohol 62% (NOZIN) nasal  1 Ampule  1 Ampule Topical Q12H    chlorhexidine (ORAL CARE KIT) 0.12 % mouthwash 15 mL  15 mL Oral Q12H

## 2020-01-10 NOTE — PROGRESS NOTES
Care Management:    Patient admitted with DKA and is vented and critical in the ICU. Code was called this am .     Patient Full Code at this time. His brother is his NOK. Chart has been reviewed and we will cont to follow as appropriate.      Juan M Mejia RN ACM [id number]

## 2020-01-10 NOTE — PROGRESS NOTES
01/10/20 0558   ABCDEF Bundle   SBT Safety Screen Passed No   SBT Screen Reason for Failure Vasopressor use

## 2020-01-10 NOTE — PROGRESS NOTES
Called to examine patient who has . No response to verbal and tactile stimuli. No respiratory effort. Absent heart sounds and pulses. Pupils fixed and dilated. Patient pronounced dead at 1:22 PM hours.      Jaky Molina MD   Hospitalist, Internal Medicine

## 2020-01-10 NOTE — DIALYSIS
At bedside to restart patient s/p code blue. Upon arrival patient coded again. Family decision to withdraw care. Machine cleaned and collected. Old cassette discarded in red bio-hazard bag. Scheduling office notified.

## 2020-01-10 NOTE — PROGRESS NOTES
Responded to code blue. Patient passed. Provided support for family. Rev.  Sammi Reese MDiv   For  Assistance Page [phone]

## 2020-01-10 NOTE — PROGRESS NOTES
Spiritual Care Assessment/Progress Note  [facility]      NAME: Nohemy Goetz      MRN: [id number]  AGE: 52 y.o. SEX: male  Anglican Affiliation: Caodaism   Language: English     1/10/2020     Total Time (in minutes): 12     Spiritual Assessment begun in [facility] CRITICAL CARE 2 through conversation with:         []Patient        [x] Family    [] Friend(s)        Reason for Consult: Code Blue/99     Spiritual beliefs: (Please include comment if needed)     [] Identifies with a ronny tradition:         [] Supported by a ronny community:            [] Claims no spiritual orientation:           [] Seeking spiritual identity:                [] Adheres to an individual form of spirituality:           [x] Not able to assess:                           Identified resources for coping:      [] Prayer                               [] Music                  [] Guided Imagery     [] Family/friends                 [] Pet visits     [] Devotional reading                         [x] Unknown     [] Other:                                               Interventions offered during this visit: (See comments for more details)          Family/Friend(s): Prayer (assurance of)     Plan of Care:     [] Support spiritual and/or cultural needs    [] Support AMD and/or advance care planning process      [] Support grieving process   [] Coordinate Rites and/or Rituals    [] Coordination with community clergy   [x] No spiritual needs identified at this time   [] Detailed Plan of Care below (See Comments)  [] Make referral to Music Therapy  [] Make referral to Pet Therapy     [] Make referral to Addiction services  [] Make referral to [facility]  [] Make referral to Spiritual Care Partner  [] No future visits requested        [x] Follow up visits as needed     Comments:  Responded to code blue. Provided support to family  Advised of  availability. Chaplains will follow as able and/or needed. Rev.  Edson Farnsworth, Sammi  [address] [facility] [address] Page [phone])

## 2020-01-10 NOTE — PROGRESS NOTES
Nutrition:  Chart reviewed, case discussed during CCU rounds. MST triggered for PU, DTI to nose. Pt with very poor prognosis. Multiple codes. Not appropriate for nutrition support at this time. If pt survives the weekend will provide complete nutrition assessment and appropriate nutrition support recommendations.       Tiffany, [address]   Pager [phone]

## 2020-01-10 NOTE — DIALYSIS
[facility]       [phone]    Orders   Mode: CVVHD   Factor: 0   UFR: 2000ML/HR   Blood Flow Rate: 200 ML/MIN     Metrics   BP / HR: 117/56   94   Blood Flow Rate: 200 ML/MIN   AP:                         -108   RP: 111   TMP: 21   PD: 66   FP: 238   UFR: 2000 ML/HR     Comments / Plan:   [id number] filter running well with no indication for change at this time. Consents, patient, code status, labs and orders verified. RIJ temporary CVC, dressing CDI with bio-patch dated 1/9/20. No signs of redness, drainage, or infection visualized. Lines visible and connections secure with blood warmer to return line at 37*C. Education, pre and post to primary RN.

## 2020-01-10 NOTE — PROGRESS NOTES
Tele hospitalist      Patient intubated, no sedation.   Fentanyl 25 mcg IV prn ordered for pain    2330  RN paged regarding K 2.4  Advised RN to re page Nephrology and or intensivist for orders regarding CVVH  Currently on Potassium 10 meq q 1hr

## 2020-01-10 NOTE — PROGRESS NOTES
1915- Bedside and Verbal shift change report given to CHANDNI Mcgrath (oncoming nurse) by Naomy Musa (offgoing nurse). Report included the following information SBAR, Kardex, Intake/Output, MAR, Med Rec Status and Cardiac Rhythm Sinus Tach. 1945- Shift assessment performed. Patient is intubated with no sedation. Spontaneous eye movement noted. No response to any stimuli. Strong cough. Sinus tach. Levophed at 20 mcg. Cool extremities. CVVHD contiued with at factor of 25, with 4 K bags. 2125- Nurse supervisor at bedside to administer cathflo to 2 port on right femoral central line. 2156- On call Nephrology paged regarding critical Potassium 2.4 .   2300- Paged Nephrology x2 regarding critical lab results. Patient received KCL per standing prn order. 0000- Reassessment performed see flow sheet. 0200- Pulse trending low 80's . Levophed titrated see MAR.  0330- On call Pulm Paged. Spoke with MD Tatiana Beckett regarding ABG. Patient's rate changed to 22 due to bicarb of 12, received one amp of Bicarb. 105 [facility] spoke to on call [facility] regarding K 5.9. Received telephone order to change over to 2 k bags. 80- Labs drawn. [id number] [phone]  Report given to CHANDNI Talley. BMP resulted Nephrology paged. No new orders at this time continue with 2kbags.

## 2020-01-10 NOTE — PROGRESS NOTES
Bedside shift change report given to Jelena Lainez (oncoming nurse) by Luke Tate (offgoing nurse). Report included the following information SBAR, Kardex, Intake/Output, MAR, Recent Results and Cardiac Rhythm NSR c PVC.    0800 Episode of increased ectpoy, bradycardia (40s) and change to ventricular rhythm with inability to obtain BP >10. Norepi increased, BP captured. Pt converted to NSR by self.    0900 Pt began having increase in ectopy, going into bradycardic (50s) ventricular rhythm. Unable to obtain BP reading. Unable to obtain pulse anywhere. Code Blue called and CPR initiated. See Code Blue documentation for details. ROSC achieved. CVVH stopped during code. Unable to rinse back blood at that time. Family updated on condition and poor prognosis. Still requesting full code. 1847 Carmen Broussard paged and made aware of need to change setup and reconnect CVVH.    7770-8411 Pt began increase in ectopy, but maintaining rate (80-90s). Also noted ST elevation. Dr Hans Leo made aware. 1250 began having rapidly fluctuating HR (100-150s). 1304 Began to become bradycardic, increasing ectopy and ventricular rhythm. Check for pulse, unable to find. Amos Brooke called at [phone] and CPR started. See Code Blue documentation for details. Dr. Hans Leo and Dr Adelina Teague responded to code. Dr Adelina Teague took lead. Family at bedside. Continued to attempt to achieve ROSC for 15 without success. TOD called by Dr Adelina Teague at [phone]. Pt extubated at request of family. [phone] [facility] updated on pt demise, and ruled out pt for donation of any kind.

## 2020-01-10 NOTE — PROGRESS NOTES
telemed- paged by RN as follows- Platelets 37 . No active bleeding noted. Plan: Spoke with RN on the phone. stop heparin and repeat platelet count stat.  MD to be paged with results asap r/o HIT

## 2020-01-11 LAB — PF4 HEPARIN CMPLX AB SER-ACNC: 0.19 OD (ref 0–0.4)

## 2020-01-12 LAB
BACTERIA SPEC CULT: NORMAL
SERVICE CMNT-IMP: NORMAL

## 2020-01-13 LAB
BACTERIA SPEC CULT: NORMAL
SERVICE CMNT-IMP: NORMAL

## 2020-01-13 NOTE — DISCHARGE SUMMARY
Death Summary     Patient: Yaritza Wilson       MRN: [id number]       YOB: 1970       Age: 52 y.o. Date of admission:  1/7/2020    Date of death:  1/13/2020    Primary care provider:  None     Admitting provider:  Eduardo Garza MD    Discharging provider:  Myles Ortega MD     Consultations  IP CONSULT TO NEUROLOGY  IP CONSULT TO NEPHROLOGY  IP CONSULT TO ENDOCRINOLOGY  IP CONSULT TO PULMONOLOGY    Procedures    Admission diagnoses  DKA (diabetic ketoacidoses) ([facility] [address] [E11.10]  Hypothermia [T68. XXXA]    Please refer to the admission history and physical for details on the presenting problem. Final discharge diagnoses and brief hospital course    53 yo male with PMHx Dm on Lantus, admitted after being found unresponsive. Pt found to have BG of 1400. Patient was found to have Hyperosmolar state with COMA, Acute metabolic encephalopathy and shock. Pt was started on Pressors and insulin gtt as well as empiric antibiotics. Pt was intubated on admission for Airway protection. Pt has corrected Na at 173 on admission. Pt acutely decompensated on 1/9 with worsening acidosis, decreased urine output. Pt was started on CVVH . On 1/10,  Had cardiac arrest with PEA in AM . Pt was found to have CK >100K, ATN, Shock liver, Hyperkalemia. Pt has another cardiac arrest later in day. Pt was coded for for 15 minutes without ROSC. Pt was pronounced at 13:22pm.       Last vital signs recorded:  Visit Vitals  BP (!) 102/38   Pulse (!) 174   Temp 99.6 °F (37.6 °C)   Resp 23   Ht 6' 2\" (1.88 m)   Wt 95.3 kg (210 lb)   SpO2 (!) 71%   BMI 26.96 kg/m²          No results for input(s): WBC, HGB, HCT, PLT, HGBEXT, HCTEXT, PLTEXT, HGBEXT, HCTEXT, PLTEXT in the last 72 hours. No results for input(s): NA, K, CL, CO2, BUN, CREA, GLU, CA, MG, PHOS, URICA in the last 72 hours.   No results for input(s): SGOT, GPT, ALT, AP, TBIL, TBILI, TP, ALB, GLOB, GGT, AML, LPSE in the last 72 hours. No lab exists for component: AMYP, HLPSE  No results for input(s): INR, PTP, APTT, INREXT, INREXT in the last 72 hours. No results for input(s): FE, TIBC, PSAT, FERR in the last 72 hours. No results for input(s): PH, PCO2, PO2 in the last 72 hours. No results for input(s): CPK, CKMB in the last 72 hours. No lab exists for component: TROPONINI  Lab Results   Component Value Date/Time    Glucose (POC) 116 (H) 01/10/2020 12:57 PM    Glucose (POC) 95 01/10/2020 11:54 AM    Glucose (POC) 216 (H) 01/10/2020 10:41 AM    Glucose (POC) 132 (H) 01/10/2020 09:29 AM    Glucose (POC) 108 (H) 01/10/2020 08:24 AM       Pertinent imaging studies:      ---------------------------------    Chronic Diagnoses:    Problem List as of 1/10/2020 Never Reviewed          Codes Class Noted - Resolved    Hypothermia ICD-10-CM: T68. Jakob Bliss  ICD-9-CM: 991.6  1/7/2020 - Present        DKA (diabetic ketoacidoses) ([facility] ICD-10-CM: E11.10  ICD-9-CM: 250.10  10/6/2018 - Present                Signed:  Lisa Richardson MD                 Hospitalist                 1/13/2020                 2:18 PM        Cc: None

## 2020-01-14 LAB
SRA 100IU/ML UFH SER-ACNC: <1 % (ref 0–20)
SRA UFH SER-IMP: NORMAL
UNFRAC HEPARIN LOW DOSE: <1 % (ref 0–20)

## 2020-01-15 LAB
BACTERIA SPEC CULT: ABNORMAL
BACTERIA SPEC CULT: ABNORMAL
SERVICE CMNT-IMP: ABNORMAL

## 2020-01-19 NOTE — ED PROVIDER NOTES
EMERGENCY DEPARTMENT HISTORY AND PHYSICAL EXAM 
 
 
Date: 1/7/2020 Patient Name: Edenilson Padilla Patient Age and Sex: 52 y.o. male History of Presenting Illness Chief Complaint Patient presents with  Altered mental status History Provided By: EMS 
 
HPI: Edenilson Padilla, is a 52 y.o. male with a history of diabetes, on Lantus but according to his roommate has not been compliant with the medication, is brought by EMS after being found unresponsive on the floor of his room today, lying face down. His roommate had not heard from him in a few days, which is unusual and prompted him to call someone to check in on the patient. The patient was found in the aforementioned state when a friend came to check on him. On review of his records, the patient was admitted to this hospital in October 2018 with AMS and was found to be in DKA. He was admitted at that time to the ICU, required days of insulin ggt and was discharged home on insulin. There is no evidence of a follow up since that discharge and his roommate states that he has never seen the patient check his sugar or inject insulin. No known recent illnesses. On arrival to the ED, patient is found to be hypothermic, hypoxemic, obtunded/unresponsive. No evidence of trauma. Glucose en route reading \"high\". Pt denies any other alleviating or exacerbating factors. There are no other complaints, changes or physical findings at this time. No past medical history on file. Past Surgical History:  
Procedure Laterality Date  HX WISDOM TEETH EXTRACTION    
 IR INSERT NON TUNL CVC OVER 5 YRS  1/9/2020 PCP: None Past History Past Medical History: No past medical history on file. Past Surgical History: 
Past Surgical History:  
Procedure Laterality Date  HX WISDOM TEETH EXTRACTION    
 IR INSERT NON TUNL CVC OVER 5 YRS  1/9/2020 Family History: No family history on file. Social History: 
Social History Tobacco Use  
  Smoking status: Never Smoker  Smokeless tobacco: Never Used Substance Use Topics  Alcohol use: Yes  Drug use: No  
 
 
Allergies: 
No Known Allergies Current Medications: No current facility-administered medications on file prior to encounter. Current Outpatient Medications on File Prior to Encounter Medication Sig Dispense Refill  insulin glargine (LANTUS SOLOSTAR U-100 INSULIN) 100 unit/mL (3 mL) inpn Inject 55 units under the skin nightly. Please provide needles with Rx. 6 Adjustable Dose Pre-filled Pen Syringe 1  
 insulin lispro (HUMALOG KWIKPEN INSULIN) 100 unit/mL kwikpen Inject 15 units under the skin three times daily before meals + Sliding scale regimen as below, TIDAC (before meals):            
140-199=2 X           
200-249=3 u 
250-299=5 u 
300-349=7 u 
350 or greaterer = 10u 2 Package 1 Review of Systems Review of Systems Unable to perform ROS: Mental status change Physical Exam  
Physical Exam 
Vitals signs and nursing note reviewed. Constitutional:   
   Appearance: He is toxic-appearing. Comments: unresponsive HENT:  
   Head: Atraumatic. Mouth/Throat:  
   Comments: dry Eyes:  
   General: No scleral icterus. Pupils: Pupils are equal, round, and reactive to light. Neck: Musculoskeletal: Neck supple. Cardiovascular:  
   Rate and Rhythm: Regular rhythm. Tachycardia present. Heart sounds: No murmur. Pulmonary:  
   Effort: Tachypnea and respiratory distress present. Breath sounds: Normal breath sounds. Abdominal:  
   General: Bowel sounds are normal.  
   Palpations: Abdomen is soft. Musculoskeletal:     
   General: No swelling. Lymphadenopathy:  
   Cervical: No cervical adenopathy. Skin: 
   General: Skin is dry. Capillary Refill: Capillary refill takes 2 to 3 seconds. Coloration: Skin is not cyanotic. Findings: No rash. Neurological:  
   Mental Status: He is unresponsive. Diagnostic Study Results Labs - 
09:27am 
 Ref. Range 1/7/2020 09:27 GLUCOSE,FAST - POC Latest Ref Range: 65 - 100 mg/dL >700 (HH) Sodium (POC) Latest Ref Range: 136 - 145 mmol/L 148 (H) Potassium (POC) Latest Ref Range: 3.5 - 5.1 mmol/L 5.1 Chloride, POC Latest Ref Range: 98 - 107 mmol/L 115 (H) CO2 (POC) Latest Ref Range: 21 - 32 mmol/L 14 (LL)  
BUN (POC) Latest Ref Range: 9 - 20 mg/dL 116 (H) Anion gap, POC Latest Ref Range: 10 - 20 mmol/L 25 (H) Creatinine, POC Latest Ref Range: 0.6 - 1.3 mg/dL 8.5 (H) Calcium, ionized (POC) Latest Ref Range: 1.12 - 1.32 mmol/L 1.10 (L) GFRNA, POC Latest Ref Range: >60 ml/min/1.73m2 7 (L) GFRAA, POC Latest Ref Range: >60 ml/min/1.73m2 8 (L) Hematocrit (POC) Latest Ref Range: 36.6 - 50.3 % 47 Comment Latest Units:   Comment Not Indicated. WBC Latest Ref Range: 4.1 - 11.1 K/uL 14.4 (H) NRBC Latest Ref Range: 0  WBC 0.5 (H) RBC Latest Ref Range: 4.10 - 5.70 M/uL 5.44 HGB Latest Ref Range: 12.1 - 17.0 g/dL 14.9 HCT Latest Ref Range: 36.6 - 50.3 % 48.3 MCV Latest Ref Range: 80.0 - 99.0 FL 88.8 MCH Latest Ref Range: 26.0 - 34.0 PG 27.4 MCHC Latest Ref Range: 30.0 - 36.5 g/dL 30.8 RDW Latest Ref Range: 11.5 - 14.5 % 14.9 (H) PLATELET Latest Ref Range: 150 - 400 K/uL 189 MPV Latest Ref Range: 8.9 - 12.9 FL 12.8 NEUTROPHILS Latest Ref Range: 32 - 75 % 88 (H) BAND NEUTROPHILS Latest Units: % 3 LYMPHOCYTES Latest Ref Range: 12 - 49 % 7 (L) MONOCYTES Latest Ref Range: 5 - 13 % 2 (L) EOSINOPHILS Latest Ref Range: 0 - 7 % 0  
BASOPHILS Latest Ref Range: 0 - 1 % 0 IMMATURE GRANULOCYTES Latest Ref Range: 0.0 - 0.5 % 0  
DF Latest Units:   MANUAL ABSOLUTE NRBC Latest Ref Range: 0.00 - 0.01 K/uL 0.07 (H)  
ABS. NEUTROPHILS Latest Ref Range: 1.8 - 8.0 K/UL 13.1 (H)  
ABS. IMM. GRANS. Latest Ref Range: 0.00 - 0.04 K/UL 0.0  
ABS. LYMPHOCYTES Latest Ref Range: 0.8 - 3.5 K/UL 1.0 ABS. MONOCYTES Latest Ref Range: 0.0 - 1.0 K/UL 0.3  
ABS. EOSINOPHILS Latest Ref Range: 0.0 - 0.4 K/UL 0.0  
ABS. BASOPHILS Latest Ref Range: 0.0 - 0.1 K/UL 0.0  
RBC COMMENTS Latest Units:   NORMOCYTIC, NORMOCHROMIC Sodium Latest Ref Range: 136 - 145 mmol/L 146 (H) Potassium Latest Ref Range: 3.5 - 5.1 mmol/L 5.1 Chloride Latest Ref Range: 97 - 108 mmol/L 107 CO2 Latest Ref Range: 21 - 32 mmol/L 12 (LL) Anion gap Latest Ref Range: 5 - 15 mmol/L 27 (H) Glucose Latest Ref Range: 65 - 100 mg/dL 1,491 (HH) BUN Latest Ref Range: 6 - 20 MG/ (H) Creatinine Latest Ref Range: 0.70 - 1.30 MG/DL 9.66 (H) BUN/Creatinine ratio Latest Ref Range: 12 - 20   13 Calcium Latest Ref Range: 8.5 - 10.1 MG/DL 8.5  
GFR est non-AA Latest Ref Range: >60 ml/min/1.73m2 6 (L)  
GFR est AA Latest Ref Range: >60 ml/min/1.73m2 7 (L) Bilirubin, total Latest Ref Range: 0.2 - 1.0 MG/DL 0.6 Protein, total Latest Ref Range: 6.4 - 8.2 g/dL 7.4 Albumin Latest Ref Range: 3.5 - 5.0 g/dL 3.0 (L) Globulin Latest Ref Range: 2.0 - 4.0 g/dL 4.4 (H) A-G Ratio Latest Ref Range: 1.1 - 2.2   0.7 (L) ALT (SGPT) Latest Ref Range: 12 - 78 U/L 30 AST Latest Ref Range: 15 - 37 U/L 13 (L) Alk. phosphatase Latest Ref Range: 45 - 117 U/L 139 (H)  
 
09:36am 
 Ref. Range 1/7/2020 09:36 Lactic Acid (POC) Latest Ref Range: 0.40 - 2.00 mmol/L 3.55 (HH) Radiologic Studies -  
XR CHEST PORT Final Result Impression: No acute process. Medical Decision Making I am the first provider for this patient. Records Reviewed: I reviewed our electronic medical record system for any past medical records that were available that may contribute to the patient's current condition, including their PMH, surgical history, social and family history. Reviewed the nursing notes and vital signs from today's visit. Vital Signs-Reviewed the patient's vital signs.  
 
 
Provider Notes (Medical Decision Making):  
 Patient is hypoxemic, hypothermic and unresponsive on arrival. Glucose is reading \"high\". Not protecting airway and intubated immediately in the ED. Stabilized from respiratory perspective on the vent while full hyperglycemia/infectious workup was initiated. High likelihood of diabetic coma with or without DKA either due to noncompliance or due to infection. Other issues: 
- trop elevation - due to renal dysfunction or nstemi due to demand 
- renal fail - due to rhabdo (awaiting ck) or hypoperfusion or dm 
 
ED SEPSIS NOTE The patient now meets criteria for: Severe Sepsis 9:40am 
 
1. Fluid resuscitation with: 30 mL/kg crystalloid bolus 2. Initiation of targeted vs broad spectrum antibiotic therapy as soon as cultures have been sent. However, I will not withhold antibiotics if there is a delay in obtaining cultures. 3. Lactate level and plan to trend if elevated 12:22 PM 
 
I've completed my sepsis reassessment. Patient's hemodynamic status is within desirable limits. Abx given. Plan to admit to icu ED Course:  
Initial assessment performed. The patients presenting problems have been discussed, and they are in agreement with the care plan formulated and outlined with them. I have encouraged them to ask questions as they arise throughout their visit. Medications Administered During ED Course: 
Medications  
naloxone [facility]) injection 2 mg (2 mg IntraVENous Given 1/7/20 0913) lactated ringers bolus infusion 1,000 mL (0 mL IntraVENous Stopped 1/7/20 1020) Followed by  
lactated ringers bolus infusion 862 mL (0 mL IntraVENous Stopped 1/7/20 1020) DISPOSITION: ADMIT Patient is being admitted to the hospital.  Their test results and reasons for admission have been discussed. The patient and/or available family express agreement with and understanding of the need to be admitted and their admission diagnosis. Thank you for resuming the care of this patient. Please don't hesitate to contact me in the emergency department if you  have any additional questions. Valerio Dupont MD, MSc 
 
 
 
Diagnosis Clinical Impression: 1. Diabetic ketoacidosis with coma associated with other specified diabetes mellitus ([facility] [address] 2. NSTEMI (non-ST elevated myocardial infarction) ([facility] [address] 3. Respiratory failure, unspecified chronicity, unspecified whether with hypoxia or hypercapnia ([facility] [address] 4. Severe sepsis ([facility] [address] CRITICAL CARE NOTE : 
 
IMPENDING DETERIORATION -Airway, CNS and Metabolic ASSOCIATED RISK FACTORS - Shock, Hypoxia, Metabolic changes and CNS Decompensation MANAGEMENT- Bedside Assessment INTERPRETATION -  Xrays, CT Scan, Blood Gases, ECG and Blood Pressure INTERVENTIONS - hemodynamic mngmt, vent mngmt and Metobolic interventions CASE REVIEW - Hospitalist, Nursing and Family TREATMENT RESPONSE -Stable PERFORMED BY - Self NOTES   : 
 
I have spent 90 minutes of critical care time involved in lab review, consultations with specialist, family decision- making, bedside attention and documentation. During this entire length of time I was immediately available to the patient . Critical Care: The reason for providing this level of medical care for this critically ill patient was due to a critical illness that impaired one or more vital organ systems, such that there was a high probability of imminent or life threatening deterioration in the patient's condition. This care involved high complexity decision making to assess, manipulate, and support vital system functions, to treat this degree of vital organ system failure, and to prevent further life threatening deterioration of the patients condition. Valerio Dupont MD 
 
Procedure Note - Orotracheal Intubation:  
 
Performed by: dr Cheyenne Cruz Indication for procedure: respiratory failure RSI performed using 20mg etomidate and 100mg rocuronium. The patient was sedated with infusion of propofol and orotracheally intubated with a 7.5 cuffed Lebanese endotracheal tube using a 4mac blade with direct visualization. Tube was advanced to 23 cm at the lip. ETT location confirmed by bilateral, symmetric breath sounds, good end-tidal CO2 detector color change , no breath sounds over stomach, bulb aspirator expands promptly and chest x-ray visualization. Number of attempts: 1 Complications: none RSI was used. The procedure took 1-15 minutes, and pt tolerated well. Procedure Note - Central Line Placement:  
 
Performed by: dr Kaleigh Zapata Immediately prior to the procedure, the patient was reevaluated and found suitable for the planned procedure and any planned medications. Immediately prior to the procedure a time out was called to verify the correct patient, procedure, equipment, staff, and marking as appropriate. Area was cleansed with Betadine and anesthetized with 5mLs of 1% lidocaine. Prepped and draped in sterile fashion. Landmarks identified. 16 gauge needle with triple lumen catheter was inserted into pt's Right, Femoral Vein with ultrasound guidance. Line sutured in place; sterile dressing applied. Position: Trendelenburg Number of attempts: 1 Estimated blood loss: minimal 
The procedure took 16-30 minutes, and pt tolerated well. Attestation: 
I personally performed the services described in this documentation on this date 1/7/2020 for patient Jannette Torres. Esme Harry MD 
 
Please note that this dictation was completed with [employer], the computer voice recognition software. Quite often unanticipated grammatical, syntax, homophones, and other interpretive errors are inadvertently transcribed by the computer software. Please disregard these errors. Please excuse any errors that have escaped final proofreading.